# Patient Record
Sex: FEMALE | Race: BLACK OR AFRICAN AMERICAN | NOT HISPANIC OR LATINO | Employment: UNEMPLOYED
[De-identification: names, ages, dates, MRNs, and addresses within clinical notes are randomized per-mention and may not be internally consistent; named-entity substitution may affect disease eponyms.]

---

## 2017-12-17 ENCOUNTER — HEALTH MAINTENANCE LETTER (OUTPATIENT)
Age: 62
End: 2017-12-17

## 2024-07-16 ENCOUNTER — HOSPITAL ENCOUNTER (INPATIENT)
Facility: CLINIC | Age: 69
LOS: 8 days | Discharge: NURSING, REHAB OR LONG-TERM CARE | DRG: 948 | End: 2024-07-24
Attending: EMERGENCY MEDICINE | Admitting: INTERNAL MEDICINE
Payer: COMMERCIAL

## 2024-07-16 ENCOUNTER — APPOINTMENT (OUTPATIENT)
Dept: GENERAL RADIOLOGY | Facility: CLINIC | Age: 69
DRG: 948 | End: 2024-07-16
Attending: INTERNAL MEDICINE
Payer: COMMERCIAL

## 2024-07-16 ENCOUNTER — APPOINTMENT (OUTPATIENT)
Dept: CT IMAGING | Facility: CLINIC | Age: 69
DRG: 948 | End: 2024-07-16
Attending: INTERNAL MEDICINE
Payer: COMMERCIAL

## 2024-07-16 DIAGNOSIS — R13.12 DYSPHAGIA, OROPHARYNGEAL PHASE: ICD-10-CM

## 2024-07-16 DIAGNOSIS — I13.0 HYPERTENSIVE HEART AND RENAL DISEASE WITH CONGESTIVE HEART FAILURE (H): ICD-10-CM

## 2024-07-16 DIAGNOSIS — R35.0 URINARY FREQUENCY: ICD-10-CM

## 2024-07-16 DIAGNOSIS — Z78.9 UNABLE TO CARE FOR SELF: ICD-10-CM

## 2024-07-16 DIAGNOSIS — I69.322 CVA, OLD, DYSARTHRIA: ICD-10-CM

## 2024-07-16 DIAGNOSIS — E11.22 TYPE 2 DIABETES MELLITUS WITH DIABETIC CHRONIC KIDNEY DISEASE, UNSPECIFIED CKD STAGE, UNSPECIFIED WHETHER LONG TERM INSULIN USE (H): Primary | ICD-10-CM

## 2024-07-16 DIAGNOSIS — I69.359 HEMIPLEGIA, DOMINANT SIDE S/P CVA (CEREBROVASCULAR ACCIDENT) (H): ICD-10-CM

## 2024-07-16 DIAGNOSIS — N18.30 STAGE 3 CHRONIC KIDNEY DISEASE, UNSPECIFIED WHETHER STAGE 3A OR 3B CKD (H): ICD-10-CM

## 2024-07-16 DIAGNOSIS — I69.391 DYSPHAGIA STATUS POST CEREBROVASCULAR ACCIDENT: ICD-10-CM

## 2024-07-16 LAB
ALBUMIN SERPL BCG-MCNC: 3.5 G/DL (ref 3.5–5.2)
ALBUMIN UR-MCNC: 100 MG/DL
ALP SERPL-CCNC: 105 U/L (ref 40–150)
ALT SERPL W P-5'-P-CCNC: 14 U/L (ref 0–50)
ANION GAP SERPL CALCULATED.3IONS-SCNC: 10 MMOL/L (ref 7–15)
APPEARANCE UR: CLEAR
AST SERPL W P-5'-P-CCNC: 27 U/L (ref 0–45)
BASOPHILS # BLD AUTO: 0 10E3/UL (ref 0–0.2)
BASOPHILS NFR BLD AUTO: 0 %
BILIRUB SERPL-MCNC: 0.4 MG/DL
BILIRUB UR QL STRIP: NEGATIVE
BUN SERPL-MCNC: 28.9 MG/DL (ref 8–23)
CALCIUM SERPL-MCNC: 9.4 MG/DL (ref 8.8–10.4)
CHLORIDE SERPL-SCNC: 106 MMOL/L (ref 98–107)
COLOR UR AUTO: ABNORMAL
CREAT SERPL-MCNC: 1.5 MG/DL (ref 0.51–0.95)
CRP SERPL-MCNC: 30.5 MG/L
DIGOXIN SERPL-MCNC: 1 NG/ML (ref 0.6–2)
EGFRCR SERPLBLD CKD-EPI 2021: 38 ML/MIN/1.73M2
EOSINOPHIL # BLD AUTO: 0.1 10E3/UL (ref 0–0.7)
EOSINOPHIL NFR BLD AUTO: 1 %
ERYTHROCYTE [DISTWIDTH] IN BLOOD BY AUTOMATED COUNT: 16 % (ref 10–15)
GLUCOSE BLDC GLUCOMTR-MCNC: 94 MG/DL (ref 70–99)
GLUCOSE SERPL-MCNC: 114 MG/DL (ref 70–99)
GLUCOSE UR STRIP-MCNC: >=1000 MG/DL
HCO3 SERPL-SCNC: 25 MMOL/L (ref 22–29)
HCT VFR BLD AUTO: 36.5 % (ref 35–47)
HGB BLD-MCNC: 11 G/DL (ref 11.7–15.7)
HGB UR QL STRIP: NEGATIVE
IMM GRANULOCYTES # BLD: 0.1 10E3/UL
IMM GRANULOCYTES NFR BLD: 1 %
KETONES UR STRIP-MCNC: NEGATIVE MG/DL
LEUKOCYTE ESTERASE UR QL STRIP: ABNORMAL
LYMPHOCYTES # BLD AUTO: 2.1 10E3/UL (ref 0.8–5.3)
LYMPHOCYTES NFR BLD AUTO: 21 %
MAGNESIUM SERPL-MCNC: 2.3 MG/DL (ref 1.7–2.3)
MCH RBC QN AUTO: 26.6 PG (ref 26.5–33)
MCHC RBC AUTO-ENTMCNC: 30.1 G/DL (ref 31.5–36.5)
MCV RBC AUTO: 88 FL (ref 78–100)
MONOCYTES # BLD AUTO: 1 10E3/UL (ref 0–1.3)
MONOCYTES NFR BLD AUTO: 10 %
NEUTROPHILS # BLD AUTO: 6.7 10E3/UL (ref 1.6–8.3)
NEUTROPHILS NFR BLD AUTO: 67 %
NITRATE UR QL: NEGATIVE
NRBC # BLD AUTO: 0 10E3/UL
NRBC BLD AUTO-RTO: 0 /100
NT-PROBNP SERPL-MCNC: 1918 PG/ML (ref 0–900)
PH UR STRIP: 6.5 [PH] (ref 5–7)
PHOSPHATE SERPL-MCNC: 4.2 MG/DL (ref 2.5–4.5)
PLATELET # BLD AUTO: 414 10E3/UL (ref 150–450)
POTASSIUM SERPL-SCNC: 5.1 MMOL/L (ref 3.4–5.3)
PROCALCITONIN SERPL IA-MCNC: 0.18 NG/ML
PROT SERPL-MCNC: 8.3 G/DL (ref 6.4–8.3)
RBC # BLD AUTO: 4.14 10E6/UL (ref 3.8–5.2)
RBC URINE: 0 /HPF
SODIUM SERPL-SCNC: 141 MMOL/L (ref 135–145)
SP GR UR STRIP: 1.02 (ref 1–1.03)
SQUAMOUS EPITHELIAL: 3 /HPF
TRANSITIONAL EPI: <1 /HPF
UROBILINOGEN UR STRIP-MCNC: NORMAL MG/DL
WBC # BLD AUTO: 10 10E3/UL (ref 4–11)
WBC URINE: 4 /HPF

## 2024-07-16 PROCEDURE — 80162 ASSAY OF DIGOXIN TOTAL: CPT | Performed by: INTERNAL MEDICINE

## 2024-07-16 PROCEDURE — 83735 ASSAY OF MAGNESIUM: CPT | Performed by: INTERNAL MEDICINE

## 2024-07-16 PROCEDURE — 86140 C-REACTIVE PROTEIN: CPT | Performed by: INTERNAL MEDICINE

## 2024-07-16 PROCEDURE — 120N000002 HC R&B MED SURG/OB UMMC

## 2024-07-16 PROCEDURE — 84145 PROCALCITONIN (PCT): CPT | Performed by: INTERNAL MEDICINE

## 2024-07-16 PROCEDURE — 84100 ASSAY OF PHOSPHORUS: CPT | Performed by: INTERNAL MEDICINE

## 2024-07-16 PROCEDURE — 82962 GLUCOSE BLOOD TEST: CPT

## 2024-07-16 PROCEDURE — 82040 ASSAY OF SERUM ALBUMIN: CPT | Performed by: EMERGENCY MEDICINE

## 2024-07-16 PROCEDURE — 83036 HEMOGLOBIN GLYCOSYLATED A1C: CPT | Performed by: INTERNAL MEDICINE

## 2024-07-16 PROCEDURE — 71045 X-RAY EXAM CHEST 1 VIEW: CPT

## 2024-07-16 PROCEDURE — 84155 ASSAY OF PROTEIN SERUM: CPT | Performed by: EMERGENCY MEDICINE

## 2024-07-16 PROCEDURE — 87086 URINE CULTURE/COLONY COUNT: CPT | Performed by: INTERNAL MEDICINE

## 2024-07-16 PROCEDURE — 70450 CT HEAD/BRAIN W/O DYE: CPT

## 2024-07-16 PROCEDURE — 99222 1ST HOSP IP/OBS MODERATE 55: CPT | Performed by: INTERNAL MEDICINE

## 2024-07-16 PROCEDURE — 36415 COLL VENOUS BLD VENIPUNCTURE: CPT | Performed by: EMERGENCY MEDICINE

## 2024-07-16 PROCEDURE — 99285 EMERGENCY DEPT VISIT HI MDM: CPT | Mod: 25 | Performed by: EMERGENCY MEDICINE

## 2024-07-16 PROCEDURE — 70450 CT HEAD/BRAIN W/O DYE: CPT | Mod: 26 | Performed by: RADIOLOGY

## 2024-07-16 PROCEDURE — 258N000003 HC RX IP 258 OP 636: Performed by: INTERNAL MEDICINE

## 2024-07-16 PROCEDURE — 71045 X-RAY EXAM CHEST 1 VIEW: CPT | Mod: 26 | Performed by: STUDENT IN AN ORGANIZED HEALTH CARE EDUCATION/TRAINING PROGRAM

## 2024-07-16 PROCEDURE — 81001 URINALYSIS AUTO W/SCOPE: CPT | Performed by: EMERGENCY MEDICINE

## 2024-07-16 PROCEDURE — 99285 EMERGENCY DEPT VISIT HI MDM: CPT | Performed by: EMERGENCY MEDICINE

## 2024-07-16 PROCEDURE — 85025 COMPLETE CBC W/AUTO DIFF WBC: CPT | Performed by: EMERGENCY MEDICINE

## 2024-07-16 PROCEDURE — 83880 ASSAY OF NATRIURETIC PEPTIDE: CPT | Performed by: INTERNAL MEDICINE

## 2024-07-16 RX ORDER — ACETAMINOPHEN 325 MG/1
650 TABLET ORAL EVERY 4 HOURS PRN
Status: DISCONTINUED | OUTPATIENT
Start: 2024-07-16 | End: 2024-07-24 | Stop reason: HOSPADM

## 2024-07-16 RX ORDER — ACETAMINOPHEN 650 MG/1
650 SUPPOSITORY RECTAL EVERY 4 HOURS PRN
Status: DISCONTINUED | OUTPATIENT
Start: 2024-07-16 | End: 2024-07-24 | Stop reason: HOSPADM

## 2024-07-16 RX ORDER — LIDOCAINE 40 MG/G
CREAM TOPICAL
Status: DISCONTINUED | OUTPATIENT
Start: 2024-07-16 | End: 2024-07-24 | Stop reason: HOSPADM

## 2024-07-16 RX ORDER — PROCHLORPERAZINE MALEATE 5 MG
5 TABLET ORAL EVERY 6 HOURS PRN
Status: DISCONTINUED | OUTPATIENT
Start: 2024-07-16 | End: 2024-07-24 | Stop reason: HOSPADM

## 2024-07-16 RX ORDER — AMOXICILLIN 250 MG
2 CAPSULE ORAL 2 TIMES DAILY PRN
Status: DISCONTINUED | OUTPATIENT
Start: 2024-07-16 | End: 2024-07-24 | Stop reason: HOSPADM

## 2024-07-16 RX ORDER — NICOTINE POLACRILEX 4 MG
15-30 LOZENGE BUCCAL
Status: DISCONTINUED | OUTPATIENT
Start: 2024-07-16 | End: 2024-07-24 | Stop reason: HOSPADM

## 2024-07-16 RX ORDER — AMOXICILLIN 250 MG
1 CAPSULE ORAL 2 TIMES DAILY PRN
Status: DISCONTINUED | OUTPATIENT
Start: 2024-07-16 | End: 2024-07-24 | Stop reason: HOSPADM

## 2024-07-16 RX ORDER — IPRATROPIUM BROMIDE AND ALBUTEROL SULFATE 2.5; .5 MG/3ML; MG/3ML
3 SOLUTION RESPIRATORY (INHALATION) EVERY 4 HOURS PRN
Status: DISCONTINUED | OUTPATIENT
Start: 2024-07-16 | End: 2024-07-24 | Stop reason: HOSPADM

## 2024-07-16 RX ORDER — CALCIUM CARBONATE 500 MG/1
1000 TABLET, CHEWABLE ORAL 4 TIMES DAILY PRN
Status: DISCONTINUED | OUTPATIENT
Start: 2024-07-16 | End: 2024-07-24 | Stop reason: HOSPADM

## 2024-07-16 RX ORDER — DEXTROSE MONOHYDRATE 25 G/50ML
25-50 INJECTION, SOLUTION INTRAVENOUS
Status: DISCONTINUED | OUTPATIENT
Start: 2024-07-16 | End: 2024-07-24 | Stop reason: HOSPADM

## 2024-07-16 RX ORDER — PROCHLORPERAZINE 25 MG
12.5 SUPPOSITORY, RECTAL RECTAL EVERY 12 HOURS PRN
Status: DISCONTINUED | OUTPATIENT
Start: 2024-07-16 | End: 2024-07-24 | Stop reason: HOSPADM

## 2024-07-16 RX ADMIN — SODIUM CHLORIDE, POTASSIUM CHLORIDE, SODIUM LACTATE AND CALCIUM CHLORIDE 500 ML: 600; 310; 30; 20 INJECTION, SOLUTION INTRAVENOUS at 23:58

## 2024-07-16 ASSESSMENT — ACTIVITIES OF DAILY LIVING (ADL)
ADLS_ACUITY_SCORE: 43
ADLS_ACUITY_SCORE: 35
ADLS_ACUITY_SCORE: 35
ADLS_ACUITY_SCORE: 43

## 2024-07-16 NOTE — PROGRESS NOTES
Pt has been incontinent x2. Provider aware. Orders to straughgt cath to obtain   UA. Pt in aggreance

## 2024-07-16 NOTE — LETTER
Recipient: [facility]          Sender: David Ng [phone]          Date: July 19, 2024  Patient Name:  Roya Burgos  Patient YOB: 1955  Routing Message:  Patient is needing LTC placement. She is med ready. Please call with any questions and if able to accept the patient. Thank you! David Ng [phone]        The documents accompanying this notice contain confidential information belonging to the sender.  This information is intended only for the use of the individual or entity named above.  The authorized recipient of this information is prohibited from disclosing this information to any other party and is required to destroy the information after its stated need has been fulfilled, unless otherwise required by state law.    If you are not the intended recipient, you are hereby notified that any disclosure, copy, distribution or action taken in reliance on the contents of these documents is strictly prohibited.  If you have received this document in error, please return it by fax to [phone] with a note on the cover sheet explaining why you are returning it (e.g. not your patient, not your provider, etc.).  If you need further assistance, please call .  Documents may also be returned by mail to [facility] [facility], , [address], [place], [place] [address].

## 2024-07-16 NOTE — LETTER
Recipient: [facility]          Sender: David Ng [phone]          Date: July 23, 2024  Patient Name:  Roya Burgos  Patient YOB: 1955  Routing Message:  Patient is needing LTC placement. She is med ready. Please call with any questions and if able to accept the patient. Thank you!!        The documents accompanying this notice contain confidential information belonging to the sender.  This information is intended only for the use of the individual or entity named above.  The authorized recipient of this information is prohibited from disclosing this information to any other party and is required to destroy the information after its stated need has been fulfilled, unless otherwise required by state law.    If you are not the intended recipient, you are hereby notified that any disclosure, copy, distribution or action taken in reliance on the contents of these documents is strictly prohibited.  If you have received this document in error, please return it by fax to [phone] with a note on the cover sheet explaining why you are returning it (e.g. not your patient, not your provider, etc.).  If you need further assistance, please call .  Documents may also be returned by mail to [facility] [employer] [facility], , [address], [place], [place] [address].

## 2024-07-16 NOTE — ED NOTES
Bed: UUEDH-E  Expected date:   Expected time:   Means of arrival:   Comments:  [facility] [phone] [id number] bed bound, ETA 15, needs higher level of care poss UTI

## 2024-07-16 NOTE — ED PROVIDER NOTES
"  History     Chief Complaint   Patient presents with    Urinary Frequency     The history is provided by the patient and medical records.     Roya Burgos is a 69 year old female with a past medical history of type 2 diabetes, hypertension, recent ischemic left MCA CVA complicated by cerebral edema status post decompressive hemicraniectomy complicated by hemorrhagic transformation secondary to therapeutic anticoagulation with residual dysarthria, oropharyngeal dysphagia s/p PEG tube on TF's and hemiparesis (5/2024), CKD stage 3, cardiomyopathy, recently diagnosed heart failure, atrial fibrillation with RVR (on diltiazem, lopressor, digoxin), and COPD who presents to the ED via ambulance from home for evaluation of subjective fevers, urinary frequency and concerns for UTI. She had a recent stroke in March 2024 with right side deficit. Patient have been in and out of the hospital due to stroke and was finally discharged home two days ago. Family noticed that patient has been warm to the touch, urinary frequency which patient and family are suspecting a UTI. Patient is bed bound. Patient, home health staff and family feel that patient needs to be moved to a facility with high level of care.      Epic records reviewed.     Patient was discharged to [facility] 6/24/24 after her hospitalization after initial left MCA CVA.     Hospitalized from  6/30/2024 to 7/1/2024 for evaluation after a headache.   ED note 6/30/2024:  \"I had a long discussion with the patient's daughter, who took the patient home from the hospital 2 days ago and is having a hard time caring for her at home. Staff in the hospital had recommended TCU, but they were unable to find her a bed other than at a place where patient has been previously and daughter was unhappy with the care she received there. Daughter thought she would be able to take care of her at home, so has been caring for her. The cares the patient is requiring are far more than she can " "handle and she is unable to get the patient into the tub to bathe her and does not have the resources needed to care for her 24 hours today. Daughter would like patient to be readmitted for placement to a TCU\"     Discharge summary: \"Daughter Miles asked if pt can go to TCU/ARU as she wanted her to get aggressive therapy I hope pt's right sided deficit would improve. She didn't think aggressive therapy can be done at home... PT/OT worked with patient and they don't think she qualifies for ARU/TCU and is more appropriate for LTC. After this discussion, pt's daughter elected to take her home and try again with home care.\"     Hospitalized from 7/6/2024 to 7/14/2024 with Headache, need for long term care placement     Hospitalist note 7/6/24  \"Disposition Planning: has been living with patients daughter. Daughter reports it has been challenging to care for her. She spoke with a long term care facility on Friday whom reportedly may have a bed available on Monday. Patient's daughter reports it is unsafe for her to discharge to patient's own house with patients grandson/daughters son caring for her until then.\"    Discharge summary 7/14/2024: \"FTT at home  Initial reason for admission. Family not able able to care for patient at home. However, after further consideration, daughter wanted to get patient set up with more resources at home and give it one more try. Able to set up with supplies, hospital bed, and home cares\"     I have reviewed the Medications, Allergies, Past Medical and Surgical History, and Social History in the [employer] system.    Past Medical History:   Diagnosis Date    Diabetes mellitus, type II, insulin dependent (H)     HTN (hypertension)     Major depression      Past Surgical History:   Procedure Laterality Date    ANKLE SURGERY      IR LOWER EXTREMITY ANGIOGRAM LEFT  4/6/2016    IR LOWER EXTREMITY ANGIOGRAM LEFT  2/3/2016     No current facility-administered medications for this encounter. "     Current Outpatient Medications   Medication Sig Dispense Refill    ASPIRIN PO Take 81 mg by mouth daily      Dextromethorphan-guaiFENesin  MG/5ML LIQD Take 15 mLs by mouth 4 times daily as needed 180 mL 1    DM-Phenylephrine-acetaminophen 10-5-325 MG CAPS       insulin aspart (NOVOLOG FLEXPEN) 100 UNIT/ML soln Inject 10 Units Subcutaneous 3 times daily (with meals)      insulin glargine (LANTUS VIAL) 100 UNIT/ML soln Inject 20 Units Subcutaneous At Bedtime      lisinopril-hydrochlorothiazide (PRINZIDE,ZESTORETIC) 20-12.5 MG per tablet Take 1 tablet by mouth every morning      METFORMIN HCL PO Take 1,000 mg by mouth 2 times daily      oxyCODONE-acetaminophen (PERCOCET) 5-325 MG per tablet Take 1 tablet by mouth 2 times daily as needed       No Known Allergies  Past medical history, past surgical history, medications, and allergies were reviewed with the patient. Additional pertinent items: None    Social History     Socioeconomic History    Marital status: Single     Spouse name: Not on file    Number of children: Not on file    Years of education: Not on file    Highest education level: Not on file   Occupational History    Not on file   Tobacco Use    Smoking status: Not on file    Smokeless tobacco: Not on file   Substance and Sexual Activity    Alcohol use: No    Drug use: No    Sexual activity: Not on file   Other Topics Concern    Not on file   Social History Narrative    On disability, due to depression. Former nursing assistant in hospitals and NH. Has two daughters, one lives in [place] and one lives in [place].     Social Determinants of Health     Financial Resource Strain: Low Risk  (1/25/2024)    Received from [facility]    Financial Resource Strain     Difficulty of Paying Living Expenses: 3     Difficulty of Paying Living Expenses: Not on file   Food Insecurity: No Food Insecurity (7/6/2024)    Received from [facility]    Hunger Vital Sign      "Worried About Running Out of Food in the Last Year: Never true     Ran Out of Food in the Last Year: Never true   Transportation Needs: No Transportation Needs (7/6/2024)    Received from [facility]    PRAPARE - Transportation     Lack of Transportation (Medical): No     Lack of Transportation (Non-Medical): No   Physical Activity: Not on File (6/27/2022)    Received from [facility]    Physical Activity     Physical Activity: 0   Stress: Not on File (6/27/2022)    Received from [employer]    Stress     Stress: 0   Social Connections: Socially Integrated (1/25/2024)    Received from [facility]    Social Connections     Frequency of Communication with Friends and Family: 0   Interpersonal Safety: Unknown (7/6/2024)    Received from [facility]    Humiliation, Afraid, Rape, and Kick questionnaire     Fear of Current or Ex-Partner: Not on file     Emotionally Abused: No     Physically Abused: No     Sexually Abused: No   Housing Stability: Low Risk  (7/6/2024)    Received from [facility]    Housing Stability Vital Sign     Unable to Pay for Housing in the Last Year: No     Number of Places Lived in the Last Year: 1     In the last 12 months, was there a time when you did not have a steady place to sleep or slept in a shelter (including now)?: No     Social history was reviewed with the patient. Additional pertinent items: None    Review of Systems  A medically appropriate review of systems was performed with pertinent positives and negatives noted in the HPI, and all other systems negative.    Physical Exam   BP: 137/87  Pulse: 93  Temp: 98.9  F (37.2  C)  Resp: 13  Height: 165.1 cm (5' 5\")  Weight: 107.5 kg (237 lb)  SpO2: 97 %      General: Well nourished, well developed, NAD  HEENT: EOMI, anicteric. NCAT, MMM  Neck: no jugular venous distension, supple, nl ROM  Cardiac: Regular rate and rhythm. No murmurs, rubs, or gallops. Normal S1, S2.  Intact peripheral pulses  Pulm: CTAB, no " stridor, wheezes, rales, rhonchi  Abd: Soft, nontender, nondistended.  No masses palpated.    Skin: Warm and dry to the touch.  No rash  Extremities: No LE edema, no cyanosis, w/w/p  Neuro: Alert, no new gross focal deficits    ED Course        Procedures                        Labs Ordered and Resulted from Time of ED Arrival to Time of ED Departure   COMPREHENSIVE METABOLIC PANEL - Abnormal       Result Value    Sodium 141      Potassium 5.1      Carbon Dioxide (CO2) 25      Anion Gap 10      Urea Nitrogen 28.9 (*)     Creatinine 1.50 (*)     GFR Estimate 38 (*)     Calcium 9.4      Chloride 106      Glucose 114 (*)     Alkaline Phosphatase 105      AST 27      ALT 14      Protein Total 8.3      Albumin 3.5      Bilirubin Total 0.4     ROUTINE UA WITH MICROSCOPIC REFLEX TO CULTURE - Abnormal    Color Urine Light Yellow      Appearance Urine Clear      Glucose Urine >=1000 (*)     Bilirubin Urine Negative      Ketones Urine Negative      Specific Gravity Urine 1.022      Blood Urine Negative      pH Urine 6.5      Protein Albumin Urine 100 (*)     Urobilinogen Urine Normal      Nitrite Urine Negative      Leukocyte Esterase Urine Trace (*)     RBC Urine 0      WBC Urine 4      Squamous Epithelials Urine 3 (*)     Transitional Epithelials Urine <1     CBC WITH PLATELETS AND DIFFERENTIAL - Abnormal    WBC Count 10.0      RBC Count 4.14      Hemoglobin 11.0 (*)     Hematocrit 36.5      MCV 88      MCH 26.6      MCHC 30.1 (*)     RDW 16.0 (*)     Platelet Count 414      % Neutrophils 67      % Lymphocytes 21      % Monocytes 10      % Eosinophils 1      % Basophils 0      % Immature Granulocytes 1      NRBCs per 100 WBC 0      Absolute Neutrophils 6.7      Absolute Lymphocytes 2.1      Absolute Monocytes 1.0      Absolute Eosinophils 0.1      Absolute Basophils 0.0      Absolute Immature Granulocytes 0.1      Absolute NRBCs 0.0     CRP INFLAMMATION - Abnormal    CRP Inflammation 30.50 (*)    NT PROBNP INPATIENT -  Abnormal    N terminal Pro BNP Inpatient 1,918 (*)    HEMOGLOBIN A1C - Abnormal    Hemoglobin A1C 7.4 (*)    GLUCOSE BY METER - Abnormal    GLUCOSE BY METER POCT 131 (*)    GLUCOSE BY METER - Abnormal    GLUCOSE BY METER POCT 122 (*)    PROCALCITONIN - Normal    Procalcitonin 0.18     MAGNESIUM - Normal    Magnesium 2.3     PHOSPHORUS - Normal    Phosphorus 4.2     DIGOXIN LEVEL - Normal    Digoxin 1.0     GLUCOSE BY METER - Normal    GLUCOSE BY METER POCT 94     AMMONIA   LACTIC ACID WHOLE BLOOD   CRP INFLAMMATION   PROCALCITONIN   BLOOD GAS VENOUS   COVID-19 VIRUS (CORONAVIRUS) BY PCR   GLUCOSE MONITOR NURSING POCT   GLUCOSE MONITOR NURSING POCT   GLUCOSE MONITOR NURSING POCT   GLUCOSE MONITOR NURSING POCT   GLUCOSE MONITOR NURSING POCT   COMPREHENSIVE METABOLIC PANEL   GLUCOSE MONITOR NURSING POCT   URINE CULTURE    Culture No growth, less than 1 day     C. DIFFICILE TOXIN B PCR WITH REFLEX TO C. DIFFICILE ANTIGEN AND TOXINS A/B EIA   ENTERIC BACTERIA AND VIRUS PANEL BY PCR   BLOOD CULTURE   BLOOD CULTURE   FRACTIONAL EXCRETION OF SODIUM            Results for orders placed or performed during the hospital encounter of 07/16/24 (from the past 24 hour(s))   CBC with platelets differential    Narrative    The following orders were created for panel order CBC with platelets differential.  Procedure                               Abnormality         Status                     ---------                               -----------         ------                     CBC with platelets and d...[[id number]]  Abnormal            Final result                 Please view results for these tests on the individual orders.   Comprehensive metabolic panel   Result Value Ref Range    Sodium 141 135 - 145 mmol/L    Potassium 5.1 3.4 - 5.3 mmol/L    Carbon Dioxide (CO2) 25 22 - 29 mmol/L    Anion Gap 10 7 - 15 mmol/L    Urea Nitrogen 28.9 (H) 8.0 - 23.0 mg/dL    Creatinine 1.50 (H) 0.51 - 0.95 mg/dL    GFR Estimate 38 (L) >60  mL/min/1.73m2    Calcium 9.4 8.8 - 10.4 mg/dL    Chloride 106 98 - 107 mmol/L    Glucose 114 (H) 70 - 99 mg/dL    Alkaline Phosphatase 105 40 - 150 U/L    AST 27 0 - 45 U/L    ALT 14 0 - 50 U/L    Protein Total 8.3 6.4 - 8.3 g/dL    Albumin 3.5 3.5 - 5.2 g/dL    Bilirubin Total 0.4 <=1.2 mg/dL   CBC with platelets and differential   Result Value Ref Range    WBC Count 10.0 4.0 - 11.0 10e3/uL    RBC Count 4.14 3.80 - 5.20 10e6/uL    Hemoglobin 11.0 (L) 11.7 - 15.7 g/dL    Hematocrit 36.5 35.0 - 47.0 %    MCV 88 78 - 100 fL    MCH 26.6 26.5 - 33.0 pg    MCHC 30.1 (L) 31.5 - 36.5 g/dL    RDW 16.0 (H) 10.0 - 15.0 %    Platelet Count 414 150 - 450 10e3/uL    % Neutrophils 67 %    % Lymphocytes 21 %    % Monocytes 10 %    % Eosinophils 1 %    % Basophils 0 %    % Immature Granulocytes 1 %    NRBCs per 100 WBC 0 <1 /100    Absolute Neutrophils 6.7 1.6 - 8.3 10e3/uL    Absolute Lymphocytes 2.1 0.8 - 5.3 10e3/uL    Absolute Monocytes 1.0 0.0 - 1.3 10e3/uL    Absolute Eosinophils 0.1 0.0 - 0.7 10e3/uL    Absolute Basophils 0.0 0.0 - 0.2 10e3/uL    Absolute Immature Granulocytes 0.1 <=0.4 10e3/uL    Absolute NRBCs 0.0 10e3/uL   CRP inflammation   Result Value Ref Range    CRP Inflammation 30.50 (H) <5.00 mg/L   Procalcitonin   Result Value Ref Range    Procalcitonin 0.18 <0.50 ng/mL   Magnesium   Result Value Ref Range    Magnesium 2.3 1.7 - 2.3 mg/dL   Phosphorus   Result Value Ref Range    Phosphorus 4.2 2.5 - 4.5 mg/dL   Nt probnp inpatient   Result Value Ref Range    N terminal Pro BNP Inpatient 1,918 (H) 0 - 900 pg/mL   Digoxin level   Result Value Ref Range    Digoxin 1.0 0.6 - 2.0 ng/mL   Hemoglobin A1c   Result Value Ref Range    Hemoglobin A1C 7.4 (H) <5.7 %   UA with Microscopic reflex to Culture    Specimen: Urine, Catheter   Result Value Ref Range    Color Urine Light Yellow Colorless, Straw, Light Yellow, Yellow    Appearance Urine Clear Clear    Glucose Urine >=1000 (A) Negative mg/dL    Bilirubin Urine Negative  Negative    Ketones Urine Negative Negative mg/dL    Specific Gravity Urine 1.022 1.003 - 1.035    Blood Urine Negative Negative    pH Urine 6.5 5.0 - 7.0    Protein Albumin Urine 100 (A) Negative mg/dL    Urobilinogen Urine Normal Normal, 2.0 mg/dL    Nitrite Urine Negative Negative    Leukocyte Esterase Urine Trace (A) Negative    RBC Urine 0 <=2 /HPF    WBC Urine 4 <=5 /HPF    Squamous Epithelials Urine 3 (H) <=1 /HPF    Transitional Epithelials Urine <1 <=1 /HPF    Narrative    Urine Culture not indicated   Urine Culture    Specimen: Urine, Catheter   Result Value Ref Range    Culture No growth, less than 1 day    XR Chest Port 1 View    Narrative    EXAM: XR CHEST PORT 1 VIEW  LOCATION: [facility]  DATE: 7/16/2024    INDICATION: subjective fever, cough, post stroke, high aspiration risk  COMPARISON: Chest radiograph 6/8/2024      Impression    IMPRESSION: Low lung volumes with bronchovascular crowding. No focal consolidation, pleural effusion or pneumothorax. Cardiomediastinal silhouette is unremarkable.   CT Head w/o Contrast    Narrative    EXAM: CT HEAD W/O CONTRAST  LOCATION: [facility]  DATE: 7/16/2024    INDICATION: Change of mental status, recent left MCA s/p carniotomy with bleeding.  COMPARISON: 7/13/2024.  TECHNIQUE: Routine CT Head without IV contrast. Multiplanar reformats. Dose reduction techniques were used.    FINDINGS:  INTRACRANIAL CONTENTS: Ischemic changes in the left cerebral hemisphere with overlying craniectomy. Small chronic lacunar infarct in the left cerebellar hemisphere again noted. No CT evidence of acute infarct. Normal parenchymal attenuation. Normal   ventricles and sulci.     VISUALIZED ORBITS/SINUSES/MASTOIDS: No intraorbital abnormality. No paranasal sinus mucosal disease. No middle ear or mastoid effusion.    BONES/SOFT TISSUES: No acute abnormality. Indeterminate left parotid nodule  again noted.      Impression    IMPRESSION:  1.  No acute intracranial abnormality or significant change compared to the prior study.   Glucose by meter   Result Value Ref Range    GLUCOSE BY METER POCT 94 70 - 99 mg/dL   Glucose by meter   Result Value Ref Range    GLUCOSE BY METER POCT 131 (H) 70 - 99 mg/dL   Glucose by meter   Result Value Ref Range    GLUCOSE BY METER POCT 122 (H) 70 - 99 mg/dL       Labs, vital signs, and imaging studies were reviewed by me.    Medications - No data to display    Assessments & Plan (with Medical Decision Making)   Roya Burgos is a 69 year old female who presents with weakness, unable to care for herself at home, and urinary symptoms. Ddx includes UTI, electrolyte or other metabolic abnormality, sequelae from recent known CVA. Labs ordered to further evaluate the pt.     Labs remarkable for normal WBC count. UA not c/w UTI.     Critical care was not performed.     Medical Decision Making  The patient's presentation was of high complexity (a chronic illness severe exacerbation, progression, or side effect of treatment).    The patient's evaluation involved:  ordering and/or review of 3+ test(s) in this encounter (see separate area of note for details)  discussion of management or test interpretation with another health professional (see separate area of note for details)    The patient's management necessitated high risk (a decision regarding hospitalization).    I have reviewed the nursing notes.    I have reviewed the findings, diagnosis, plan and need for follow up with the patient.    Patient and their further management were discussed with IM, to be admitted to their service. Plan was discussed with patient who understands and agrees with plan.       New Prescriptions    No medications on file       Final diagnoses:   Urinary frequency   Unable to care for self       LOPEZ LOMBARDI MD  7/16/2024   [facility] EMERGENCY DEPARTMENT             Lopez Lombardi  MD YUE  07/17/24 [id number]

## 2024-07-16 NOTE — ED TRIAGE NOTES
Pt BIBA from home.Pt had a recent stroke in march 2024 with right side deficit.Pt have been in and out of the hospital due to stroke and was finally discharged home two days ago.Pt, home health staff and family agrees that pt needs to be in  a facility with high level of care.Pt also is  warm to touch, urinary frequency which pt and family are suspecting a UTI.Pt is bed bound.BG-130, Vitals WNL, slightly tachy-110  HX: Stroke, craniotomy

## 2024-07-17 ENCOUNTER — APPOINTMENT (OUTPATIENT)
Dept: SPEECH THERAPY | Facility: CLINIC | Age: 69
DRG: 948 | End: 2024-07-17
Attending: INTERNAL MEDICINE
Payer: COMMERCIAL

## 2024-07-17 LAB
ADV 40+41 DNA STL QL NAA+NON-PROBE: NEGATIVE
ASTRO TYP 1-8 RNA STL QL NAA+NON-PROBE: NEGATIVE
ATRIAL RATE - MUSE: 132 BPM
BASE EXCESS BLDV CALC-SCNC: 1.5 MMOL/L (ref -3–3)
C CAYETANENSIS DNA STL QL NAA+NON-PROBE: NEGATIVE
C DIFF TOX B STL QL: NEGATIVE
CAMPYLOBACTER DNA SPEC NAA+PROBE: NEGATIVE
CRYPTOSP DNA STL QL NAA+NON-PROBE: NEGATIVE
DIASTOLIC BLOOD PRESSURE - MUSE: NORMAL MMHG
E COLI O157 DNA STL QL NAA+NON-PROBE: NORMAL
E HISTOLYT DNA STL QL NAA+NON-PROBE: NEGATIVE
EAEC ASTA GENE ISLT QL NAA+PROBE: NEGATIVE
EC STX1+STX2 GENES STL QL NAA+NON-PROBE: NEGATIVE
EPEC EAE GENE STL QL NAA+NON-PROBE: NEGATIVE
ETEC LTA+ST1A+ST1B TOX ST NAA+NON-PROBE: NEGATIVE
G LAMBLIA DNA STL QL NAA+NON-PROBE: NEGATIVE
GLUCOSE BLDC GLUCOMTR-MCNC: 122 MG/DL (ref 70–99)
GLUCOSE BLDC GLUCOMTR-MCNC: 131 MG/DL (ref 70–99)
GLUCOSE BLDC GLUCOMTR-MCNC: 180 MG/DL (ref 70–99)
GLUCOSE BLDC GLUCOMTR-MCNC: 241 MG/DL (ref 70–99)
HBA1C MFR BLD: 7.4 %
HCO3 BLDV-SCNC: 26 MMOL/L (ref 21–28)
INTERPRETATION ECG - MUSE: NORMAL
LACTATE SERPL-SCNC: 2.1 MMOL/L (ref 0.7–2)
NOROVIRUS GI+II RNA STL QL NAA+NON-PROBE: NEGATIVE
O2/TOTAL GAS SETTING VFR VENT: 92 %
OXYHGB MFR BLDV: 73 % (ref 70–75)
P AXIS - MUSE: NORMAL DEGREES
P SHIGELLOIDES DNA STL QL NAA+NON-PROBE: NEGATIVE
PCO2 BLDV: 40 MM HG (ref 40–50)
PH BLDV: 7.42 [PH] (ref 7.32–7.43)
PO2 BLDV: 40 MM HG (ref 25–47)
PR INTERVAL - MUSE: NORMAL MS
QRS DURATION - MUSE: 120 MS
QT - MUSE: 340 MS
QTC - MUSE: 503 MS
R AXIS - MUSE: -78 DEGREES
RVA RNA STL QL NAA+NON-PROBE: NEGATIVE
SALMONELLA SP RPOD STL QL NAA+PROBE: NEGATIVE
SAO2 % BLDV: 74.1 % (ref 70–75)
SAPO I+II+IV+V RNA STL QL NAA+NON-PROBE: NEGATIVE
SARS-COV-2 RNA RESP QL NAA+PROBE: NEGATIVE
SHIGELLA SP+EIEC IPAH ST NAA+NON-PROBE: NEGATIVE
SYSTOLIC BLOOD PRESSURE - MUSE: NORMAL MMHG
T AXIS - MUSE: 64 DEGREES
V CHOLERAE DNA SPEC QL NAA+PROBE: NEGATIVE
VENTRICULAR RATE- MUSE: 132 BPM
VIBRIO DNA SPEC NAA+PROBE: NEGATIVE
Y ENTEROCOL DNA STL QL NAA+PROBE: NEGATIVE

## 2024-07-17 PROCEDURE — 82962 GLUCOSE BLOOD TEST: CPT

## 2024-07-17 PROCEDURE — 250N000011 HC RX IP 250 OP 636: Performed by: INTERNAL MEDICINE

## 2024-07-17 PROCEDURE — 83605 ASSAY OF LACTIC ACID: CPT | Performed by: INTERNAL MEDICINE

## 2024-07-17 PROCEDURE — 87635 SARS-COV-2 COVID-19 AMP PRB: CPT | Performed by: INTERNAL MEDICINE

## 2024-07-17 PROCEDURE — 87493 C DIFF AMPLIFIED PROBE: CPT | Performed by: INTERNAL MEDICINE

## 2024-07-17 PROCEDURE — 36415 COLL VENOUS BLD VENIPUNCTURE: CPT | Performed by: INTERNAL MEDICINE

## 2024-07-17 PROCEDURE — 82805 BLOOD GASES W/O2 SATURATION: CPT | Performed by: INTERNAL MEDICINE

## 2024-07-17 PROCEDURE — 99232 SBSQ HOSP IP/OBS MODERATE 35: CPT | Performed by: STUDENT IN AN ORGANIZED HEALTH CARE EDUCATION/TRAINING PROGRAM

## 2024-07-17 PROCEDURE — 250N000013 HC RX MED GY IP 250 OP 250 PS 637: Performed by: STUDENT IN AN ORGANIZED HEALTH CARE EDUCATION/TRAINING PROGRAM

## 2024-07-17 PROCEDURE — 999N000157 HC STATISTIC RCP TIME EA 10 MIN

## 2024-07-17 PROCEDURE — 87507 IADNA-DNA/RNA PROBE TQ 12-25: CPT | Performed by: INTERNAL MEDICINE

## 2024-07-17 PROCEDURE — 250N000013 HC RX MED GY IP 250 OP 250 PS 637: Performed by: INTERNAL MEDICINE

## 2024-07-17 PROCEDURE — 250N000012 HC RX MED GY IP 250 OP 636 PS 637: Performed by: STUDENT IN AN ORGANIZED HEALTH CARE EDUCATION/TRAINING PROGRAM

## 2024-07-17 PROCEDURE — 92526 ORAL FUNCTION THERAPY: CPT | Mod: GN

## 2024-07-17 PROCEDURE — 92610 EVALUATE SWALLOWING FUNCTION: CPT | Mod: GN

## 2024-07-17 PROCEDURE — 120N000002 HC R&B MED SURG/OB UMMC

## 2024-07-17 PROCEDURE — 99207 PR APP CREDIT; MD BILLING SHARED VISIT: CPT | Performed by: PHYSICIAN ASSISTANT

## 2024-07-17 RX ORDER — FLUTICASONE FUROATE AND VILANTEROL 100; 25 UG/1; UG/1
1 POWDER RESPIRATORY (INHALATION) DAILY
Status: DISCONTINUED | OUTPATIENT
Start: 2024-07-18 | End: 2024-07-24 | Stop reason: HOSPADM

## 2024-07-17 RX ORDER — ATORVASTATIN CALCIUM 40 MG/1
40 TABLET, FILM COATED ORAL EVERY EVENING
Status: DISCONTINUED | OUTPATIENT
Start: 2024-07-17 | End: 2024-07-24 | Stop reason: HOSPADM

## 2024-07-17 RX ORDER — DIGOXIN 125 MCG
125 TABLET ORAL DAILY
Status: ON HOLD | COMMUNITY
End: 2024-07-24

## 2024-07-17 RX ORDER — GABAPENTIN 250 MG/5ML
6 SOLUTION ORAL EVERY 8 HOURS
Status: ON HOLD | COMMUNITY
End: 2024-07-24

## 2024-07-17 RX ORDER — ALBUTEROL SULFATE 90 UG/1
2 AEROSOL, METERED RESPIRATORY (INHALATION) EVERY 4 HOURS PRN
Status: DISCONTINUED | OUTPATIENT
Start: 2024-07-17 | End: 2024-07-24 | Stop reason: HOSPADM

## 2024-07-17 RX ORDER — LOSARTAN POTASSIUM 50 MG/1
50 TABLET ORAL DAILY
Status: ON HOLD | COMMUNITY
End: 2024-07-24

## 2024-07-17 RX ORDER — DILTIAZEM HCL 60 MG
30 TABLET ORAL 3 TIMES DAILY
Status: ON HOLD | COMMUNITY
End: 2024-07-24

## 2024-07-17 RX ORDER — AMITRIPTYLINE HYDROCHLORIDE 10 MG/1
10 TABLET ORAL AT BEDTIME
Status: ON HOLD | COMMUNITY
End: 2024-07-24

## 2024-07-17 RX ORDER — METOPROLOL TARTRATE 100 MG
100 TABLET ORAL 2 TIMES DAILY
Status: ON HOLD | COMMUNITY
End: 2024-07-24

## 2024-07-17 RX ORDER — GUAIFENESIN 600 MG/1
15 TABLET, EXTENDED RELEASE ORAL DAILY
Status: DISCONTINUED | OUTPATIENT
Start: 2024-07-17 | End: 2024-07-24 | Stop reason: HOSPADM

## 2024-07-17 RX ORDER — AMITRIPTYLINE HYDROCHLORIDE 10 MG/1
10 TABLET ORAL AT BEDTIME
Status: DISCONTINUED | OUTPATIENT
Start: 2024-07-17 | End: 2024-07-19

## 2024-07-17 RX ORDER — DILTIAZEM HCL 60 MG
60 TABLET ORAL EVERY 8 HOURS SCHEDULED
Status: DISCONTINUED | OUTPATIENT
Start: 2024-07-17 | End: 2024-07-24 | Stop reason: HOSPADM

## 2024-07-17 RX ORDER — ALBUTEROL SULFATE 90 UG/1
2 AEROSOL, METERED RESPIRATORY (INHALATION) EVERY 4 HOURS PRN
Status: ON HOLD | COMMUNITY
End: 2024-07-24

## 2024-07-17 RX ORDER — ATORVASTATIN CALCIUM 40 MG/1
40 TABLET, FILM COATED ORAL EVERY EVENING
Status: ON HOLD | COMMUNITY
End: 2024-07-24

## 2024-07-17 RX ORDER — DEXTROSE MONOHYDRATE 100 MG/ML
INJECTION, SOLUTION INTRAVENOUS CONTINUOUS PRN
Status: DISCONTINUED | OUTPATIENT
Start: 2024-07-17 | End: 2024-07-24 | Stop reason: HOSPADM

## 2024-07-17 RX ORDER — GABAPENTIN 250 MG/5ML
300 SOLUTION ORAL EVERY 8 HOURS SCHEDULED
Status: DISCONTINUED | OUTPATIENT
Start: 2024-07-17 | End: 2024-07-24 | Stop reason: HOSPADM

## 2024-07-17 RX ORDER — GABAPENTIN 250 MG/5ML
300 SOLUTION ORAL EVERY 8 HOURS
Status: DISCONTINUED | OUTPATIENT
Start: 2024-07-17 | End: 2024-07-17

## 2024-07-17 RX ORDER — SENNOSIDES 8.6 MG
2 TABLET ORAL DAILY PRN
Status: ON HOLD | COMMUNITY
End: 2024-07-24

## 2024-07-17 RX ORDER — METOPROLOL TARTRATE 50 MG
100 TABLET ORAL 2 TIMES DAILY
Status: DISCONTINUED | OUTPATIENT
Start: 2024-07-17 | End: 2024-07-24 | Stop reason: HOSPADM

## 2024-07-17 RX ORDER — DIGOXIN 125 MCG
125 TABLET ORAL DAILY
Status: DISCONTINUED | OUTPATIENT
Start: 2024-07-17 | End: 2024-07-24 | Stop reason: HOSPADM

## 2024-07-17 RX ORDER — ACETAMINOPHEN 500 MG
500-1000 TABLET ORAL EVERY 6 HOURS PRN
COMMUNITY
End: 2024-07-29

## 2024-07-17 RX ORDER — ACETAMINOPHEN 325 MG/1
650 TABLET ORAL ONCE
Status: COMPLETED | OUTPATIENT
Start: 2024-07-17 | End: 2024-07-17

## 2024-07-17 RX ADMIN — DILTIAZEM HYDROCHLORIDE 60 MG: 60 TABLET, FILM COATED ORAL at 15:46

## 2024-07-17 RX ADMIN — METOPROLOL TARTRATE 100 MG: 50 TABLET, FILM COATED ORAL at 20:18

## 2024-07-17 RX ADMIN — ACETAMINOPHEN 650 MG: 325 TABLET, FILM COATED ORAL at 13:12

## 2024-07-17 RX ADMIN — DILTIAZEM HYDROCHLORIDE 60 MG: 60 TABLET, FILM COATED ORAL at 22:31

## 2024-07-17 RX ADMIN — INSULIN GLARGINE 8 UNITS: 100 INJECTION, SOLUTION SUBCUTANEOUS at 22:33

## 2024-07-17 RX ADMIN — AMITRIPTYLINE HYDROCHLORIDE 10 MG: 10 TABLET, FILM COATED ORAL at 22:40

## 2024-07-17 RX ADMIN — Medication 15 ML: at 16:34

## 2024-07-17 RX ADMIN — GABAPENTIN 300 MG: 250 SUSPENSION ORAL at 22:40

## 2024-07-17 RX ADMIN — PROCHLORPERAZINE EDISYLATE 5 MG: 5 INJECTION INTRAMUSCULAR; INTRAVENOUS at 17:50

## 2024-07-17 RX ADMIN — DIGOXIN 125 MCG: 125 TABLET ORAL at 15:45

## 2024-07-17 RX ADMIN — ATORVASTATIN CALCIUM 40 MG: 40 TABLET, FILM COATED ORAL at 22:31

## 2024-07-17 RX ADMIN — ACETAMINOPHEN 650 MG: 325 TABLET, FILM COATED ORAL at 17:55

## 2024-07-17 RX ADMIN — ACETAMINOPHEN 650 MG: 325 TABLET, FILM COATED ORAL at 04:13

## 2024-07-17 ASSESSMENT — ACTIVITIES OF DAILY LIVING (ADL)
ADLS_ACUITY_SCORE: 59
ADLS_ACUITY_SCORE: 61
ADLS_ACUITY_SCORE: 61
ADLS_ACUITY_SCORE: 59
ADLS_ACUITY_SCORE: 59
ADLS_ACUITY_SCORE: 61
ADLS_ACUITY_SCORE: 51
ADLS_ACUITY_SCORE: 59
ADLS_ACUITY_SCORE: 59
ADLS_ACUITY_SCORE: 51
ADLS_ACUITY_SCORE: 59
ADLS_ACUITY_SCORE: 51
ADLS_ACUITY_SCORE: 51
ADLS_ACUITY_SCORE: 61
ADLS_ACUITY_SCORE: 59
ADLS_ACUITY_SCORE: 61
ADLS_ACUITY_SCORE: 59
ADLS_ACUITY_SCORE: 59
ADLS_ACUITY_SCORE: 61
ADLS_ACUITY_SCORE: 59
ADLS_ACUITY_SCORE: 59
ADLS_ACUITY_SCORE: 61
ADLS_ACUITY_SCORE: 51

## 2024-07-17 NOTE — PHARMACY
Pharmacy Tube Feeding Consult    Medication reviewed for administration by feeding tube and for potential food/drug interactions.    Recommendation: No changes are needed at this time.     Pharmacy will continue to follow as new medications are ordered.    Altagracia Gallego, PharmD, [facility], BCPS

## 2024-07-17 NOTE — PROVIDER NOTIFICATION
07/17/24 1439   Call Information   Date of Call 07/17/24   Time of Call 1439   Name of person requesting the team Leticia VELAZQUEZ   Title of person requesting team RN   RRT Arrival time 1445   Time RRT ended 1455   Reason for call   Type of RRT Adult   Primary reason for call Cardiovascular   Cardiovascular Other (describe)  (A fib HR in 130's)   Was patient transferred from the ED, ICU, or PACU within last 24 hours prior to RRT call? Yes   SBAR   Situation Bedside RN concerned due to HR increase 70's 130's over the course of the shift. History of chronic Afib, home meds held per MD order on admission.   Background Per MD note: history of recent ischemic left middle cerebral artery stroke complicated by cerebral edema s/p decompressive hemicraniectomy complicated by hemorrhagic transformation secondary to therapeutic anticoagulation for atrial fibrillation old complicated by dysarthria and significant right-sided berhane-paralysis, oropharyngeal dysphagia s/p percutaneous gastrostomy tube on tube feeding, chronic migraine headache, paroxysmal atrial fibrillation on aspirin only given brain bleeding, chronic heart failure with mildly reduced ejection fraction, hypertension, type 2 diabetes mellitus, hyperlipidemia, chronic kidney disease stage III A, L parotid gland nodule, Bilateral moderate carotid stenosis, and COPD not on home oxygen.   Notable History/Conditions Cardiac;Neurological;Diabetes;COPD   Assessment Alert, answering questions.  Afib 's. BP stable, 's. Lab currently attempting to draw labs.   Interventions Other (describe)  (Home meds Digoxen and Diltiazem restarted per MD order.)   Patient Outcome   Patient Outcome Stabilized on unit   RRT Team   Attending/Primary/Covering Physician Meena Price MD/ [address]   Date Attending Physician notified 07/17/24   Time Attending Physician notified 1439   Physician(s) Nahomy Garrett PA-C   Lead RN Cassidy VELAZQUEZ   RT NA   Post RRT  Intervention Assessment   Post RRT Assessment Stable/Improved   Date Follow Up Done 07/17/24   Time Follow Up Done 1730   Comments Afib, 's. BP stable.

## 2024-07-17 NOTE — CODE/RAPID RESPONSE
Rapid Response Team Note    Assessment   A rapid response was called on Roya Burgos due to  afib with RVR . This presentation is likely due to patient's home medications being held on admission (rate control and anti-arrhythmic).    Plan   - Primary team resuming home medications  - Patient refused labs  - Will hold on fluids or IV rate control given pressures are stable, patient asymptomatic  - Goal for rates 130s or less  -  The Internal Medicine primary team was able to be reached and they are in agreement with the above plan.  -  Disposition: The patient will remain on the current unit. We will continue to monitor this patient closely.  -  Reassessment and plan follow-up will be performed by the primary team    Nahomy Garrett PA-C  Rapid Response Team [facility]  Securely message with [employer]     Medical Decision Making       15 MINUTES SPENT BY ME on the date of service doing chart review, history, exam, documentation & further activities per the note.      Hospital Course   Brief Summary of events leading to rapid response:   Patient in atrial fibrillation with occasional rates up to 150s.  Has since corrected to 130s.  Blood pressure normotensive to hypertensive.  Patient asymptomatic.  Denies any chest pain or shortness of breath.  EKG noting A-fib with RVR.    Physical Exam   Vital Signs: Temp: 98.1  F (36.7  C) Temp src: Oral BP: (!) 153/75 Pulse: 108   Resp: 20 SpO2: 94 % O2 Device: None (Room air)    Weight: 237 lbs 0 oz    Exam:   General Appearance: Awake.  Alert.  Answering questions appropriately.  Respiratory: Normal work of breathing on room air.  Lungs clear to auscultation bilaterally.  Cardiovascular: Irregularly, irregular.  GI: Soft, nontender.  Skin: Warm, dry.

## 2024-07-17 NOTE — PLAN OF CARE
"Shift: 1900-0730      Neuro: A&Ox4, pt calls appropriately. She lets her needs be known. Pt expressed frustration to this writer, about being here and being admitted. Pt stated \"I just want to go home\". RN notified provider, Pts daughter called and talked to pt and RN reviewed plan with pt. Pt calm for majority of the shift. Pt has significant right sided weakness, with paralysis of right arm, pt has very minimal movement in right leg.   VS: VSS  Respiratory: RA, lung sounds clear, no complaints of SOB.  Pain: Pt had a headache, PRN tylenol given once.  GI/: No BM during the shift, Pt incontinent to urine and stool. No complaints of N/V.  Diet: NPO  BG/Insulin: AC & HS BG checks  IV/Drips: Left PIV, SL  Activity: Bed bound, lift assist. Q2h turns  Skin/Drains: PEG tube  Replacements: Pt refused labs, MD notified. Pt again refused AM labs.    P: Report Changes to treatment team Gold,      "

## 2024-07-17 NOTE — PROGRESS NOTES
Physician Attestation   I, Meena Price MD, was present with the medical/[facility] student who participated in the service and in the documentation of the note.  I have verified the history and personally performed the physical exam and medical decision making.  I agree with the assessment and plan of care as documented in the note.      Key findings: Pt appears to be at baseline mental status, updated daughter who is in agreement w/ dispo plan for long term living facility. Pt intermittently refusing labs.     Please see A&P for additional details of medical decision making.    I have personally reviewed the following data over the past 24 hrs:    TSH: N/A T4: N/A A1C: N/A     Procal: N/A CRP: N/A Lactic Acid: 2.1 (H)           Meena Price MD  Date of Service (when I saw the patient): 07/17/24    [facility]    Progress Note - Hospitalist Service, GOLD TEAM 10       Date of Admission:  7/16/2024    Assessment & Plan   Roya Burgos is a 69 year old female admitted on 7/16/2024 for evaluation of AMS. She has PMHx of recent ischemic left MCA stroke c/b cerebral edema s/p decompressive hemicraniectomy c/b hemorrhagic transformation 2/2 therapeutic anticoagulation for atrial fibrillation old complicated by dysarthria and significant right-sided berhane-paralysis, oropharyngeal dysphagia s/p percutaneous gastrostomy tube on tube feeding, chronic migraine headache, paroxysmal atrial fibrillation on aspirin only given brain bleeding, chronic heart failure with mildly reduced ejection fraction, hypertension, type 2 diabetes mellitus, hyperlipidemia, chronic kidney disease stage III A, L parotid gland nodule, Bilateral moderate carotid stenosis, and COPD not on home oxygen.  The patient had frequent hospitalization in [facility] and was recently discharged on 7/14/2024 for care at home with home visiting nurse service, but returned as daughter was unable to care for her  at home. .       Changes today 7/17:  - Speech-language pathology evaluation. IDDSI 4 and thin liquids.  - Obtain blood work, ordered and pending. Patient previously refused, but accepted after communicating with the primary team and her daughter via phone.  - Tylenol 650mg for headache.  - Follow urine culture.  - Pharmacy evaluation with no recommended changes to meds. No potential food/drug interactions   or causes for encephalopathy.  - patient appears to be back to baseline mental status wise     AMS, resolved  Reported to be agitated and not herself. Labs obtained on admission thus far unrevealing (CXR negative, UA neg, digoxin level wnl, CT head neg). VBG w/ no hypercarbia, LA mildly elevated at 2.1. Cdiff and enteric panel neg. Covid neg. On our interview on 7/17, pt is conversant and does not appear altered. It appears that she has returned back to baseline.   -Ordered inflammatory markers, and blood cultures given fever reported prior to admission; patient on and off refusing labs   -Ordered ammonia level    -Holding off on antibiotics given lack of evidence of sepsis and lack of clear infectious source  -Neurochecks every 4 hours for the first 48 hours of admission  -Requested a pharmacy consult to review etiologies for acute toxic encephalopathy.   - No changes needed after review of meds.   - Pharmacy will continue to follow as new meds are ordered.       Recent ischemic left MCA stroke c/b cerebral edema s/p decompressive hemicraniectomy c/b hemorrhagic transformation secondary to therapeutic anticoagulation  Dysarthria   Significant right-sided berhane-paralysis  oropharyngeal dysphagia s/p percutaneous gastrostomy tube on tube feeding  -Will resume aspirin once we ensure there is no active brain bleeding- pt reports not taking, will verify  -Requested dietitian consult to resume tube feeding.  - SPL consulted.   - Initiate pureed (IDDSI 4) diet and thin liquids.   - SLP will follow for dysphagia  management and trialing advanced solids when pt's dentures are present.  -PTA Atorvastatin.  -The patient will need neurosurgery follow-up in 6 weeks based on documentation from prior hospital     Acute kidney injury, akin stage I on top of CKD stage III A, POA   Cr baseline 1-1.5 (2024) and Cr 1.5 on admission. BUN/Cr ~19, likely prerenal.  -Strict intake and output and daily body weight  -Hold PTA losartan    Afib  Digoxin levels wnl. Pt w/ afib w/ RVR earlier on 7/17 w/out home meds ordered, otherwise HDS. Will resume home meds  - PTA metoprolol tartrate 100 mg BID, diltiazem 60 mgs Q8h, digoxin 125mcg Daily   - telemetry  - pt not on AC 2/2 hemorrhagic stroke hx     Chronic heart failure with mildly reduced ejection fraction LVEF 46%TTE from an outside hospital (6/6/24):  1. Limited echocardiogram.  2. Mild LV enlargement with mildly increased wall thickness. Calculated biplane LVEF 46%. Mild global hypokinesis. No LV thrombus.  3. Mild RV enlargement with mildly decreased systolic function. Estimated PASP 25 mmHg + RA Pressure.   -Consider switching metoprolol tartrate to metoprolol succinate prior to discharge for HF   -Consider switching losartan to valsartan once acute kidney injury resolves then eventually initiation of Entresto  - resume PTA empagliflozin     T2DM, Uncontrolled (7/15/2024 @ 7.4)  Hemoglobin A1c at [facility] was 9.2 on 4/4/2024. Repeat on 7/15 7.4   - resume PTA empagloflozin, lantus 8 units  -Started insulin NovoLog coverage  -Will hold off on metformin in setting of acute kidney injury  -Resume gabapentin 300 mg 3 times daily     Uncontrolled hypertension  - holding losartan currently due to ALDO  - resume PTA metoprolol as above      Migraine headache  - resume PTA amitriptyline  - tylenol PRN    COPD not on home oxygen  Breathing comfortably on RA  - resume PTA inhaler (fluticasone vilanterol)  - Duonebs PRN        Need for placement into a transitional care unit versus long-term  "care  Daughter Miles on phone and discussed that she is agreeable for placement for patient as she was unable to care for her at home due to her medical needs.   -Case management/social work were consulted in the emergency department             Diet: NPO for Medical/Clinical Reasons Except for: Meds  Adult Formula Bolus Feeding: Jevity 1.5; Route: Gastrostomy; 5 times daily; Volume per Bolus: 1; Can(s)/ Carton(s); Rec giving as 1 carton @ [id number] or per pt preference    DVT Prophylaxis: Pneumatic Compression Devices  Rhodes Catheter: Not present  Fluids: None  Lines: None     Cardiac Monitoring: ACTIVE order. Indication: QTc prolonging medication (48 hours)  Code Status: Full Code      Clinically Significant Risk Factors Present on Admission                # Drug Induced Platelet Defect: home medication list includes an antiplatelet medication   # Hypertension: Noted on problem list            # DMII: A1C = 7.4 % (Ref range: <5.7 %) within past 6 months    # Obesity: Estimated body mass index is 39.44 kg/m  as calculated from the following:    Height as of this encounter: 1.651 m (5' 5\").    Weight as of this encounter: 107.5 kg (237 lb).              Disposition Plan      Expected Discharge Date: 07/18/2024                The patient's care was discussed with the Attending Physician, Dr. Meena Price .    Glen Treadwell  Medical Student  Hospitalist Service, GOLD TEAM 10  [facility]  Securely message with [employer] (more info)  Text page via [facility] Paging/Directory   See signed in provider for up to date coverage information  ______________________________________________________________________    Interval History   No acute events overnight. Nursing notes reviewed. Patient seen this morning during rounds in the ED. Patient was previously declining any additional blood work. She states that she already gave blood. Team explained the results of previous " testing and need for additional work to rule out organic causes of AMS. Patient reports a bilateral frontal headache that is throbbing in nature and rated at 10/10. Denies any nausea, vomiting, changes in vision. Reports eating soft pureed food at home in combination with food via PEG. Denies any chest pain, shortness of breath, abdominal pain, abdominal cramping. Has been voiding spontaneously and last BM 7/16. Reports using a walker at home.    Physical Exam   Vital Signs: Temp: 98.8  F (37.1  C) Temp src: Oral BP: (!) 145/97 Pulse: 108   Resp: 18 SpO2: 98 % O2 Device: None (Room air)    Weight: 237 lbs 0 oz    Constitutional: awake, alert, cooperative, no apparent distress, lying down on ED hospital bed in the hallway.  Eyes: Lids and lashes normal, pupils equal, extra ocular muscles intact, sclera clear, conjunctiva normal  Respiratory: No increased work of breathing on room air, good air exchange, clear to auscultation bilaterally, no crackles or wheezing  Cardiovascular: RRR, normal S1 and S2, did not appreciate any murmur, rubs or gallops.  GI: normal bowel sounds, soft, non-distended, non-tender, PEG in place, c/d/I.  Skin: Warm and dry. No lower extremity pitting edema. No suspicious rash on exposed skin.  Musculoskeletal: Right-sided hemiparesis.  Neurologic: Awake, alert, oriented to name, place and time.    Medical Decision Making       Please see A&P for additional details of medical decision making.      Data     I have personally reviewed the following data over the past 24 hrs:    10.0  \   11.0 (L)   / 414     141 106 28.9 (H) /  131 (H)   5.1 25 1.50 (H) \     ALT: 14 AST: 27 AP: 105 TBILI: 0.4   ALB: 3.5 TOT PROTEIN: 8.3 LIPASE: N/A     Trop: N/A BNP: 1,918 (H)     TSH: N/A T4: N/A A1C: 7.4 (H)     Procal: 0.18 CRP: 30.50 (H) Lactic Acid: N/A         Imaging results reviewed over the past 24 hrs:   Recent Results (from the past 24 hour(s))   XR Chest Port 1 View    Narrative    EXAM: XR CHEST PORT  1 VIEW  LOCATION: [facility]  DATE: 7/16/2024    INDICATION: subjective fever, cough, post stroke, high aspiration risk  COMPARISON: Chest radiograph 6/8/2024      Impression    IMPRESSION: Low lung volumes with bronchovascular crowding. No focal consolidation, pleural effusion or pneumothorax. Cardiomediastinal silhouette is unremarkable.   CT Head w/o Contrast    Narrative    EXAM: CT HEAD W/O CONTRAST  LOCATION: [facility]  DATE: 7/16/2024    INDICATION: Change of mental status, recent left MCA s/p carniotomy with bleeding.  COMPARISON: 7/13/2024.  TECHNIQUE: Routine CT Head without IV contrast. Multiplanar reformats. Dose reduction techniques were used.    FINDINGS:  INTRACRANIAL CONTENTS: Ischemic changes in the left cerebral hemisphere with overlying craniectomy. Small chronic lacunar infarct in the left cerebellar hemisphere again noted. No CT evidence of acute infarct. Normal parenchymal attenuation. Normal   ventricles and sulci.     VISUALIZED ORBITS/SINUSES/MASTOIDS: No intraorbital abnormality. No paranasal sinus mucosal disease. No middle ear or mastoid effusion.    BONES/SOFT TISSUES: No acute abnormality. Indeterminate left parotid nodule again noted.      Impression    IMPRESSION:  1.  No acute intracranial abnormality or significant change compared to the prior study.

## 2024-07-17 NOTE — PHARMACY-CONSULT NOTE
Pharmacy consulted to review medications that may impair mental status that started on 7/16.    Home medication list reviewed and verified with patient there are no new medications that patient recently started.    However medications that could potentially cause impaired  mental status include:  Gabapentin, Amitriptyline, Trazodone, and Percocet BUT these are not new medications to the patient.    Lamont Dupont

## 2024-07-17 NOTE — PROGRESS NOTES
RT responded to RRT called on pt. RT not needed at this time. RT dismissed by provider.    Abi Serrano, RT on 7/17/2024 at 4:14 PM

## 2024-07-17 NOTE — H&P
[facility]    History and Physical - Hospitalist Service, GOLD TEAM        Date of Admission:  7/16/2024    Assessment & Plan      69-year-old female who presented for evaluation of change of mental status.  The patient has a history of recent ischemic left middle cerebral artery stroke complicated by cerebral edema s/p decompressive hemicraniectomy complicated by hemorrhagic transformation secondary to therapeutic anticoagulation for atrial fibrillation old complicated by dysarthria and significant right-sided berhane-paralysis, oropharyngeal dysphagia s/p percutaneous gastrostomy tube on tube feeding, chronic migraine headache, paroxysmal atrial fibrillation on aspirin only given brain bleeding, chronic heart failure with mildly reduced ejection fraction, hypertension, type 2 diabetes mellitus, hyperlipidemia, chronic kidney disease stage III A, L parotid gland nodule, Bilateral moderate carotid stenosis, and COPD not on home oxygen.  The patient had frequent hospitalization in [facility] and was recently discharged on 7/14/2024 for care at home with home visiting nurse service.    On 7/16, the patient was reported to be combative to family, aggressive, and disrespectful, which are not consistent with the patient usual behavior, she was also noted to have subjective fever, be hypertensive with systolic blood pressure of 160, and with urinary frequency.  As such, the patient daughter brought the patient to the emergency department for further evaluation.    In the ED, the patient was found to have blood pressure of 173/74, heart rate 93, CRP 30, urinalysis with 4 white blood cells, clear chest x-ray,.  Medicine was called for further management.    Acute metabolic encephalopathy versus acute toxic encephalopathy for further workup, POA  This is the main problem that led to this admission.  Given the change of behavior, I am concerned for an underlying organic  etiology.  Given her hypertension and recent ischemic stroke complicated by cerebral edema s/p craniotomy complicated by intracranial bleeding, will need to rule out a central process.  Her urinalysis without significant pyuria.  No significant history of respiratory illness with clear chest x-ray Effectively ruling out pneumonia or bronchitis.  The patient is on digoxin which may present with altered mental status in case of toxicity.  The patient also had a recent hospitalization for acute metabolic encephalopathy that improved significantly with intravenous fluids.  -Order digoxin level  -Ordered inflammatory markers, lactic acid, and blood cultures given fever reported prior to admission  -Obtain the chest x-ray and added on urine culture  -Ordered CT head  -Ordered ammonia level and venous blood gas  -Holding off on antibiotics given lack of evidence of sepsis and lack of clear infectious source  -Neurochecks every 4 hours for the first 48 hours of admission  -Requested a pharmacy consult to review etiologies for acute toxic encephalopathy  -Ordered half a bolus of LR to see if that would help treat dehydration and maybe hopefully improve mental status    2.  Acute kidney injury, akin stage I on top of CKD stage III A, POA  This is the second problem being addressed this admission.   -Strict intake and output and daily body weight  -Ordered fractional secretion of sodium  -Ordered renal ultrasound to rule out obstructive physiology  -Holding losartan that the patient is on for her hypertension  -Will continue metoprolol once we ensure there is no new stroke, but will switch metoprolol to tartrate 100 mg twice daily to metoprolol succinate 200 mg daily which is approved in heart failure with reduced ejection fraction  -Daily kidney function testing    3.  Chronic heart failure with mildly reduced ejection fraction LVEF 46% and paroxysmal atrial fibrillation not on anticoagulation, POA  TTE from an outside  hospital (6/6/24):  1. Limited echocardiogram.  2. Mild LV enlargement with mildly increased wall thickness. Calculated biplane LVEF 46%. Mild global hypokinesis. No LV thrombus.  3. Mild RV enlargement with mildly decreased systolic function. Estimated PASP 25 mmHg + RA Pressure.   Obtained NT proBNP, cannot be directly compared to BNP that was obtained in the [facility].  The patient does not really ambulate as such cannot really tell me about her functional status based on her dyspnea.  However she is able to lie flat and I was not able to escalate any and inspiratory rales on my evaluation.  -Switch metoprolol tartrate to metoprolol succinate  -Stop diltiazem given its negative inotropic effect  -Will consider switching losartan to valsartan once acute kidney injury resolves then eventually initiation of Entresto  -Will hold off on diuretics in the setting of acute kidney injury without kath evidence of volume overload  -Will resume Jardiance after medication confirmation    4.  Uncontrolled type 2 diabetes mellitus complicated by neuropathy, POA  Hemoglobin A1c at [facility] was 9.2 on 4/4/2024.    -Ordered a follow-up A1c  -Will hold off on resumption of insulin glargine and Jardiance until we verify medications  -Started insulin NovoLog coverage  -Will hold off on metformin in setting of acute kidney injury  -Will resume gabapentin 300 mg 3 times daily once we review medications and also once kidney function improves    5.  Uncontrolled hypertension, POA  -Restart metoprolol succinate at 200 mg daily after we ensure there is no acute stroke as detailed above  -We will switch losartan to valsartan as detailed above    6.  Migraine headache, POA  -Will resume amitriptyline once we review medications    7.  COPD not on home oxygen, POA  No evidence of exacerbation.  We will utilize DuoNeb on as-needed basis.    8. Recent ischemic left middle cerebral artery stroke complicated by cerebral edema s/p decompressive  "hemicraniectomy complicated by hemorrhagic transformation secondary to therapeutic anticoagulation for atrial fibrillation old complicated by dysarthria and significant right-sided berhane-paralysis, oropharyngeal dysphagia s/p percutaneous gastrostomy tube on tube feeding, POA  -Will resume aspirin once we ensure there is no active brain bleeding  -Requested dietitian consult to resume tube feeding  -Requested a formal speech and swallow evaluation and made the patient n.p.o. for now  -Atorvastatin to be resumed once we confirm home medications  -The patient will need neurosurgery follow-up in 6 weeks based on documentation from prior hospital    9.  Need for placement into a transitional care unit versus long-term care, POA  -Case management/social work were consulted in the emergency department          Diet:  N.p.o. for medical reason to ensure lack of aspiration  DVT Prophylaxis: Pneumatic Compression Devices  Rhodes Catheter: Not present  Lines: None     Cardiac Monitoring: None  Code Status:  Full    Clinically Significant Risk Factors Present on Admission                # Drug Induced Platelet Defect: home medication list includes an antiplatelet medication   # Hypertension: Noted on problem list             # Obesity: Estimated body mass index is 39.44 kg/m  as calculated from the following:    Height as of this encounter: 1.651 m (5' 5\").    Weight as of this encounter: 107.5 kg (237 lb).              Disposition Plan     Medically Ready for Discharge: Anticipated in 2-4 Days           Stephenie Baca MD  Hospitalist Service, [facility]  Securely message with [employer] (more info)  Text page via [employer] Paging/Directory   See signed in provider for up to date coverage information    ______________________________________________________________________    Chief Complaint   Change of mental status    History is obtained from the patient and patient's " daughter    History of Present Illness   69-year-old female who presented for evaluation of change of mental status.  The patient has a history of recent ischemic left middle cerebral artery stroke complicated by cerebral edema s/p decompressive hemicraniectomy complicated by hemorrhagic transformation secondary to therapeutic anticoagulation for atrial fibrillation old complicated by dysarthria and significant right-sided berhane-paralysis, oropharyngeal dysphagia s/p percutaneous gastrostomy tube on tube feeding, chronic migraine headache, paroxysmal atrial fibrillation on aspirin only given brain bleeding, chronic heart failure with mildly reduced ejection fraction, hypertension, type 2 diabetes mellitus, hyperlipidemia, chronic kidney disease stage III A, L parotid gland nodule, Bilateral moderate carotid stenosis, and COPD not on home oxygen.  The patient had frequent hospitalization in [facility] and was recently discharged on 7/14/2024 for care at home with home visiting nurse service.    On 7/16, the patient was reported to be combative to family, aggressive, and disrespectful, which are not consistent with the patient usual behavior, she was also noted to have subjective fever, be hypertensive with systolic blood pressure of 160, and with urinary frequency.  As such, the patient daughter brought the patient to the emergency department for further evaluation.    In the ED, the patient was found to have blood pressure of 173/74, heart rate 93, CRP 30, urinalysis with 4 white blood cells, clear chest x-ray,.  Medicine was called for further management.      Past Medical History    Past Medical History:   Diagnosis Date    Diabetes mellitus, type II, insulin dependent (H)     HTN (hypertension)     Major depression        Past Surgical History   Past Surgical History:   Procedure Laterality Date    ANKLE SURGERY      IR LOWER EXTREMITY ANGIOGRAM LEFT  4/6/2016    IR LOWER EXTREMITY ANGIOGRAM LEFT   2/3/2016       Prior to Admission Medications   Prior to Admission Medications   Prescriptions Last Dose Informant Patient Reported? Taking?   ASPIRIN PO   Yes No   Sig: Take 81 mg by mouth daily   DM-Phenylephrine-acetaminophen 10-5-325 MG CAPS   Yes No   Dextromethorphan-guaiFENesin  MG/5ML LIQD   No No   Sig: Take 15 mLs by mouth 4 times daily as needed   METFORMIN HCL PO   Yes No   Sig: Take 1,000 mg by mouth 2 times daily   insulin aspart (NOVOLOG FLEXPEN) 100 UNIT/ML soln   Yes No   Sig: Inject 10 Units Subcutaneous 3 times daily (with meals)   insulin glargine (LANTUS VIAL) 100 UNIT/ML soln   Yes No   Sig: Inject 20 Units Subcutaneous At Bedtime   lisinopril-hydrochlorothiazide (PRINZIDE,ZESTORETIC) 20-12.5 MG per tablet   Yes No   Sig: Take 1 tablet by mouth every morning   oxyCODONE-acetaminophen (PERCOCET) 5-325 MG per tablet   Yes No   Sig: Take 1 tablet by mouth 2 times daily as needed      Facility-Administered Medications: None      2023 UPDATE: these sections are not required for     Physical Exam   Vital Signs: Temp: 97.4  F (36.3  C) Temp src: Oral BP: (!) 173/74 Pulse: 86   Resp: 14 SpO2: 96 % O2 Device: None (Room air)    Weight: 237 lbs 0 oz    Constitutional: Awake, alert, cooperative, no apparent distress  Respiratory: Clear to auscultation bilaterally, no crackles or wheezing  Cardiovascular: Regular rate and rhythm, normal S1 and S2, and no murmur noted  GI: Normal bowel sounds, soft, non-distended, non-tender  Skin/Integumen: No rashes, no cyanosis, no edema  Other: Right-sided hemiparesis is noted    Medical Decision Making       60 MINUTES SPENT BY ME on the date of service doing chart review, history, exam, documentation & further activities per the note.      Data     I have personally reviewed the following data over the past 24 hrs:    10.0  \   11.0 (L)   / 414     141 106 28.9 (H) /  114 (H)   5.1 25 1.50 (H) \     ALT: 14 AST: 27 AP: 105 TBILI: 0.4   ALB: 3.5 TOT PROTEIN: 8.3  LIPASE: N/A     Trop: N/A BNP: 1,918 (H)     Procal: 0.18 CRP: 30.50 (H) Lactic Acid: N/A         Imaging results reviewed over the past 24 hrs:   Recent Results (from the past 24 hour(s))   XR Chest Port 1 View    Narrative    EXAM: XR CHEST PORT 1 VIEW  LOCATION: [facility]  DATE: 7/16/2024    INDICATION: subjective fever, cough, post stroke, high aspiration risk  COMPARISON: Chest radiograph 6/8/2024      Impression    IMPRESSION: Low lung volumes with bronchovascular crowding. No focal consolidation, pleural effusion or pneumothorax. Cardiomediastinal silhouette is unremarkable.

## 2024-07-17 NOTE — CONSULTS
Care Management Follow Up    Length of Stay (days): 1    Expected Discharge Date: 07/18/2024     Concerns to be Addressed:   Discharge planning  Patient plan of care discussed at interdisciplinary rounds: Yes    Anticipated Discharge Disposition:  LTC facility     Anticipated Discharge Services:  TBD  Anticipated Discharge DME:  TBD    Patient/family educated on Medicare website which has current facility and service quality ratings:  yes  Education Provided on the Discharge Plan:  yes  Patient/Family in Agreement with the Plan:  Family (pt's daughter is in agreement).     Referrals Placed by CM/SW:  yes  Private pay costs discussed:  Not at this time.    Additional Information:    , covering for ED/OBS received a consult for discharge planning and help with finding a LTC facility for patient for discharge. Pt was previously living with her daughter, but care is becoming difficult and daughter is no longer able to care for her mom (pt).    JOS called daughter Miles # [phone] to verify where she would prefer referrals to be sent. Pt's daughter explained that she had a chance to look at the list given by medicare.gov from JOS ([employer]). Pt's daughter is wanting a facility 4* or above and would like something close to [facility], but doesn't have to be if a nice facility is found outside of [facility].   TCU referrals were sent to the facilities requested by pt's daughter via [employer].    LTC facility cost will be covered by pt's MA.    Pending LTC referrals placed 7/17:    [place], [place]  # [phone]    [place], [place]  # [phone]    [place], [place]  # [phone]    [facility], [place], [place]  # [phone]    [facility], [place], [place]  # [phone]    [facility], [place], [place]  # [phone]    [place], [place]  # [phone]    Pt's daughter was also interested in  [facility], but their wait list is CLOSED for LTC.     SW/RNCC will continue to follow up on LTC referrals, for discharge, and any other needs that may arise.  Please keep pt's daughter, Miles # [phone] updated on referrals and communicate if more referrals need to be made.    LUBA Rodríguez, LGSW  Coverage   [facility]  [email or web address]

## 2024-07-17 NOTE — MEDICATION SCRIBE - ADMISSION MEDICATION HISTORY
Medication Scribe Admission Medication History    Admission medication history is complete. The information provided in this note is only as accurate as the sources available at the time of the update.    Information Source(s): Family member and Clinic records via in-person    Pertinent Information:   The following information was gathered from Chart Review note date 07/14/2024 Hospital Encounter Summary-[facility], the patient's daughter and Care Everywhere. Patient was unable to given any information regarding her medication regimen.    These medications listed on the PTA list but are not being taken by patient at this time  ASPIRIN PO Take 81 mg by mouth daily   DM-Phenylephrine-acetaminophen 10-5-325 MG CAPS   insulin aspart (NOVOLOG FLEXPEN) 100 UNIT/ML soln     Inject 10 Units Subcutaneous 3 times daily (with meals)   insulin glargine (LANTUS VIAL) 100 UNIT/ML soln     Inject 20 Units Subcutaneous At Bedtime   lisinopril-hydrochlorothiazide (PRINZIDE,ZESTORETIC) 20-12.5 MG per tablet     Take 1 tablet by mouth every morning     According to the medication list date (07/14/2024) from  a [facility] Encounter  the following medications were to be taken by the patient for 30 days  digoxin (LANOXIN) 125 MCG tablet  start 6/28/2024 end date 8/13/2024     Take 125 mcg by mouth daily   diltiazem (CARDIZEM) 60 MG tablet  start 7/14/2024 end date 8/13/2024     Take 30 mg by mouth 3 times daily Administer 1/2 tablet (30 mg) into feeding tube at noon 3 times a day   gabapentin (NEURONTIN) 250 MG/5ML solution   start date 6/28/2024 end date 7/28/2024     Take 6 mLs by mouth every 8 hours   insulin glargine (LANTUS PEN) 100 UNIT/ML pen  start date 7/01/2024 end date 01/04/2025     Inject 8 Units subcutaneously at bedtime     Patient's daughter has been giving her the gabapentin 250 mg  every 8 hours on an as needed basis because her mother complained that the medication kept her from sleeping      Changes made to  PTA medication list:  Added:   acetaminophen (TYLENOL) 500 MG tablet     Take 500-1,000 mg by mouth every 6 hours as needed for mild pain   albuterol (PROAIR HFA/PROVENTIL HFA/VENTOLIN HFA) 108 (90 Base) MCG/ACT inhaler  Inhale 2 puffs into the lungs every 4 hours as needed for shortness of breath, wheezing or cough   amitriptyline (ELAVIL) 10 MG tablet     Take 10 mg by mouth at bedtime   atorvastatin (LIPITOR) 40 MG tablet     Take 40 mg by mouth every evening   digoxin (LANOXIN) 125 MCG tablet     Take 125 mcg by mouth daily   empagliflozin (JARDIANCE) 10 MG TABS tablet     Take 10 mg by mouth or Feeding Tube daily   gabapentin (NEURONTIN) 250 MG/5ML solution     Take 6 mLs by mouth every 8 hours   insulin glargine (LANTUS PEN) 100 UNIT/ML pen     Inject 8 Units subcutaneously at bedtime   losartan (COZAAR) 50 MG tabl   metoprolol tartrate (LOPRESSOR) 100 MG tablet     Take 100 mg by mouth 2 times daily   mometasone-formoterol (DULERA) 100-5 MCG/ACT inhaler     Inhale 2 puffs into the lungs 2 times daily   sennosides (SENOKOT) 8.6 MG tablet     Take 2 tablets by mouth daily as needed for constipation   diltiazem (CARDIZEM) 60 MG tablet     Take 30 mg by mouth 3 times daily Administer 1/2 tablet (30 mg) into feeding tube at noon 3 times a day. When medication scribe interviewed patient's daughter it appeared from what she was telling writer over the phone that the daughter was giving the patient diltiazem  60 mg 3 times a day    Deleted:   ASPIRIN PO Take 81 mg by mouth daily   DM-Phenylephrine-acetaminophen 10-5-325 MG CAPS   insulin aspart (NOVOLOG FLEXPEN) 100 UNIT/ML soln     Inject 10 Units Subcutaneous 3 times daily (with meals)   insulin glargine (LANTUS VIAL) 100 UNIT/ML soln     Inject 20 Units Subcutaneous At Bedtime   lisinopril-hydrochlorothiazide (PRINZIDE,ZESTORETIC) 20-12.5 MG per tablet     Take 1 tablet by mouth every morning     Changed: None    Allergies reviewed with patient and updates made in  EHR: yes    Medication History Completed By: Shari Valdez 7/17/2024 4:30 PM    No outpatient medications have been marked as taking for the 7/16/24 encounter (Hospital Encounter).

## 2024-07-17 NOTE — PROGRESS NOTES
"Speech-Language Pathology: Clinical Swallow Evaluation     07/17/24 1000   Appointment Info   Signing Clinician's Name / Credentials (SLP) Ana Matias MS, CCC-SLP   General Information   Onset of Illness/Injury or Date of Surgery 07/16/24   Referring Physician Stephenie Baca MD   Pertinent History of Current Problem Per EMR review, \"69-year-old female who presented for evaluation of change of mental status.  The patient has a history of recent ischemic left middle cerebral artery stroke complicated by cerebral edema s/p decompressive hemicraniectomy complicated by hemorrhagic transformation secondary to therapeutic anticoagulation for atrial fibrillation old complicated by dysarthria and significant right-sided berhane-paralysis, oropharyngeal dysphagia s/p percutaneous gastrostomy tube on tube feeding, chronic migraine headache, paroxysmal atrial fibrillation on aspirin only given brain bleeding, chronic heart failure with mildly reduced ejection fraction, hypertension, type 2 diabetes mellitus, hyperlipidemia, chronic kidney disease stage III A, L parotid gland nodule, Bilateral moderate carotid stenosis, and COPD not on home oxygen.  The patient had frequent hospitalization in [facility] and was recently discharged on 7/14/2024 for care at home with home visiting nurse service.\" Clinical swallow evaluation completed per MD orders.   General Observations Alert & cooperative. Pt with minimally dysarthric speech, but strongly suspect d/t pt edentulous and with no dentures in place is contributing   Type of Evaluation   Type of Evaluation Swallow Evaluation   Oral Motor   Oral Musculature anomalies present   Structural Abnormalities none present   Mucosal Quality good   Dentition (Oral Motor)   Comment, Dentition (Oral Motor) Pt reports she owned upper and lower dentures. Pt's daughter reported that her lower dentures were lost during previous hospitalization.   Dentition (Oral Motor) " edentulous;dental appliance/dentures   Dental Appliance/Denture (Oral Motor) upper and lower;not present during evaluation   Facial Symmetry (Oral Motor)   Facial Symmetry (Oral Motor) right side impairment   Right Side Facial Asymmetry moderate impairment   Lip Function (Oral Motor)   Lip Range of Motion (Oral Motor) WNL   Lip Strength (Oral Motor) WNL   Tongue Function (Oral Motor)   Tongue Strength (Oral Motor) WFL   Tongue Coordination/Speed (Oral Motor) reduced rate   Tongue ROM (Oral Motor) WNL   Jaw Function (Oral Motor)   Jaw Function (Oral Motor) range of motion impairment   Jaw Range of Motion Impairment right side;minimal impairment   Vocal Quality/Secretion Management (Oral Motor)   Vocal Quality (Oral Motor) WNL   Secretion Management (Oral Motor) WNL   General Swallowing Observations   Past History of Dysphagia None via EMR review. Per pt & pt's daughter (via phone w/ pt's verbal permission, pt initially NPO during previous hospitalization until SLP evaluation. SLP recommended softened solids and thin liquids. Pt d/c to home on [facility] soft/ETC type diet and thin liquids. Pt & pt's daughter denies any difficulty chewing softened foods or tender meats. Pt's daughter reports supplementing d/t poor nutrition intermittently with G-tube, but uncommon now at home.   Respiratory Support room air   Current Diet/Method of Nutritional Intake (General Swallowing Observations, NIS) NPO;gastrostomy tube (PEG)   Swallowing Evaluation Clinical swallow evaluation   Clinical Swallow Evaluation   Feeding Assistance minimal assistance required   Clinical Swallow Evaluation Textures Trialed thin liquids;pureed;soft & bite-sized   Clinical Swallow Eval: Thin Liquid Texture Trial   Mode of Presentation, Thin Liquids spoon;cup;straw;self-fed;fed by clinician   Volume of Liquid or Food Presented ~3 oz   Oral Phase of Swallow WFL   Pharyngeal Phase of Swallow intact   Diagnostic Statement No overt s/s of aspiration.   Clinical  Swallow Evaluation: Puree Solid Texture Trial   Mode of Presentation, Puree spoon;self-fed   Volume of Puree Presented x5 bites   Oral Phase, Puree WFL   Pharyngeal Phase, Puree intact   Diagnostic Statement No overt s/s of aspiration or dysphagia. Pt denied globus sensation.   Clinical Swallow Eval: Soft & Bite Sized   Mode of Presentation self-fed   Volume Presented x3 bites   Oral Phase effortful AP movement;residue in oral cavity   Oral Residue mid posterior tongue;hard palate;other (see comments)  (R buccal pocket)   Pharyngeal Phase intact   Successful Strategies Trialed During Procedure Other (see comments)  (liquid wash & lingual sweep)   Diagnostic Statement No overt s/s of aspiration. Pt with oral residue needing cues for lingual sweep and liquid wash to clear residue. Mild R pocketing. Prolonged mastication.   Clinical Swallow Evaluation: Solid Food Texture Trial   Mode of Presentation self-fed   Volume Presented x3 small pieces   Oral Phase impaired mastication;effortful AP movement;residue in oral cavity   Oral Residue hard palate;mid posterior tongue;other (see comments)  (R buccal pocket)   Pharyngeal Phase intact   Successful Strategies Trialed During Procedure other (see comments)  (liquid wash & lingual sweep)   Diagnostic Statement No overt s/s of aspiration. Pasted piece of bolus on roof of mouth and unchewed piece of cracker in R buccal cavity. Pt unaware needing SLP cues for lingual sweep and liquid wash. Needing multiple cues to clear.   Swallowing Recommendations   Diet Consistency Recommendations thin liquids (level 0);pureed (level 4)   Supervision Level for Intake close supervision needed   Mode of Delivery Recommendations bolus size, small;food moistened;slow rate of intake   Postural Recommendations none   Swallowing Maneuver Recommendations alternate food and liquid intake   Monitoring/Assistance Required (Eating/Swallowing) check mouth frequently for oral residue/pocketing;cue for  finger/lingual sweep if oral pocketing present;stop eating activities when fatigue is present;monitor for cough or change in vocal quality with intake   Recommended Feeding/Eating Techniques (Swallow Eval) maintain upright sitting position for eating;maintain upright posture during/after eating for 30 minutes;set-up and prepare tray;other (see comments)  (provide oral hygiene after meals)   Medication Administration Recommendations, Swallowing (SLP) as tolerated orally or via G-tube, per pt preference   Instrumental Assessment Recommendations instrumental evaluation not recommended at this time   General Therapy Interventions   Planned Therapy Interventions Dysphagia Treatment   Dysphagia treatment Modified diet education;Instruction of safe swallow strategies;Compensatory strategies for swallowing   Clinical Impression   Criteria for Skilled Therapeutic Interventions Met (SLP Eval) Yes, treatment indicated   SLP Diagnosis Acute on chronic oropharyngeal dysphagia   Risks & Benefits of therapy have been explained evaluation/treatment results reviewed;care plan/treatment goals reviewed;risks/benefits reviewed;current/potential barriers reviewed;participants included;patient;daughter;participants voiced agreement with care plan  (daughter via phone, after pt's verbal request & permission)   Clinical Impression Comments Clinical Swallow Evaluation completed per MD orders. Oral motor function was  notable for R facial weakness, which is chronic from pt's previous CVA (March) and pt edentulous with no dentures present. Via pt's daughter report via phone after session, which pt requested SLP to call and update, pt's lower dentures were lost during previous hospitalization. Pt manages well with upper dentures only. Patient's voice was WFL notable for minimally dysarthric, suspect d/t pt's dentition. Patient had no overt s/s aspiration with any texture trialed. Mastication was  prolonged and incomplete for chewable solids  with oral residue and small piece noted in pt's R buccal cavity . Pt's daughter reported that pt has chronic R buccal pocketing from CVA in March. Moderate  oral residue noted with chewable solids, which  mostly cleared with multiple cues for lingual sweep and liquid wash. Recommend pt initiates puree (IDDSI 4) diet and thin liquids with close supervision. Patient should be sitting fully upright and alert, take small bites/sips, slow rate, and alternate solids/liquids. Please check for oral pocketing and complete oral cares after meals. SLP will follow for dysphagia management and trialing advanced solids when pt's dentures are present.   SLP Total Evaluation Time   Eval: oral/pharyngeal swallow function, clinical swallow Minutes (10880) 15   Interventions   Interventions Quick Adds Swallowing Dysfunction   Swallowing Dysfunction &/or Oral Function for Feeding   Treatment of Swallowing Dysfunction &/or Oral Function for Feeding Minutes (35402) 10   Symptoms Noted During/After Treatment None   Treatment Detail/Skilled Intervention Clinical swallow evaluation completed. Trained pt re: rationale for evaluation, anatomy & physiology of swallowing, overt s/s of aspiration, possible aspiration-related complications, diet recommendations & rationale, and safe swallowing strategies. Pt voiced understanding and agreement. Trained pt on alternating solids/liquids, utilizing lingual sweep to check for R pocketing, and small bites/sips. Pt requested and provided verbal permission to have SLP call pt's daughter to provide update on swallowing fx plan and have her answer some additional questions. SLP called pt's daughter and educated on diet recommendations & rationale and safe swallowing strategies and POC. Pt's daughter voiced understanding.   SLP Discharge Planning   SLP Plan diet f/u, trial advanced solids, pt's daughter reported plan to bring pt's upper dentures on 7/17 in PM, abel swain   SLP Discharge Recommendation  home with assist   SLP Rationale for DC Rec Acute on chronic oropharyngeal dysphagia. Short course of dysphagia therapy. Suspect pt will meet dysphagia goals prior to d/c.   SLP Brief overview of current status  Recommend pt initiates puree (IDDSI 4) diet and thin liquids with close supervision. Patient should be sitting fully upright and alert, take small bites/sips, slow rate, and alternate solids/liquids. Please check for oral pocketing and complete oral cares after meals. SLP will follow for dysphagia management and trialing advanced solids when pt's dentures are present.

## 2024-07-17 NOTE — PROGRESS NOTES
CLINICAL NUTRITION SERVICES - ASSESSMENT NOTE     Nutrition Prescription    RECOMMENDATIONS FOR MDs/PROVIDERS TO ORDER:  Monitor/adjust insulin regimen PRN with initiation of TF's    Malnutrition Status:    Patient does not meet two of the established criteria necessary for diagnosing malnutrition    Recommendations already ordered by Registered Dietitian (RD):  Dosing wt: 69 kg   Formula: Jevity 1.5(or equivalent) with goal for 5 cartons daily  EN access: G-tube  Goal TF regimen: 5 cartons of Jevity 1.5 keeley (or equivalent) will provide: 1185 mL formula, 1778 Kcals (26 Kcal/kg), 76 gm protein (1.1 gm/kg), 256 gm CHO, 25 gm fiber, and 901 mL free water from TFs. Rec giving as 1 carton @ [id number] or per pt preference  Free water flushes:  60 mL before and after each bolus feed for FT patency     Initiation/advancement instructions:  - Begin 1st feeding with 237 mL (1 carton), infused via syringe  - If tolerated without GI complaint, continue   - Do not give feedings at night while pt asleep (during the day only).  - Maintain HOB 30-45 degrees with gastric EN access to reduce risk of aspiration     Each carton of Jevity 1.5 provides ~51 gm CHO.       Future/Additional Recommendations:  Monitor diet progression/adjust TF accordingly  Monitor TF tolerance/adequacy  Monitor nutrition related findings and follow up per protocol     REASON FOR ASSESSMENT  Roya Burgos is a/an 69 year old female assessed by the dietitian for Provider Order - concerns for malnutrition and Provider Order - Registered Dietitian to Assess and Order TF per Medical Nutrition Therapy Protocol    Patient admitted for change of mental status.     MEDICAL HISTORY   recent ischemic left middle cerebral artery stroke complicated by cerebral edema s/p decompressive hemicraniectomy complicated by hemorrhagic transformation secondary to therapeutic anticoagulation for atrial fibrillation old complicated by dysarthria and significant  "right-sided berhane-paralysis, oropharyngeal dysphagia s/p percutaneous gastrostomy tube on tube feeding, chronic migraine headache, paroxysmal atrial fibrillation on aspirin only given brain bleeding, chronic heart failure with mildly reduced ejection fraction, hypertension, type 2 diabetes mellitus, hyperlipidemia, chronic kidney disease stage III A, L parotid gland nodule, Bilateral moderate carotid stenosis, and COPD not on home oxygen.  The patient had frequent hospitalization in [facility] and was recently discharged on 7/14/2024 for care at home with home visiting nurse service.     NUTRITION HISTORY  Met with pt at bedside. Pt unable to provide significant hx. No family available at bedside. Pt denies N/V/D/C currently. Unable to recall details of TF but states she has not had any known issues with them. States she had tube feeding earlier today but also that she hasn't received any since early yesterday. Pt reports wt loss but is unsure of amount. Pt does not believe she has lost any muscle. Denies food allergies/intolerances. Discussed restarting TF. Pt states she has been getting boluses rather than continuous feeds. Pt denied further nutrition related questions/concerns at this time.     Per OSH note on 7/14,  Isosource 1.5 Lorenzo  Administer 5 Cans into feeding tube daily for 30 days. OK to Substitute Equivalent Formula  If eats 25% or less give 1.5 can after meal.  If eats 25-50% of meal give 1 can. If consumes >75% of meal no tube feeding.  Give 1/2 can daily in evening.  CURRENT NUTRITION ORDERS  Diet: NPO    Intake/Tolerance: No documented intakes to assess.    LABS  BUN 28.9  Cre 1.50  GFR 38  CRP 30.50  Glu 114  A1C 7.4  Hgb 11      MEDICATIONS  Insulin      ANTHROPOMETRICS  Height: 165.1 cm (5' 5\")  Most Recent Weight: 107.5 kg (237 lb)    IBW: 56.8 kg  Body mass index is 39.44 kg/m . BMI Category: Obesity Grade II BMI 35-39.9  Weight History: Wt fluctuates but overall stable   Wt " Readings from Last 20 Encounters:   07/16/24 107.5 kg (237 lb)   09/08/14 101.5 kg (223 lb 12.8 oz)     Per care everywhere,  96.5 kg on 6/2624  109.4 kg on 5/24/24  106.1 kg on 4/17/24  108 kg on 8/22/23  ASSESSED NUTRITION NEEDS  Dosing Weight: 69 kg (Adjusted BW)   Estimated Energy Needs: 4679-8807 kcals/day (25 - 30 kcals/kg)  Justification: Maintenance  Estimated Protein Needs: 69-83 grams protein/day (1 - 1.2 grams of pro/kg)  Justification: Maintenance and CKD  Estimated Fluid Needs: (1 mL/kcal)   Justification: Maintenance    PHYSICAL FINDINGS  See malnutrition section below.    Hong Score: 13  Per EMR: Skin  Skin WDL: .WDL except, integrity  Skin Integrity: other (see comments)  Other (see comments): PEG tube site  Device Skin Interventions Taken: No adjustments needed    MALNUTRITION  % Intake:  TF dependent  % Weight Loss: Weight loss does not meet criteria for malnutrition   Subcutaneous Fat Loss: None observed   Muscle Loss: Temporal:  mild and Posterior calf:  mild  Fluid Accumulation/Edema: None noted  Malnutrition Diagnosis: Patient does not meet two of the established criteria necessary for diagnosing malnutrition    NUTRITION DIAGNOSIS  Inadequate oral intake related to dysphagia as evidenced by TF to help meet nutritional needs.      INTERVENTIONS  Implementation  Nutrition Education: will be provided if nutrition education needs arise.  and Nutrition Education: Introduced role of RD   Enteral Nutrition - Initiate  Feeding tube flush     Goals  Total avg nutritional intake to meet a minimum of 25 kcal/kg and 1 g PRO/kg daily (per dosing wt 69 kg).        Monitoring/Evaluation  Progress toward goals will be monitored and evaluated per protocol.  Lucy Henriquez MS, RD, LD, CNSC    6C (beds 9244-1368) + 7C (beds 5803-4323) + ED   Available in [facility] by name or unit dietitian

## 2024-07-17 NOTE — PLAN OF CARE
Goal Outcome Evaluation:  Neuro: A&Ox4. Garbled speech. Has right sided weakness from previous stroke  Cardiac: -140's. Called rapid response d/t due to Afib with RVR . EKG done. Started on her home oral diltiazem and digoxin    Respiratory: RA.  GI/: Incontinent of urine and stool  Diet/appetite: Pureed diet   Activity: Turns and repositions with 2 assist   Pain: Gave tylenol for headache pain   Skin: No new deficits noted.  LDA's: L PIV SL    Gave 3 cans of TF bolus with 60ml of water flushes before and after    Plan: Continue with POC. Notify primary team with changes.

## 2024-07-17 NOTE — PROGRESS NOTES
"Care Management Follow Up    Length of Stay (days): 0    Expected Discharge Date: 07/17/2024     Concerns to be Addressed:     Placement - long-term vs Obs Status  Patient plan of care discussed at interdisciplinary rounds: No    Anticipated Discharge Disposition:  SNF     Anticipated Discharge Services:  SNF  Anticipated Discharge DME: Not assessed    Patient/family educated on Medicare website which has current facility and service quality ratings:  Yes  Education Provided on the Discharge Plan:  Yes  Patient/Family in Agreement with the Plan:  N/A    Referrals Placed by CM/SW:  None at this time  Private pay costs discussed: Not applicable    Additional Information:  long-term care consult acknowledged, however, per discussion with ED MD and admitting provider, there may be some further discussion or rule out on whether or not pt is a long-term care or obs pt. Due to the hour, writer will need to defer long-term care admission discussion to daytime team.    Writer met with pt in the ED hallway until she was taken to x-ray. Pt reports that she has not been doing well at home the last two days with her daughter and grandson taking care of her. Pt reports that she want to go \"Just anywhere\" and just does not want to go back to [facility] due to a bad experience there.    Writer spoke with pt's daughter, Miles (P: [phone]) over the phone. Dtr reports that she is struggling and wants pt to go to a LTC facility, so long as it is not [facility], citing poor care. Dtr is aware of challenges regarding pt being placed and she reported she would look at the medicare.gov list of facilities and have 15-20 referrals by the time SW reaches out to her tomorrow. Dtr reports that she wanted referrals sent to the facilities listed below.     Pt is on [place] and will have coverage for LTC.    [facility] [address]  [address] [place]  [address]  P: [phone]  F: [phone]    [facility]  [address] [place] [address]  P: [phone]  P: [phone] - Admissions   F: [phone]    [facility]  [address] [place] [address]  [place]  [address]  P: [phone]  P: [phone]  F: [phone]    [facility]  [address]  P: [phone]  F: [phone]    [facility]  [address] [place]  [address]  P: [phone]  P: [phone] - Admissions  F: [phone]    ________________    LUBA Collins, [facility]  ED/Observation   [facility]  Phone: [phone]  Fax: [phone]    After hours Vocera [place] and After Hours Vocera [employer]  Available from 4:00pm - Midnight

## 2024-07-18 ENCOUNTER — APPOINTMENT (OUTPATIENT)
Dept: SPEECH THERAPY | Facility: CLINIC | Age: 69
DRG: 948 | End: 2024-07-18
Payer: COMMERCIAL

## 2024-07-18 LAB
ALBUMIN SERPL BCG-MCNC: 3.3 G/DL (ref 3.5–5.2)
ALP SERPL-CCNC: 92 U/L (ref 40–150)
ALT SERPL W P-5'-P-CCNC: 10 U/L (ref 0–50)
AMMONIA PLAS-SCNC: 20 UMOL/L (ref 11–51)
ANION GAP SERPL CALCULATED.3IONS-SCNC: 10 MMOL/L (ref 7–15)
AST SERPL W P-5'-P-CCNC: 26 U/L (ref 0–45)
BACTERIA UR CULT: NORMAL
BASE EXCESS BLDV CALC-SCNC: 3.8 MMOL/L (ref -3–3)
BASOPHILS # BLD AUTO: 0 10E3/UL (ref 0–0.2)
BASOPHILS NFR BLD AUTO: 0 %
BILIRUB SERPL-MCNC: 0.4 MG/DL
BUN SERPL-MCNC: 17.5 MG/DL (ref 8–23)
CALCIUM SERPL-MCNC: 9.6 MG/DL (ref 8.8–10.4)
CHLORIDE SERPL-SCNC: 104 MMOL/L (ref 98–107)
CREAT SERPL-MCNC: 1.08 MG/DL (ref 0.51–0.95)
CRP SERPL-MCNC: 28.1 MG/L
EGFRCR SERPLBLD CKD-EPI 2021: 56 ML/MIN/1.73M2
EOSINOPHIL # BLD AUTO: 0.1 10E3/UL (ref 0–0.7)
EOSINOPHIL NFR BLD AUTO: 1 %
ERYTHROCYTE [DISTWIDTH] IN BLOOD BY AUTOMATED COUNT: 16 % (ref 10–15)
GLUCOSE BLDC GLUCOMTR-MCNC: 130 MG/DL (ref 70–99)
GLUCOSE BLDC GLUCOMTR-MCNC: 155 MG/DL (ref 70–99)
GLUCOSE BLDC GLUCOMTR-MCNC: 173 MG/DL (ref 70–99)
GLUCOSE BLDC GLUCOMTR-MCNC: 224 MG/DL (ref 70–99)
GLUCOSE SERPL-MCNC: 135 MG/DL (ref 70–99)
HCO3 BLDV-SCNC: 30 MMOL/L (ref 21–28)
HCO3 SERPL-SCNC: 25 MMOL/L (ref 22–29)
HCT VFR BLD AUTO: 36.8 % (ref 35–47)
HGB BLD-MCNC: 11.1 G/DL (ref 11.7–15.7)
IMM GRANULOCYTES # BLD: 0 10E3/UL
IMM GRANULOCYTES NFR BLD: 1 %
LACTATE SERPL-SCNC: 1.9 MMOL/L (ref 0.7–2)
LYMPHOCYTES # BLD AUTO: 1.9 10E3/UL (ref 0.8–5.3)
LYMPHOCYTES NFR BLD AUTO: 22 %
MCH RBC QN AUTO: 26.4 PG (ref 26.5–33)
MCHC RBC AUTO-ENTMCNC: 30.2 G/DL (ref 31.5–36.5)
MCV RBC AUTO: 87 FL (ref 78–100)
MONOCYTES # BLD AUTO: 1.2 10E3/UL (ref 0–1.3)
MONOCYTES NFR BLD AUTO: 13 %
NEUTROPHILS # BLD AUTO: 5.5 10E3/UL (ref 1.6–8.3)
NEUTROPHILS NFR BLD AUTO: 63 %
NRBC # BLD AUTO: 0 10E3/UL
NRBC BLD AUTO-RTO: 0 /100
O2/TOTAL GAS SETTING VFR VENT: 20 %
OXYHGB MFR BLDV: 72 % (ref 70–75)
PCO2 BLDV: 49 MM HG (ref 40–50)
PH BLDV: 7.39 [PH] (ref 7.32–7.43)
PLATELET # BLD AUTO: 356 10E3/UL (ref 150–450)
PO2 BLDV: 42 MM HG (ref 25–47)
POTASSIUM SERPL-SCNC: 4.3 MMOL/L (ref 3.4–5.3)
PROT SERPL-MCNC: 7.7 G/DL (ref 6.4–8.3)
RBC # BLD AUTO: 4.21 10E6/UL (ref 3.8–5.2)
SAO2 % BLDV: 73.5 % (ref 70–75)
SODIUM SERPL-SCNC: 139 MMOL/L (ref 135–145)
WBC # BLD AUTO: 8.7 10E3/UL (ref 4–11)

## 2024-07-18 PROCEDURE — 82805 BLOOD GASES W/O2 SATURATION: CPT | Performed by: STUDENT IN AN ORGANIZED HEALTH CARE EDUCATION/TRAINING PROGRAM

## 2024-07-18 PROCEDURE — 86140 C-REACTIVE PROTEIN: CPT | Performed by: STUDENT IN AN ORGANIZED HEALTH CARE EDUCATION/TRAINING PROGRAM

## 2024-07-18 PROCEDURE — 250N000013 HC RX MED GY IP 250 OP 250 PS 637: Performed by: STUDENT IN AN ORGANIZED HEALTH CARE EDUCATION/TRAINING PROGRAM

## 2024-07-18 PROCEDURE — 82140 ASSAY OF AMMONIA: CPT | Performed by: STUDENT IN AN ORGANIZED HEALTH CARE EDUCATION/TRAINING PROGRAM

## 2024-07-18 PROCEDURE — 83605 ASSAY OF LACTIC ACID: CPT | Performed by: STUDENT IN AN ORGANIZED HEALTH CARE EDUCATION/TRAINING PROGRAM

## 2024-07-18 PROCEDURE — 85041 AUTOMATED RBC COUNT: CPT | Performed by: INTERNAL MEDICINE

## 2024-07-18 PROCEDURE — 87040 BLOOD CULTURE FOR BACTERIA: CPT | Performed by: STUDENT IN AN ORGANIZED HEALTH CARE EDUCATION/TRAINING PROGRAM

## 2024-07-18 PROCEDURE — 92526 ORAL FUNCTION THERAPY: CPT | Mod: GN

## 2024-07-18 PROCEDURE — 99232 SBSQ HOSP IP/OBS MODERATE 35: CPT | Performed by: STUDENT IN AN ORGANIZED HEALTH CARE EDUCATION/TRAINING PROGRAM

## 2024-07-18 PROCEDURE — 80053 COMPREHEN METABOLIC PANEL: CPT | Performed by: STUDENT IN AN ORGANIZED HEALTH CARE EDUCATION/TRAINING PROGRAM

## 2024-07-18 PROCEDURE — 36415 COLL VENOUS BLD VENIPUNCTURE: CPT | Performed by: STUDENT IN AN ORGANIZED HEALTH CARE EDUCATION/TRAINING PROGRAM

## 2024-07-18 PROCEDURE — 120N000002 HC R&B MED SURG/OB UMMC

## 2024-07-18 RX ADMIN — METOPROLOL TARTRATE 100 MG: 50 TABLET, FILM COATED ORAL at 20:24

## 2024-07-18 RX ADMIN — METOPROLOL TARTRATE 100 MG: 50 TABLET, FILM COATED ORAL at 08:12

## 2024-07-18 RX ADMIN — Medication 15 ML: at 08:12

## 2024-07-18 RX ADMIN — INSULIN GLARGINE 8 UNITS: 100 INJECTION, SOLUTION SUBCUTANEOUS at 22:19

## 2024-07-18 RX ADMIN — DILTIAZEM HYDROCHLORIDE 60 MG: 60 TABLET, FILM COATED ORAL at 14:29

## 2024-07-18 RX ADMIN — ATORVASTATIN CALCIUM 40 MG: 40 TABLET, FILM COATED ORAL at 20:24

## 2024-07-18 RX ADMIN — ACETAMINOPHEN 650 MG: 325 TABLET, FILM COATED ORAL at 15:50

## 2024-07-18 RX ADMIN — DIGOXIN 125 MCG: 125 TABLET ORAL at 08:12

## 2024-07-18 RX ADMIN — DILTIAZEM HYDROCHLORIDE 60 MG: 60 TABLET, FILM COATED ORAL at 22:09

## 2024-07-18 RX ADMIN — EMPAGLIFLOZIN 10 MG: 10 TABLET, FILM COATED ORAL at 08:12

## 2024-07-18 RX ADMIN — ACETAMINOPHEN 650 MG: 325 TABLET, FILM COATED ORAL at 20:25

## 2024-07-18 ASSESSMENT — ACTIVITIES OF DAILY LIVING (ADL)
ADLS_ACUITY_SCORE: 60
ADLS_ACUITY_SCORE: 61
ADLS_ACUITY_SCORE: 61
ADLS_ACUITY_SCORE: 59
ADLS_ACUITY_SCORE: 61
ADLS_ACUITY_SCORE: 62
ADLS_ACUITY_SCORE: 61
ADLS_ACUITY_SCORE: 59
ADLS_ACUITY_SCORE: 61
ADLS_ACUITY_SCORE: 59
ADLS_ACUITY_SCORE: 60
ADLS_ACUITY_SCORE: 61
ADLS_ACUITY_SCORE: 59
ADLS_ACUITY_SCORE: 59
ADLS_ACUITY_SCORE: 61

## 2024-07-18 NOTE — PROGRESS NOTES
Care Management Follow Up    Length of Stay (days): 2    Expected Discharge Date: 07/18/2024     Concerns to be Addressed:       Patient plan of care discussed at interdisciplinary rounds: Yes    Anticipated Discharge Disposition:       Anticipated Discharge Services:    Anticipated Discharge DME:      Patient/family educated on Medicare website which has current facility and service quality ratings:    Education Provided on the Discharge Plan:    Patient/Family in Agreement with the Plan:      Referrals Placed by CM/SW:    Private pay costs discussed: Not applicable    Additional Information:    Pending LTC Referrals:  [place], [place]  # [phone]  7/18: facility will review     [facility], [place], [place]  # [phone]   7/18: facility will review    Declined LTC Referrals:   [place]  # [phone]  Declined: No LTC beds available     [place], [place]  # [phone]  Declined: Bed not available     [facility], [place], [place]  # [phone]  Declined: Bed not available    [place], [place]  # [phone]  Declined: No LTC beds available      [facility], [place], [place]  # [phone]  Declined: Acuity too high    Maryjustin Abbasia  Inpatient W  [facility] [address]  [phone]

## 2024-07-18 NOTE — PLAN OF CARE
Goal Outcome Evaluation:      Plan of Care Reviewed With: patient    Overall Patient Progress: no change Overall Patient Progress: no change     Pt appears to be more solemn than compared to what was given during report. Pt is able to follow one simple command: squeezing fingers. Pt is responsive to painful stimulis. Team Gold paged, and provider, Chey Rodriguez, came to see pt. Per provider's recommendation to hold AM med. Provider also wanted all labs to be drawn. He is aware of pt's refusal status before.     Saw lab after the initial draw, and  reported that pt is more alert and awake now. Pt allowed  to draw blood for lab.

## 2024-07-18 NOTE — PROGRESS NOTES
Physician Attestation   I, Meena Price MD, was present with the medical/ENMANUEL student who participated in the service and in the documentation of the note.  I have verified the history and personally performed the physical exam and medical decision making.  I agree with the assessment and plan of care as documented in the note.      Key findings: Pt work up thus far unrevealing, she has returned to her baseline mental status which daughter also concurs with. Working on LTC placement.     Please see A&P for additional details of medical decision making.    I have personally reviewed the following data over the past 24 hrs:    8.7  \   11.1 (L)   / 356     139 104 17.5 /  224 (H)   4.3 25 1.08 (H) \     ALT: 10 AST: 26 AP: 92 TBILI: 0.4   ALB: 3.3 (L) TOT PROTEIN: 7.7 LIPASE: N/A     Procal: N/A CRP: 28.10 (H) Lactic Acid: 1.9           Meena Price MD  Date of Service (when I saw the patient): 07/18/24    [facility]    Progress Note - Hospitalist Service, GOLD TEAM 10       Date of Admission:  7/16/2024    Assessment & Plan   Roya Burgos is a 69 year old female admitted on 7/16/2024 for evaluation of AMS. She has PMHx of recent ischemic left MCA stroke c/b cerebral edema s/p decompressive hemicraniectomy c/b hemorrhagic transformation 2/2 therapeutic anticoagulation for atrial fibrillation old complicated by dysarthria and significant right-sided berhane-paralysis, oropharyngeal dysphagia s/p percutaneous gastrostomy tube on tube feeding, chronic migraine headache, paroxysmal atrial fibrillation on aspirin only given brain bleeding, chronic heart failure with mildly reduced ejection fraction, hypertension, type 2 diabetes mellitus, hyperlipidemia, chronic kidney disease stage III A, L parotid gland nodule, Bilateral moderate carotid stenosis, and COPD not on home oxygen.  The patient had frequent hospitalization in [facility] and was recently discharged on  7/14/2024 for care at home with home visiting nurse service, but returned as daughter was unable to care for her at home. Hemodynamically stable and clinically improving.    Changes today 7/18:  - PT/OT consulted.  - Changed diet to minced and moist diet IDDSI 5 and thin liquids with continued supervision, per SPL.  - Updated family on patient's status, will be medically stable likely on 7/19  - Likely discharge to long-term care facility, appreciate SW involvement/coordinators    AMS, resolved  Reported to be agitated and not herself. Labs obtained on admission thus far unrevealing (CXR negative, UA neg, digoxin level wnl, CT head neg). VBG w/ no hypercarbia, LA mildly elevated at 2.1, now 1.9 (7/18). Cdiff and enteric panel neg. Covid neg. CRP of 31 on admission and downtrending to 28 on 7/18. Ammonia wnl. Blood cultures pending. On our interview on 7/17, pt is conversant and does not appear altered. It appears that she has returned back to baseline.  -Holding off on antibiotics given lack of evidence of sepsis and lack of clear infectious source  -Neurochecks every 4 hours for the first 48 hours of admission  -Requested a pharmacy consult to review etiologies for acute toxic encephalopathy.              - No changes needed after review of meds.             Recent ischemic left MCA stroke c/b cerebral edema s/p decompressive hemicraniectomy c/b hemorrhagic transformation secondary to therapeutic anticoagulation  Dysarthria   Significant right-sided berhane-paralysis  oropharyngeal dysphagia s/p percutaneous gastrostomy tube on tube feeding  -Will resume aspirin once we ensure there is no active brain bleeding- pt reports not taking, will verify  -Requested dietitian consult to resume tube feeding.  - SPL consulted.              - Initiate pureed (IDDSI 4) diet and thin liquids (7/17) and advanced to IDDSI 5 (7/18) with thin liquids.              - SLP will follow for dysphagia management and trialing advanced solids  when pt's dentures are present.  -PTA Atorvastatin.  -The patient will need neurosurgery follow-up in 6 weeks based on documentation from prior hospital     Acute kidney injury, akin stage I on top of CKD stage III, resolved   Cr baseline 1-1.5 (2024) and Cr 1.5 on admission. BUN/Cr ~19, likely prerenal. Now improved on 7/18 with Cr 1.08 and BUN 17.5.  -Strict intake and output and daily body weight  -Hold PTA losartan > plan to continue on 7/19 if Cr remains stable      Afib  Digoxin levels wnl. Pt w/ afib w/ RVR earlier on 7/17 w/out home meds ordered, otherwise HDS. Will resume home meds  - PTA metoprolol tartrate 100 mg BID, diltiazem 60 mgs Q8h, digoxin 125mcg Daily   - telemetry  - pt not on AC 2/2 hemorrhagic stroke hx     Chronic heart failure with mildly reduced ejection fraction LVEF 46%TTE from an outside hospital (6/6/24):  1. Limited echo.  2. Mild LV enlargement with mildly increased wall thickness. Calculated biplane LVEF 46%. Mild global hypokinesis. No LV thrombus.  3. Mild RV enlargement with mildly decreased systolic function. Estimated PASP 25 mmHg + RA Pressure.   -Consider switching metoprolol tartrate to metoprolol succinate prior to discharge for HF   -Consider switching losartan to valsartan once acute kidney injury resolves then eventually initiation of Entresto  - resume PTA empagliflozin     T2DM, Uncontrolled (7/15/2024 @ 7.4)  Hemoglobin A1c at [facility] was 9.2 on 4/4/2024. Repeat on 7/15 7.4   - resume PTA empagloflozin, lantus 8 units  -Started insulin NovoLog coverage  -Will hold off on metformin in setting of acute kidney injury  -Holding PTA gabapentin 300 mg 3 times daily due to ?sedation on 7/18 AM      Uncontrolled hypertension  - holding losartan currently due to ALDO  - resume PTA metoprolol as above      Migraine headache  - hold PTA amitriptyline, noted to be sedated 7/18 AM   - tylenol PRN     COPD not on home oxygen  Breathing comfortably on RA  - resume PTA inhaler  "(fluticasone vilanterol)  - Bill FARRELL        Need for placement into a transitional care unit versus long-term care  Daughter Miles on phone and discussed that she is agreeable for placement for patient as she was unable to care for her at home due to her medical needs.   -Case management/social work were consulted in the emergency department               Diet: Adult Formula Bolus Feeding: Jevity 1.5; Route: Gastrostomy; 5 times daily; Volume per Bolus: 1; Can(s)/ Carton(s); Rec giving as 1 carton @ [id number] or per pt preference  Minced & Moist Diet (level 5) Thin Liquids (level 0) (1:1 supervision while on thin liquids)    DVT Prophylaxis: Pneumatic Compression Devices  Rhodes Catheter: Not present  Fluids: None  Lines: None     Cardiac Monitoring: ACTIVE order. Indication: QTc prolonging medication (48 hours)  Code Status: Full Code      Clinically Significant Risk Factors           # Hypercalcemia: corrected calcium is >10.1, will monitor as appropriate    # Hypoalbuminemia: Lowest albumin = 3.3 g/dL at 7/18/2024  6:46 AM, will monitor as appropriate     # Hypertension: Noted on problem list             # DMII: A1C = 7.4 % (Ref range: <5.7 %) within past 6 months, PRESENT ON ADMISSION  # Obesity: Estimated body mass index is 39.44 kg/m  as calculated from the following:    Height as of this encounter: 1.651 m (5' 5\").    Weight as of this encounter: 107.5 kg (237 lb)., PRESENT ON ADMISSION            Disposition Plan     Expected Discharge Date: 07/18/2024                The patient's care was discussed with the Attending Physician, Dr. Meena Price .    Glen Treadwell  Medical Student  Hospitalist Service, [address]  Securely message with [employer] (more info)  Text page via [employer] Paging/Directory   See signed in provider for up to date coverage " information  ______________________________________________________________________    Interval History   No acute events overnight. RRT called on 7/17 PM due to Afib with RVR, which resolved after starting PTA diltiazem and digoxin. Patient reports feeling good. Wondering when she can go home. Daughter was present along with friend and received updates on patient's status. Patient denies any chest pain, shortness of breath, palpitations. Feels that migraine headache is much improved compared to admission. Has been voiding and stooling appropriately without any complaints.    Physical Exam   Vital Signs: Temp: 97.8  F (36.6  C) Temp src: Axillary BP: (!) 147/97 Pulse: 96   Resp: 16 SpO2: 96 % O2 Device: None (Room air)    Weight: 237 lbs 0 oz    Constitutional: awake, alert, cooperative, no apparent distress, lying down on hospital bed in the hallway.  Eyes: extra ocular muscles intact, sclera clear.  Respiratory: No increased work of breathing on room air, good air exchange, clear to auscultation bilaterally, no crackles or wheezing  Cardiovascular: RRR, normal S1 and S2, did not appreciate any murmur, rubs or gallops.  GI: normal bowel sounds, soft, non-distended, non-tender, PEG in place, c/d/I.  Skin: Warm and dry. No lower extremity pitting edema. No suspicious rash on exposed skin.  Musculoskeletal: Right-sided hemiparesis.  Neurologic: Awake, alert, oriented to name, place and time.    Medical Decision Making       Please see A&P for additional details of medical decision making.      Data     I have personally reviewed the following data over the past 24 hrs:    8.7  \   11.1 (L)   / 356     139 104 17.5 /  224 (H)   4.3 25 1.08 (H) \     ALT: 10 AST: 26 AP: 92 TBILI: 0.4   ALB: 3.3 (L) TOT PROTEIN: 7.7 LIPASE: N/A     Procal: N/A CRP: 28.10 (H) Lactic Acid: 1.9         Imaging results reviewed over the past 24 hrs:   No results found for this or any previous visit (from the past 24 hour(s)).

## 2024-07-18 NOTE — PLAN OF CARE
"Assumed cares [id number]     BP (!) 147/97 (BP Location: Right arm)   Pulse 96   Temp 97.4  F (36.3  C) (Oral)   Resp 16   Ht 1.651 m (5' 5\")   Wt 107.5 kg (237 lb)   LMP  (LMP Unknown)   SpO2 96%   BMI 39.44 kg/m       Pain: Patient had a headache. Given PRN tylenol.   Neuro: A&Ox4. Garbled speech at times.    Respiratory: Lungs clear and equal, on RA.   Cardiac/Neurovascular: HR and pulse irregular. Tele afib (baseline). No numbness/tingling reported.   GI/: Incontinent of urine/stool. Pure wick in place.   Nutrition: Bolus feeds throughout day. Level 5 pureed diet with supervision.   Activity: Bed bound. Complete right sided weakness.   Skin: No concerns.   Lines: PIV L arm (SL). PEG tube (clamped).  Events this shift: PT/OT consulted today. Will be med ready tomorrow. Pending LTC placement.      Plan: Continue with plan of care.     " 24-Oct-2022 16:28

## 2024-07-18 NOTE — PLAN OF CARE
[id number]  Goal Outcome Evaluation:      Plan of Care Reviewed With: patient    Overall Patient Progress: no change    Outcome Evaluation: Patient is not fully compliant with cares. Patient is refusing lab draws, and not receptive to teachings. Provider updated.   Patient remians A&Ox4, garbled speech. Baseline right sided weakness from previous stroke. Chronic afib - rate controlled thus far tonight. on RA and maintains SPO2  >96%. Patient transferred to , handoff given to Willa BAILON RN.

## 2024-07-18 NOTE — SUMMARY OF CARE
(Change note type to summary of care)  Transferred from:  Obs  Report received from:     Pepe RN      2 RN skin assessment completed by: Willa RN and Zuly RN      - Findings (add LDA if needed): PEG tube  Care plan (primary problem) and education initiated if not already done: Yes to care plan; NO to education  MDRO education done if applicable: N/A  Pt informed about policy regarding no IV pumps off unit: No  Suction set up in room? Yes  Flu shot ordered? (October-April only): N/A  Detailed Belongings: Pt cannot stay awake to answer admission questions. Looked through her belongings and found cellphone, , helmet, and bag of clothing.

## 2024-07-18 NOTE — PROGRESS NOTES
Care Management Follow Up    Length of Stay (days): 2    Expected Discharge Date: 07/18/2024     Concerns to be Addressed:    discharge planning   Patient plan of care discussed at interdisciplinary rounds: Yes    Anticipated Discharge Disposition:  LTC     Anticipated Discharge Services:  I/ADL assistance at LTC   Anticipated Discharge DME:  None    Patient/family educated on Medicare website which has current facility and service quality ratings:  Yes  Education Provided on the Discharge Plan:  Yes  Patient/Family in Agreement with the Plan:  Yes    Referrals Placed by CM/SW:      Pending:   [place]  # [phone]     [place], [place]  # [phone]     [place], [place]  # [phone]     [facility], [place], [place]  # [phone]     [facility], [place], [place]  # [phone]     [facility], [place], [place]  # [phone]     [place], [place]  # [phone]    Private pay costs discussed: Not applicable    Additional Information:  SW following for discharge planning. Per chart review, pt needing LTC as pt's daughter and grandson are no longer able to accommodate pt's care needs at home. Per [employer], anticipating pt will be med ready tomorrow. JOS requested CHW (Mary) to follow up on LTC referrals. Please see Mary's note for more details. SW to continue to follow for discharge planning.    ADDENDUM 1603: JOS spoke with pt's [facility] Care Coordinator (Tanya) requesting update on discharge plan. JOS shared that SW team looking for LTC placement. Tanya reported that she was working with pt and family to open pt to Elderly Waiver and notes that eventually pt will be eligible for senior living with EW coverage. Tanya reported that pt was only receiving PCA services at this time while she was working to open EW. Tanya requests that SW keep her updated on discharge plan.     [facility]  Coordinator  Tanya Taylor    Ph: [phone]    Litzy Zaman [facility]  Teri   formerly [facility]   Available via Vocera  Covering 7C SW, ph: [phone]

## 2024-07-19 ENCOUNTER — APPOINTMENT (OUTPATIENT)
Dept: SPEECH THERAPY | Facility: CLINIC | Age: 69
DRG: 948 | End: 2024-07-19
Payer: COMMERCIAL

## 2024-07-19 LAB
ANION GAP SERPL CALCULATED.3IONS-SCNC: 12 MMOL/L (ref 7–15)
BUN SERPL-MCNC: 15.6 MG/DL (ref 8–23)
CALCIUM SERPL-MCNC: 9.4 MG/DL (ref 8.8–10.4)
CHLORIDE SERPL-SCNC: 102 MMOL/L (ref 98–107)
CREAT SERPL-MCNC: 1.04 MG/DL (ref 0.51–0.95)
EGFRCR SERPLBLD CKD-EPI 2021: 59 ML/MIN/1.73M2
ERYTHROCYTE [DISTWIDTH] IN BLOOD BY AUTOMATED COUNT: 16 % (ref 10–15)
GLUCOSE BLDC GLUCOMTR-MCNC: 125 MG/DL (ref 70–99)
GLUCOSE BLDC GLUCOMTR-MCNC: 189 MG/DL (ref 70–99)
GLUCOSE BLDC GLUCOMTR-MCNC: 190 MG/DL (ref 70–99)
GLUCOSE BLDC GLUCOMTR-MCNC: 218 MG/DL (ref 70–99)
GLUCOSE BLDC GLUCOMTR-MCNC: 218 MG/DL (ref 70–99)
GLUCOSE BLDC GLUCOMTR-MCNC: 269 MG/DL (ref 70–99)
GLUCOSE SERPL-MCNC: 268 MG/DL (ref 70–99)
HCO3 SERPL-SCNC: 23 MMOL/L (ref 22–29)
HCT VFR BLD AUTO: 36.7 % (ref 35–47)
HGB BLD-MCNC: 10.9 G/DL (ref 11.7–15.7)
MCH RBC QN AUTO: 26.4 PG (ref 26.5–33)
MCHC RBC AUTO-ENTMCNC: 29.7 G/DL (ref 31.5–36.5)
MCV RBC AUTO: 89 FL (ref 78–100)
PLATELET # BLD AUTO: 340 10E3/UL (ref 150–450)
POTASSIUM SERPL-SCNC: 4.3 MMOL/L (ref 3.4–5.3)
RBC # BLD AUTO: 4.13 10E6/UL (ref 3.8–5.2)
SODIUM SERPL-SCNC: 137 MMOL/L (ref 135–145)
WBC # BLD AUTO: 10.4 10E3/UL (ref 4–11)

## 2024-07-19 PROCEDURE — 250N000013 HC RX MED GY IP 250 OP 250 PS 637: Performed by: STUDENT IN AN ORGANIZED HEALTH CARE EDUCATION/TRAINING PROGRAM

## 2024-07-19 PROCEDURE — 84295 ASSAY OF SERUM SODIUM: CPT | Performed by: STUDENT IN AN ORGANIZED HEALTH CARE EDUCATION/TRAINING PROGRAM

## 2024-07-19 PROCEDURE — 92526 ORAL FUNCTION THERAPY: CPT | Mod: GN

## 2024-07-19 PROCEDURE — 36415 COLL VENOUS BLD VENIPUNCTURE: CPT | Performed by: STUDENT IN AN ORGANIZED HEALTH CARE EDUCATION/TRAINING PROGRAM

## 2024-07-19 PROCEDURE — 99232 SBSQ HOSP IP/OBS MODERATE 35: CPT | Performed by: STUDENT IN AN ORGANIZED HEALTH CARE EDUCATION/TRAINING PROGRAM

## 2024-07-19 PROCEDURE — 93005 ELECTROCARDIOGRAM TRACING: CPT

## 2024-07-19 PROCEDURE — 85027 COMPLETE CBC AUTOMATED: CPT | Performed by: STUDENT IN AN ORGANIZED HEALTH CARE EDUCATION/TRAINING PROGRAM

## 2024-07-19 PROCEDURE — 93010 ELECTROCARDIOGRAM REPORT: CPT | Performed by: INTERNAL MEDICINE

## 2024-07-19 PROCEDURE — 120N000002 HC R&B MED SURG/OB UMMC

## 2024-07-19 PROCEDURE — 82374 ASSAY BLOOD CARBON DIOXIDE: CPT | Performed by: STUDENT IN AN ORGANIZED HEALTH CARE EDUCATION/TRAINING PROGRAM

## 2024-07-19 RX ORDER — AMITRIPTYLINE HYDROCHLORIDE 10 MG/1
10 TABLET ORAL ONCE
Status: COMPLETED | OUTPATIENT
Start: 2024-07-19 | End: 2024-07-19

## 2024-07-19 RX ORDER — LOSARTAN POTASSIUM 50 MG/1
50 TABLET ORAL DAILY
Status: DISCONTINUED | OUTPATIENT
Start: 2024-07-19 | End: 2024-07-24 | Stop reason: HOSPADM

## 2024-07-19 RX ORDER — AMITRIPTYLINE HYDROCHLORIDE 10 MG/1
10 TABLET ORAL AT BEDTIME
Status: DISCONTINUED | OUTPATIENT
Start: 2024-07-20 | End: 2024-07-24 | Stop reason: HOSPADM

## 2024-07-19 RX ADMIN — Medication 15 ML: at 08:20

## 2024-07-19 RX ADMIN — DILTIAZEM HYDROCHLORIDE 60 MG: 60 TABLET, FILM COATED ORAL at 22:04

## 2024-07-19 RX ADMIN — EMPAGLIFLOZIN 10 MG: 10 TABLET, FILM COATED ORAL at 08:20

## 2024-07-19 RX ADMIN — ACETAMINOPHEN 650 MG: 325 TABLET, FILM COATED ORAL at 04:59

## 2024-07-19 RX ADMIN — AMITRIPTYLINE HYDROCHLORIDE 10 MG: 10 TABLET, FILM COATED ORAL at 16:16

## 2024-07-19 RX ADMIN — LOSARTAN POTASSIUM 50 MG: 50 TABLET, FILM COATED ORAL at 14:14

## 2024-07-19 RX ADMIN — METOPROLOL TARTRATE 100 MG: 50 TABLET, FILM COATED ORAL at 08:20

## 2024-07-19 RX ADMIN — INSULIN GLARGINE 8 UNITS: 100 INJECTION, SOLUTION SUBCUTANEOUS at 22:07

## 2024-07-19 RX ADMIN — ACETAMINOPHEN 650 MG: 325 TABLET, FILM COATED ORAL at 00:26

## 2024-07-19 RX ADMIN — DILTIAZEM HYDROCHLORIDE 60 MG: 60 TABLET, FILM COATED ORAL at 14:14

## 2024-07-19 RX ADMIN — ACETAMINOPHEN 650 MG: 325 TABLET, FILM COATED ORAL at 15:02

## 2024-07-19 RX ADMIN — ACETAMINOPHEN 650 MG: 325 TABLET, FILM COATED ORAL at 11:07

## 2024-07-19 RX ADMIN — DILTIAZEM HYDROCHLORIDE 60 MG: 60 TABLET, FILM COATED ORAL at 05:01

## 2024-07-19 RX ADMIN — ATORVASTATIN CALCIUM 40 MG: 40 TABLET, FILM COATED ORAL at 20:03

## 2024-07-19 RX ADMIN — ACETAMINOPHEN 650 MG: 325 TABLET, FILM COATED ORAL at 20:19

## 2024-07-19 RX ADMIN — METOPROLOL TARTRATE 100 MG: 50 TABLET, FILM COATED ORAL at 20:03

## 2024-07-19 RX ADMIN — Medication 1 MG: at 23:19

## 2024-07-19 RX ADMIN — DIGOXIN 125 MCG: 125 TABLET ORAL at 08:20

## 2024-07-19 ASSESSMENT — ACTIVITIES OF DAILY LIVING (ADL)
ADLS_ACUITY_SCORE: 58
ADLS_ACUITY_SCORE: 64
ADLS_ACUITY_SCORE: 62
ADLS_ACUITY_SCORE: 58
ADLS_ACUITY_SCORE: 62
ADLS_ACUITY_SCORE: 62
ADLS_ACUITY_SCORE: 58
ADLS_ACUITY_SCORE: 62
ADLS_ACUITY_SCORE: 58
ADLS_ACUITY_SCORE: 62
ADLS_ACUITY_SCORE: 58
ADLS_ACUITY_SCORE: 62
ADLS_ACUITY_SCORE: 62
ADLS_ACUITY_SCORE: 58
ADLS_ACUITY_SCORE: 58

## 2024-07-19 NOTE — PLAN OF CARE
Goal Outcome Evaluation:      Plan of Care Reviewed With: patient    Overall Patient Progress: improving Overall Patient Progress: improving     A&O x4, VSS on RA. Pt on tele, reading a.fib. Pt is compliant with all cares but refused positioning during the early night hours due to staying up. She stated that she is able to repo by herself despite R side deficits. Pt asked appropriate questions regarding her health condition and seem interested in learning more. She is engaged in her conversations. She demonstrates in having hope to gain strength back and is eager to participate in PT and OT in her new placement. She voices that she wants to recover from her stroke as much as possible.     Chronic migraines reported by pt, managed with Tylenol q4h, crushed and admin via [employer].    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 Pt is pleasant and patient in receiving her cares.    She refused 6am labs but was willing to try labs at a later time. Educated pt on why labs are pertinent to her health.

## 2024-07-19 NOTE — PROGRESS NOTES
Care Management Follow Up    Length of Stay (days): 3    Expected Discharge Date: 07/22/2024     Concerns to be Addressed:       Patient plan of care discussed at interdisciplinary rounds: Yes    Anticipated Discharge Disposition:  Long term care     Anticipated Discharge Services:  None  Anticipated Discharge DME:  None    Patient/family educated on Medicare website which has current facility and service quality ratings:  yes  Education Provided on the Discharge Plan:  yes  Patient/Family in Agreement with the Plan:  yes    Referrals Placed by CM/SW:      Pending  [place] [address]   [facility] [address]  P: [phone]  F: [phone]  7/17: Initial referral sent via [employer]    [address] [address]  P: [phone]  F: [phone]  7/19: Referral resent via [address] [address]  P: [phone]  F: [phone]  7/19: Initial referral sent via [facility]  [address] [facility] [address] [facility] [place] [address]  P: [phone]  F: [phone]  7/19: Initial referral sent via [facility]  [address] [place] [address]  P: [phone]  F: [phone]  7/19: Initial referral sent via Epic    Declined  [facility]: Bed not available   [facility]: Bed not available   [facility]: Acuity too high  [facility]: Bed not available   [facility]: Bed not available   [facility]: Bed not available   [facility]: Bed not available, can't meet patient's needs.     Private pay costs discussed: Not applicable    Additional Information:  Patient is med ready and is needing LTC placement.  Additional referrals sent.    SW will continue to follow for discharge needs and placement.     CHHAYA Lopez  [address]   Office: [phone]  [email or web address]  Securely  message with Vocera (Search Name or 7C Med Surg [phone] SW)  Text page via [facility] Paging/Directory   See signed in provider for up to date coverage information  Social Work & Care Management Department

## 2024-07-19 NOTE — PLAN OF CARE
Assumed care [id number]     Events this shift: A&Ox4, intermittent confusion, forgetful. C/o 9-10/10 migraine since 11:00; PRN tylenol given x2, ineffective per pt, MD notified. AV block noted this PM, MD notified & EKG done. Amitriptyline given for migraine, minimally effective per pt. 1:1 supervision with meals. Purewick in place.    Plan: Awaiting LTC placement. Continue POC & notify providers with any acute changes.

## 2024-07-19 NOTE — PROGRESS NOTES
Physician Attestation   I, Meena Price MD, was present with the medical/ENMANUEL student who participated in the service and in the documentation of the note.  I have verified the history and personally performed the physical exam and medical decision making.  I agree with the assessment and plan of care as documented in the note.      Key findings: Daughter updated. Medically stable, awaiting LTC placement.     Please see A&P for additional details of medical decision making.    I have personally reviewed the following data over the past 24 hrs:    10.4  \   10.9 (L)   / 340     137 102 15.6 /  218 (H)   4.3 23 1.04 (H) \         Meena Price MD  Date of Service (when I saw the patient): 07/19/24    [facility]    Progress Note - Hospitalist Service, GOLD TEAM 10       Date of Admission:  7/16/2024    Assessment & Plan   Roya Burgos is a 69 year old female admitted on 7/16/2024 for evaluation of AMS. She has PMHx of recent ischemic left MCA stroke c/b cerebral edema s/p decompressive hemicraniectomy c/b hemorrhagic transformation 2/2 therapeutic anticoagulation for atrial fibrillation old complicated by dysarthria and significant right-sided berhane-paralysis, oropharyngeal dysphagia s/p percutaneous gastrostomy tube on tube feeding, chronic migraine headache, paroxysmal atrial fibrillation on aspirin only given brain bleeding, chronic heart failure with mildly reduced ejection fraction, hypertension, type 2 diabetes mellitus, hyperlipidemia, chronic kidney disease stage III A, L parotid gland nodule, Bilateral moderate carotid stenosis, and COPD not on home oxygen.  The patient had frequent hospitalization in [facility] and was recently discharged on 7/14/2024 for care at home with home visiting nurse service, but returned as daughter was unable to care for her at home. Hemodynamically and clinically stable.     Changes today 7/19:  - Medically stable and  ready for discharge, awaiting long-term care facility placement.  - Continue PT/OT.  - Resume PTA losartan 50mg PO daily, Cr stable.  - Updated family via phone on changes today.    AMS, resolved  Reported to be agitated and not herself. Labs obtained on admission thus far unrevealing (CXR negative, UA neg, digoxin level wnl, CT head neg). VBG w/ no hypercarbia, LA mildly elevated at 2.1, now 1.9 (7/18). Cdiff and enteric panel neg. Covid neg. CRP of 31 on admission and downtrending to 28 on 7/18. Ammonia wnl. Blood cultures pending. On our interview on 7/17, pt is conversant and does not appear altered. It appears that she has returned back to baseline.  -Holding off on antibiotics given lack of evidence of sepsis and lack of clear infectious source  -Neurochecks every 4 hours for the first 48 hours of admission  -Requested a pharmacy consult to review etiologies for acute toxic encephalopathy.              - No changes needed after review of meds.             Recent ischemic left MCA stroke c/b cerebral edema s/p decompressive hemicraniectomy c/b hemorrhagic transformation secondary to therapeutic anticoagulation  Dysarthria   Significant right-sided berhane-paralysis  oropharyngeal dysphagia s/p percutaneous gastrostomy tube on tube feeding  -Will resume aspirin once we ensure there is no active brain bleeding- pt reports not taking, and discharge summaries indicate discontinuation, will attempt verification w/ pharmacy   -Requested dietitian consult to resume tube feeding.  - SPL consulted.              - Initiate pureed (IDDSI 4) diet and thin liquids (7/17) and advanced to IDDSI 5 (7/18) with thin liquids.              - SLP will follow for dysphagia management and trialing advanced solids when pt's dentures are present.  -PTA Atorvastatin.  -The patient will need neurosurgery follow-up in 6 weeks based on documentation from prior hospital (appears to be scheduled August 2024)     Acute kidney injury, akin stage  I on top of CKD stage III, resolved   Cr baseline 1-1.5 (2024) and Cr 1.5 on admission. BUN/Cr ~19, likely prerenal. Now improved on 7/18 with Cr 1.08 and BUN 17.5.  -Strict intake and output and daily body weight  - Resume PTA losartan given stable Cr on 7/19.     Afib  Digoxin levels wnl. Pt w/ afib w/ RVR earlier on 7/17 w/out home meds ordered, otherwise HDS. Will resume home meds  - PTA metoprolol tartrate 100 mg BID, diltiazem 60 mgs Q8h, digoxin 125mcg Daily   - telemetry  - pt not on AC 2/2 hemorrhagic stroke hx     Chronic heart failure with mildly reduced ejection fraction LVEF 46%TTE from an outside hospital (6/6/24):  1. Limited echo.  2. Mild LV enlargement with mildly increased wall thickness. Calculated biplane LVEF 46%. Mild global hypokinesis. No LV thrombus.  3. Mild RV enlargement with mildly decreased systolic function. Estimated PASP 25 mmHg + RA Pressure.   -Consider switching metoprolol tartrate to metoprolol succinate prior to discharge for HF   -Consider switching losartan to valsartan once acute kidney injury resolves then eventually initiation of Entresto  - resume PTA empagliflozin     T2DM, Uncontrolled (7/15/2024 @ 7.4)  Hemoglobin A1c at [facility] was 9.2 on 4/4/2024. Repeat on 7/15 7.4   - resume PTA empagloflozin, lantus 8 units  -Started insulin NovoLog coverage  -Will hold off on metformin in setting of acute kidney injury  -Holding PTA gabapentin 300 mg 3 times daily due to ?sedation on 7/18 AM      Uncontrolled hypertension  - Resume PTA losartan (7/19-xx).  - resume PTA metoprolol as above      Migraine headache  - hold PTA amitriptyline, noted to be sedated 7/18 AM > resume on 7/19  - tylenol PRN     COPD not on home oxygen  Breathing comfortably on RA  - resume PTA inhaler (fluticasone vilanterol)  - Duonebs PRN         Need for placement into a transitional care unit versus long-term care  Daughter Miles on phone and discussed that she is agreeable for placement for patient as  "she was unable to care for her at home due to her medical needs.   -Case management/social work consulted      Diet: Adult Formula Bolus Feeding: Jevity 1.5; Route: Gastrostomy; 5 times daily; Volume per Bolus: 1; Can(s)/ Carton(s); Rec giving as 1 carton @ [id number] or per pt preference  Minced & Moist Diet (level 5) Thin Liquids (level 0) (1:1 supervision while on thin liquids)    DVT Prophylaxis: Pneumatic Compression Devices  Rhodes Catheter: Not present  Fluids: None  Lines: None     Cardiac Monitoring: ACTIVE order. Indication: QTc prolonging medication (48 hours)  Code Status: Full Code      Clinically Significant Risk Factors           # Hypercalcemia: corrected calcium is >10.1, will monitor as appropriate    # Hypoalbuminemia: Lowest albumin = 3.3 g/dL at 7/18/2024  6:46 AM, will monitor as appropriate     # Hypertension: Noted on problem list             # DMII: A1C = 7.4 % (Ref range: <5.7 %) within past 6 months, PRESENT ON ADMISSION  # Obesity: Estimated body mass index is 39.44 kg/m  as calculated from the following:    Height as of this encounter: 1.651 m (5' 5\").    Weight as of this encounter: 107.5 kg (237 lb)., PRESENT ON ADMISSION            Disposition Plan      Expected Discharge Date: 07/22/2024    Discharge Delays: *Medically Ready for Discharge  Placement - LTC    Discharge Comments: Dispo: LTC facility        The patient's care was discussed with the Attending Physician, Dr. Meena Price .    Glen Treadwell  Medical Student  Hospitalist Service, GOLD TEAM 10  [facility]  Securely message with [employer] (more info)  Text page via [facility] Paging/Directory   See signed in provider for up to date coverage information  ______________________________________________________________________    Interval History   No acute events overnight. Nursing notes reviewed. Patient seen this AM during rounds. Reports sleeping well. States that her " chronic migraine is under control. Denies any changes in vision, chest pain, palpitations, difficulty breathing, constipation, diarrhea. Has been stooling and voiding appropriately.    Physical Exam   Vital Signs: Temp: 98.4  F (36.9  C) Temp src: Oral BP: 137/77 Pulse: 79   Resp: 16 SpO2: 97 % O2 Device: None (Room air)    Weight: 237 lbs 0 oz    Constitutional: awake, alert, cooperative, no apparent distress, lying down on hospital bed.  Eyes: extra ocular muscles intact, sclera anicteric.  Respiratory: No increased work of breathing on room air, good air exchange, clear to auscultation bilaterally, no crackles or wheezing  Cardiovascular: RRR, normal S1 and S2, did not appreciate any murmur, rubs or gallops.  GI: normal bowel sounds, soft, non-distended, non-tender, PEG in place, c/d/I.  Skin: Warm and dry. No lower extremity pitting edema. No suspicious rash on exposed skin.  Musculoskeletal: Right-sided hemiparesis.  Neurologic: Awake, alert, oriented to name, place and time.    Medical Decision Making       Please see A&P for additional details of medical decision making.      Data     I have personally reviewed the following data over the past 24 hrs:    10.4  \   10.9 (L)   / 340     137 102 15.6 /  218 (H)   4.3 23 1.04 (H) \       Imaging results reviewed over the past 24 hrs:   No results found for this or any previous visit (from the past 24 hour(s)).

## 2024-07-20 ENCOUNTER — APPOINTMENT (OUTPATIENT)
Dept: SPEECH THERAPY | Facility: CLINIC | Age: 69
DRG: 948 | End: 2024-07-20
Payer: COMMERCIAL

## 2024-07-20 LAB
GLUCOSE BLDC GLUCOMTR-MCNC: 160 MG/DL (ref 70–99)
GLUCOSE BLDC GLUCOMTR-MCNC: 182 MG/DL (ref 70–99)
GLUCOSE BLDC GLUCOMTR-MCNC: 204 MG/DL (ref 70–99)
GLUCOSE BLDC GLUCOMTR-MCNC: 205 MG/DL (ref 70–99)
GLUCOSE BLDC GLUCOMTR-MCNC: 260 MG/DL (ref 70–99)
GLUCOSE BLDC GLUCOMTR-MCNC: 266 MG/DL (ref 70–99)

## 2024-07-20 PROCEDURE — 120N000002 HC R&B MED SURG/OB UMMC

## 2024-07-20 PROCEDURE — 250N000013 HC RX MED GY IP 250 OP 250 PS 637: Performed by: STUDENT IN AN ORGANIZED HEALTH CARE EDUCATION/TRAINING PROGRAM

## 2024-07-20 PROCEDURE — 92526 ORAL FUNCTION THERAPY: CPT | Mod: GN

## 2024-07-20 PROCEDURE — 99232 SBSQ HOSP IP/OBS MODERATE 35: CPT | Performed by: INTERNAL MEDICINE

## 2024-07-20 PROCEDURE — 999N000111 HC STATISTIC OT IP EVAL DEFER

## 2024-07-20 RX ADMIN — ACETAMINOPHEN 650 MG: 325 TABLET, FILM COATED ORAL at 14:28

## 2024-07-20 RX ADMIN — INSULIN GLARGINE 8 UNITS: 100 INJECTION, SOLUTION SUBCUTANEOUS at 22:12

## 2024-07-20 RX ADMIN — AMITRIPTYLINE HYDROCHLORIDE 10 MG: 10 TABLET, FILM COATED ORAL at 22:11

## 2024-07-20 RX ADMIN — ACETAMINOPHEN 650 MG: 325 TABLET, FILM COATED ORAL at 09:58

## 2024-07-20 RX ADMIN — DIGOXIN 125 MCG: 125 TABLET ORAL at 09:59

## 2024-07-20 RX ADMIN — ACETAMINOPHEN 650 MG: 325 TABLET, FILM COATED ORAL at 22:11

## 2024-07-20 RX ADMIN — Medication 1 MG: at 19:48

## 2024-07-20 RX ADMIN — ACETAMINOPHEN 650 MG: 325 TABLET, FILM COATED ORAL at 06:04

## 2024-07-20 RX ADMIN — SENNOSIDES AND DOCUSATE SODIUM 2 TABLET: 50; 8.6 TABLET ORAL at 22:11

## 2024-07-20 RX ADMIN — DILTIAZEM HYDROCHLORIDE 60 MG: 60 TABLET, FILM COATED ORAL at 06:04

## 2024-07-20 RX ADMIN — DOCUSATE SODIUM 50 MG AND SENNOSIDES 8.6 MG 1 TABLET: 8.6; 5 TABLET, FILM COATED ORAL at 14:28

## 2024-07-20 RX ADMIN — LOSARTAN POTASSIUM 50 MG: 50 TABLET, FILM COATED ORAL at 09:58

## 2024-07-20 RX ADMIN — DILTIAZEM HYDROCHLORIDE 60 MG: 60 TABLET, FILM COATED ORAL at 22:11

## 2024-07-20 RX ADMIN — METOPROLOL TARTRATE 100 MG: 50 TABLET, FILM COATED ORAL at 09:59

## 2024-07-20 RX ADMIN — METOPROLOL TARTRATE 100 MG: 50 TABLET, FILM COATED ORAL at 19:48

## 2024-07-20 RX ADMIN — Medication 15 ML: at 09:58

## 2024-07-20 RX ADMIN — ATORVASTATIN CALCIUM 40 MG: 40 TABLET, FILM COATED ORAL at 19:48

## 2024-07-20 RX ADMIN — DILTIAZEM HYDROCHLORIDE 60 MG: 60 TABLET, FILM COATED ORAL at 14:28

## 2024-07-20 RX ADMIN — ACETAMINOPHEN 650 MG: 325 TABLET, FILM COATED ORAL at 18:25

## 2024-07-20 RX ADMIN — EMPAGLIFLOZIN 10 MG: 10 TABLET, FILM COATED ORAL at 10:08

## 2024-07-20 ASSESSMENT — ACTIVITIES OF DAILY LIVING (ADL)
ADLS_ACUITY_SCORE: 64
DEPENDENT_IADLS:: CLEANING;COOKING;LAUNDRY;SHOPPING;MEAL PREPARATION;MEDICATION MANAGEMENT;MONEY MANAGEMENT;TRANSPORTATION;INCONTINENCE
ADLS_ACUITY_SCORE: 64
ADLS_ACUITY_SCORE: 64

## 2024-07-20 NOTE — PROGRESS NOTES
Physician Attestation   I, Renaldo Poole MD, was present with the medical/ENMANUEL student who participated in the service and in the documentation of the note.  I have verified the history and personally performed the physical exam and medical decision making.  I agree with the assessment and plan of care as documented in the note.      Key findings: No new symptoms. Await LTC placement  Avoiding percocet if can control symptoms in other way to promote daytime alertness. Prefer other agents as anti-migraine meds--such as restarting home gabapentin (currently on hold given possible sedation earlier in hospitalization)          Renaldo Poole MD  Date of Service (when I saw the patient): 07/20/24    [facility]    Progress Note - Hospitalist Service, GOLD TEAM 10       Date of Admission:  7/16/2024    Assessment & Plan   Roya Burgos is a 69 year old female admitted on 7/16/2024 for evaluation of AMS. She has PMHx of recent ischemic left MCA stroke c/b cerebral edema s/p decompressive hemicraniectomy c/b hemorrhagic transformation 2/2 therapeutic anticoagulation for atrial fibrillation old complicated by dysarthria and significant right-sided berhane-paralysis, oropharyngeal dysphagia s/p percutaneous gastrostomy tube on tube feeding, chronic migraine headache, paroxysmal atrial fibrillation on aspirin only given brain bleeding, chronic heart failure with mildly reduced ejection fraction, hypertension, type 2 diabetes mellitus, hyperlipidemia, chronic kidney disease stage III A, L parotid gland nodule, Bilateral moderate carotid stenosis, and COPD not on home oxygen.  The patient had frequent hospitalization in [facility] and was recently discharged on 7/14/2024 for care at home with home visiting nurse service, but returned as daughter was unable to care for her at home. Hemodynamically and clinically stable.    Changes today 7/20:  - Medically stable and ready  for discharge, awaiting long-term care facility placement.  - Continue PT/OT.  - Updated family via phone on patient's plan and condition. Family indicated understanding on holding percocet as a last resort for migraine headache.    Migraine headache  Per chart review, patient has been on percocet (10-325mg TID) since 09/2014, but indications are unclear.  - hold PTA amitriptyline, noted to be sedated 7/18 AM > resume on 7/19  - tylenol PRN    Afib  Digoxin levels wnl. Pt w/ afib w/ RVR earlier on 7/17 w/out home meds ordered, otherwise HDS. Will resume home meds  - PTA metoprolol tartrate 100 mg BID, diltiazem 60 mgs Q8h, digoxin 125mcg Daily   - telemetry  - pt not on AC 2/2 hemorrhagic stroke hx    AMS, resolved  Reported to be agitated and not herself. Labs obtained on admission thus far unrevealing (CXR negative, UA neg, digoxin level wnl, CT head neg). VBG w/ no hypercarbia, LA mildly elevated at 2.1, now 1.9 (7/18). Cdiff and enteric panel neg. Covid neg. CRP of 31 on admission and downtrending to 28 on 7/18. Ammonia wnl. Blood cultures pending. On our interview on 7/17, pt is conversant and does not appear altered. It appears that she has returned back to baseline.  -Holding off on antibiotics given lack of evidence of sepsis and lack of clear infectious source  -Neurochecks every 4 hours for the first 48 hours of admission  -Requested a pharmacy consult to review etiologies for acute toxic encephalopathy.              - No changes needed after review of meds.             Recent ischemic left MCA stroke c/b cerebral edema s/p decompressive hemicraniectomy c/b hemorrhagic transformation secondary to therapeutic anticoagulation  Dysarthria   Significant right-sided berhane-paralysis  oropharyngeal dysphagia s/p percutaneous gastrostomy tube on tube feeding  -Will resume aspirin once we ensure there is no active brain bleeding- pt reports not taking, and discharge summaries indicate discontinuation, will attempt  verification w/ pharmacy   -Requested dietitian consult to resume tube feeding.  - SPL consulted.              - Initiate pureed (IDDSI 4) diet and thin liquids (7/17) and advanced to IDDSI 5 (7/18) with thin liquids.              - SLP will follow for dysphagia management and trialing advanced solids when pt's dentures are present.  -PTA Atorvastatin.  -The patient will need neurosurgery follow-up in 6 weeks based on documentation from prior hospital (appears to be scheduled August 2024)     Acute kidney injury, akin stage I on top of CKD stage III, resolved   Cr baseline 1-1.5 (2024) and Cr 1.5 on admission. BUN/Cr ~19, likely prerenal. Now improved on 7/18 with Cr 1.08 and BUN 17.5.  -Strict intake and output and daily body weight  - Resume PTA losartan given stable Cr on 7/19.     Chronic heart failure with mildly reduced ejection fraction LVEF 46%TTE from an outside hospital (6/6/24):  1. Limited echo.  2. Mild LV enlargement with mildly increased wall thickness. Calculated biplane LVEF 46%. Mild global hypokinesis. No LV thrombus.  3. Mild RV enlargement with mildly decreased systolic function. Estimated PASP 25 mmHg + RA Pressure.   -Consider switching metoprolol tartrate to metoprolol succinate prior to discharge for HF   -Consider switching losartan to valsartan once acute kidney injury resolves then eventually initiation of Entresto  - resumed PTA empagliflozin     T2DM, Uncontrolled (7/15/2024 @ 7.4)  Hemoglobin A1c at [facility] was 9.2 on 4/4/2024. Repeat on 7/15 7.4   - resume PTA empagloflozin, lantus 8 units  -Started insulin NovoLog coverage  -Will hold off on metformin in setting of acute kidney injury  -Holding PTA gabapentin 300 mg 3 times daily due to ?sedation on 7/18 AM      Uncontrolled hypertension  - Resume PTA losartan (7/19-xx).  - resume PTA metoprolol as above      COPD not on home oxygen  Breathing comfortably on RA  - resume PTA inhaler (fluticasone vilanterol)  - Duonebs PRN     Need for  "placement into a transitional care unit versus long-term care  Daughter Miles on phone and discussed that she is agreeable for placement for patient as she was unable to care for her at home due to her medical needs.   -Case management/social work consulted           Diet: Adult Formula Bolus Feeding: Jevity 1.5; Route: Gastrostomy; 5 times daily; Volume per Bolus: 1; Can(s)/ Carton(s); Rec giving as 1 carton @ [phone] or per pt preference  Minced & Moist Diet (level 5) Thin Liquids (level 0) (1:1 supervision while on thin liquids)  Room Service    DVT Prophylaxis: Pneumatic Compression Devices  Rhodes Catheter: Not present  Fluids: None  Lines: None     Cardiac Monitoring: ACTIVE order. Indication: Tachyarrhythmias, acute (48 hours)  Code Status: Full Code      Clinically Significant Risk Factors              # Hypoalbuminemia: Lowest albumin = 3.3 g/dL at 7/18/2024  6:46 AM, will monitor as appropriate     # Hypertension: Noted on problem list             # DMII: A1C = 7.4 % (Ref range: <5.7 %) within past 6 months, PRESENT ON ADMISSION  # Obesity: Estimated body mass index is 39.44 kg/m  as calculated from the following:    Height as of this encounter: 1.651 m (5' 5\").    Weight as of this encounter: 107.5 kg (237 lb)., PRESENT ON ADMISSION            Disposition Plan     Expected Discharge Date: 07/22/2024    Discharge Delays: *Medically Ready for Discharge  Placement - LTC    Discharge Comments: Dispo: LTC facility        The patient's care was discussed with the Attending Physician, Dr. Renaldo Poole .    Glen Treadwell  Medical Student  Hospitalist Service, GOLD TEAM 10  [facility]  Securely message with [employer] (more info)  Text page via [employer] Paging/Directory   See signed in provider for up to date coverage information  ______________________________________________________________________    Interval History   No acute events overnight. Nursing " "notes reviewed. Patient seen this AM during rounds. Per nursing, noting some episodes of disorientation 7/19 PM and increased migraine pain rated at 8-10/10 without any nausea or vomiting. Restarted amitriptyline on 7/20. Patient reports having bowel movements and voiding well. Denies any chest pain, heart palpitations, dyspnea. Reports eating and drinking well on current nutrition plan. Reports that her migraine is \"ok\".    Physical Exam   Vital Signs: Temp: 98.3  F (36.8  C) Temp src: Oral BP: 124/69 Pulse: 74   Resp: 19 SpO2: 94 % O2 Device: None (Room air)    Weight: 237 lbs 0 oz    Constitutional: awake, alert, cooperative, no apparent distress, lying down on hospital bed.  HEENT: extra ocular muscles intact, sclera anicteric. Post-surgical changes of left-sided hemicraniectomy noted.  Respiratory: No increased work of breathing on room air, good air exchange, clear to auscultation bilaterally, no crackles or wheezing  Cardiovascular: RRR, normal S1 and S2, did not appreciate any murmur, rubs or gallops.  GI: normal bowel sounds, soft, non-distended, non-tender, PEG in place, c/d/I.  Skin: Warm and dry. No lower extremity pitting edema. No suspicious rash on exposed skin.  Musculoskeletal: Right-sided hemiparesis.      Medical Decision Making       Please see A&P for additional details of medical decision making.      Data         Imaging results reviewed over the past 24 hrs:   No results found for this or any previous visit (from the past 24 hour(s)).  "

## 2024-07-20 NOTE — PLAN OF CARE
Goal Outcome Evaluation:       Met with patient at bedside. Plan for patient to discharge to LTC once placement is found. Patient requesting follow up with Health Care Directive, would like to name daughter, Miles, as Health Care Agent.    __________________________     LUBA Echols, JOYA  , [facility]  [address] Medical Surgical [address]eds [id number]   Desk Phone: [phone]   Securely message with [employer] (Search Name or  Med Surg [phone] SW)  Text page via [employer] Paging/Directory   See signed in provider for up to date coverage information  Social Work & Care Management Department  ___________________________________    Unit 7C Care Management Weekend Contacts:    Searchable in [employer] - search SOCIAL WORK or CARE COORDINATOR  Searchable in [employer] - search 7C Med Surg SW, 7C Med Surg RNCC     7C Weekend SW Vocera; ([phone])  7C Weekend RNCC Vocera; ([phone])  After hours  Vocera;  (Weekends (1630 - 0000); Mon-Fri ([phone]); [facility] Recognized Holidays  ([phone]))

## 2024-07-20 NOTE — PLAN OF CARE
Occupational Therapy: Orders received. Chart reviewed and discussed with SW.? Occupational Therapy not indicated due to pt is dep w/ I/ADLs and has been using a brennen lift for transfers. There is a safe discharge plan in place to LTC.? Defer discharge recommendations to medical team and .? Will complete orders.

## 2024-07-20 NOTE — PLAN OF CARE
Assumed care [id number]     Events this shift: Lethargic this AM but alert this evening. Intermittent confusion, forgetful. C/o 9-10/10 migraine; PRN tylenol given x3, ineffective per pt, MD aware. C/o constipation, PRN senna given x1. Family at bedside this afternoon. 1:1 supervision with meals. Purewick in place.    Plan: Awaiting LTC placement. Continue POC & notify providers with any acute changes.

## 2024-07-20 NOTE — CONSULTS
Care Management Initial Consult    General Information  Assessment completed with: Patient,    Type of CM/SW Visit: Initial Assessment  Primary Care Provider verified and updated as needed: Yes   Readmission within the last 30 days: previous discharge plan unsuccessful   Return Category: Progression of disease    Reason for Consult: discharge planning, utilization management concerns  Advance Care Planning: Advance Care Planning Reviewed: concerns discussed (patient would like blank HCD to complete, would like to name daughterMiles, her HCA)        Communication Assessment  Patient's communication style: spoken language (English or Bilingual)    Hearing Difficulty or Deaf: no   Wear Glasses or Blind: no    Cognitive  Cognitive/Neuro/Behavioral: .WDL except  Level of Consciousness: lethargic  Arousal Level: arouses to voice, arouses to repeated stimulation  Orientation: disoriented to, situation  Mood/Behavior: hypoactive (quiet, withdrawn)  Best Language: 0 - No aphasia  Speech: other (see comments) (slow to respond)    Living Environment:   People in home: alone, child(mabel), adult, grandchild(mabel), other (see comments) (patient was previously living alone, but most recently was living with daughter and grandson who were providing care for her.)     Current living Arrangements: apartment      Able to return to prior arrangements: no, family feels they can no longer safely care for patient and patient is unable to live alone safely    Family/Social Support:  Care provided by: child(mabel), other (see comments) (daughter, grandson)  Provides care for: no one, unable/limited ability to care for self  Marital Status: Single  Support System: Children, Sibling(s), Other (specify) (daughter, sister, grandson)          Description of Support System: Supportive, Involved    Support Assessment: Caregiver difficulty providing physical care/safety, Caregiver experiencing high stress    Current Resources:   Patient receiving  home care services: No  Community Resources: [facility]  Equipment currently used at home: walker, standard, wheelchair, manual  Supplies currently used at home: Enteral Nutrition & Supplies, Diabetic Supplies    Employment/Financial:  Employment Status:  patient not working, unable to determine source of income.  Financial Concerns:  none- patient on MA- will cover LTC  Referral to Financial Worker: No  Does the patient's insurance plan have a 3 day qualifying hospital stay waiver?  No    Lifestyle & Psychosocial Needs:  Social Determinants of Health     Food Insecurity: No Food Insecurity (7/6/2024)    Received from [facility]    Hunger Vital Sign     Worried About Running Out of Food in the Last Year: Never true     Ran Out of Food in the Last Year: Never true   Depression: At risk (9/22/2023)    Received from [facility]    PHQ-2     PHQ-2 TOTAL SCORE: 3   Housing Stability: Low Risk  (7/6/2024)    Received from [facility]    Housing Stability Vital Sign     Unable to Pay for Housing in the Last Year: No     Number of Places Lived in the Last Year: 1     In the last 12 months, was there a time when you did not have a steady place to sleep or slept in a shelter (including now)?: No   Tobacco Use: Medium Risk (7/6/2024)    Received from [facility]    Patient History     Smoking Tobacco Use: Former     Smokeless Tobacco Use: Unknown     Passive Exposure: Not on file   Financial Resource Strain: Low Risk  (1/25/2024)    Received from [facility]    Financial Resource Strain     Difficulty of Paying Living Expenses: 3     Difficulty of Paying Living Expenses: Not on file   Alcohol Use: Not on file   Transportation Needs: No Transportation Needs (7/6/2024)    Received from [facility]    PRAPARE - Transportation     Lack of Transportation (Medical): No     Lack of Transportation (Non-Medical): No   Physical Activity: Not on File  "(6/27/2022)    Received from [facility]    Physical Activity     Physical Activity: 0   Interpersonal Safety: Unknown (7/6/2024)    Received from [facility]    Humiliation, Afraid, Rape, and Kick questionnaire     Fear of Current or Ex-Partner: Not on file     Emotionally Abused: No     Physically Abused: No     Sexually Abused: No   Stress: Not on File (6/27/2022)    Received from [facility]    Stress     Stress: 0   Social Connections: Socially Integrated (1/25/2024)    [facility]    Social Connections     Frequency of Communication with Friends and Family: 0   Health Literacy: Not on file       Functional Status:  Prior to admission patient needed assistance:   Dependent ADLs:: Bathing, Dressing, Eating, Grooming, Incontinence, Positioning, Transfers, Wheelchair-with assist  Dependent IADLs:: Cleaning, Cooking, Laundry, Shopping, Meal Preparation, Medication Management, Money Management, Transportation, Incontinence  Assesssment of Functional Status: Has complex medical needs, requires placement in a facility, At functional baseline    Mental Health Status:  Mental Health Status: No Current Concerns       Chemical Dependency Status:  Chemical Dependency Status: No Current Concerns           Values/Beliefs:  Spiritual, Cultural Beliefs, Cheondoism Practices, Values that affect care: no             Additional Information:  Background: Per H&P, \"Roya Burgos is a 69 year old female with a past medical history of type 2 diabetes, hypertension, recent ischemic left MCA CVA complicated by cerebral edema status post decompressive hemicraniectomy complicated by hemorrhagic transformation secondary to therapeutic anticoagulation with residual dysarthria, oropharyngeal dysphagia s/p PEG tube on TF's and hemiparesis (5/2024), CKD stage 3, cardiomyopathy, recently diagnosed heart failure, atrial fibrillation with RVR (on diltiazem, lopressor, digoxin), and COPD who presents to the ED " "via ambulance from home for evaluation of subjective fevers, urinary frequency and concerns for UTI. She had a recent stroke in March 2024 with right side deficit. Patient have been in and out of the hospital due to stroke and was finally discharged home two days ago. Family noticed that patient has been warm to the touch, urinary frequency which patient and family are suspecting a UTI. Patient is bed bound. Patient, home health staff and family feel that patient needs to be moved to a facility with high level of care.       Epic records reviewed.      [facility] after her hospitalization after initial left MCA CVA.      Hospitalized from  6/30/2024 to 7/1/2024 for evaluation after a headache.   ED note 6/30/2024:  \"I had a long discussion with the patient's daughter, who took the patient home from the hospital 2 days ago and is having a hard time caring for her at home. Staff in the hospital had recommended TCU, but they were unable to find her a bed other than at a place where patient has been previously and daughter was unhappy with the care she received there. Daughter thought she would be able to take care of her at home, so has been caring for her. The cares the patient is requiring are far more than she can handle and she is unable to get the patient into the tub to bathe her and does not have the resources needed to care for her 24 hours today. Daughter would like patient to be readmitted for placement to a TCU\"      Discharge summary: \"Daughter Miles asked if pt can go to TCU/ARU as she wanted her to get aggressive therapy I hope pt's right sided deficit would improve. She didn't think aggressive therapy can be done at home... PT/OT worked with patient and they don't think she qualifies for ARU/TCU and is more appropriate for LTC. After this discussion, pt's daughter elected to take her home and try again with home care.\"      Hospitalized from 7/6/2024 to 7/14/2024 with " "Headache, need for long term care placement      Hospitalist note 7/6/24  \"Disposition Planning: has been living with patients daughter. Daughter reports it has been challenging to care for her. She spoke with a long term care facility on Friday whom reportedly may have a bed available on Monday. Patient's daughter reports it is unsafe for her to discharge to patient's own house with patients grandson/daughters son caring for her until then.\"     Discharge summary 7/14/2024: \"FTT at home  Initial reason for admission. Family not able able to care for patient at home. However, after further consideration, daughter wanted to get patient set up with more resources at home and give it one more try. Able to set up with supplies, hospital bed, and home cares\"     Progress:   met with patient at bedside and introduced self and role of hospital  and nature of Care Management Assessment.  Patient was agreeable to meeting with SW.  Patient address, PCP, contacts and insurance were verified. SW discussed HCD, and patient indicated she does not have one on file but would like to complete a HCD while in the hospital.  SW discussed recommendations for LTC with patient, which she stated she was in agreement with receiving help to find LTC as things were not going well with her daughter caring for her at home.  Patient denied receiving any home care and was not sure which agency she received Tube Feeding supplies through.  Patient gave consent for SW to contact her daughter about care coordination. Towards the end of the conversation, patient stated that she was \"tired of talking to doctors\" and wanted to \"go home.\"  SW reiterated the need to establish a safe discharge plan and patient will be informed of any updates on placement.       Next Steps:    -Social work will continue to work with family to ensure safe discharge placement.  Social work will continue to follow up on LTC referrals and place " additional referrals as necessary, see below.        Pending  [facility]  [address]   [facility] [address]  P: [phone]  F: [phone]  7/17: Initial referral sent via [employer]     [facility] [address] [place] [address]  P: [phone]  F: [phone]  7/19: [facility]  [address] [facility] [place] [address]  P: [phone]  F: [phone]  7/19: Initial referral sent via [employer]     [facility]  [address] [place] [address]  P: [phone]  F: [phone]  7/19: Initial referral sent via Epic     [facility]: Bed not available   [facility]: Bed not available   [facility]: Acuity too high  [facility]: Bed not available   [facility]: Bed not available   [facility]: Bed not available   [facility]: Bed not available, can't meet patient's needs.   [facility]; Bed not available, cannot meet patient's needs, payer/insurance denied or not accepted    -Social work will follow up with [facility]. Patient states she would like daughter, Miles, listed as her HC Agent.     __________________________     LUBA Echols, JOS  , [facility]  [address] Medical Surgical Beds 3855-8939   Desk Phone: [phone]   Securely message with [employer] (Search Name or 7C Med Surg 3835-7935 )  Text page via [employer] Paging/Directory   See signed in provider for up to date coverage information  Social Work & Care Management Department  ___________________________________    Unit 7C Care Management Weekend Contacts:    Searchable in [employer] - search SOCIAL WORK or CARE COORDINATOR  Searchable in [employer] - search 7C Med Surg SW, 7C Med Surg RNCC     7C Weekend SW Vocera; ([phone])  7C Weekend RNCC Vocera; ([phone])  After hours  Vocera;  (Weekends (1630 -  0000); Mon-Fri (4993-9940); [facility] Recognized Holidays  (4115-2461))            LUBA CARRIZALES

## 2024-07-20 NOTE — PLAN OF CARE
"/79 (BP Location: Left arm, Patient Position: Semi-Tong's, Cuff Size: Adult Regular)   Pulse 78   Temp 97.9  F (36.6  C)   Resp 16   Ht 1.651 m (5' 5\")   Wt 107.5 kg (237 lb)   LMP  (LMP Unknown)   SpO2 97%   BMI 39.44 kg/m      Care from: 8785-9632    VS & Pain: VSS, pain from chronic migraine, PRN tylenol given  Neuro: A+Ox4  Respiratory: WDL, RA  Cardiac: on tele, a fib  Peripheral neurovascular: generalized weakness; R side paresis  GI/: purewick in place  Nutrition: mince and moist/ thin liquid; 1:1 supervision for meals  Skin: no new concerns  Lines: PEG tube with 5 bolus per day  Activity: Ax2 w/ lift  Events this shift: bolus given at 0600; pt was trying to crawl out of bed at beginning of shift and asking for head to be at the foot of the bed- bed linens changed and melatonin given and pt slept almost all night    Plan:  discharge to TCU or LTCF  "

## 2024-07-21 LAB
GLUCOSE BLDC GLUCOMTR-MCNC: 201 MG/DL (ref 70–99)
GLUCOSE BLDC GLUCOMTR-MCNC: 215 MG/DL (ref 70–99)
GLUCOSE BLDC GLUCOMTR-MCNC: 228 MG/DL (ref 70–99)
GLUCOSE BLDC GLUCOMTR-MCNC: 238 MG/DL (ref 70–99)
GLUCOSE BLDC GLUCOMTR-MCNC: 264 MG/DL (ref 70–99)

## 2024-07-21 PROCEDURE — 250N000013 HC RX MED GY IP 250 OP 250 PS 637: Performed by: STUDENT IN AN ORGANIZED HEALTH CARE EDUCATION/TRAINING PROGRAM

## 2024-07-21 PROCEDURE — 250N000012 HC RX MED GY IP 250 OP 636 PS 637: Performed by: STUDENT IN AN ORGANIZED HEALTH CARE EDUCATION/TRAINING PROGRAM

## 2024-07-21 PROCEDURE — 99232 SBSQ HOSP IP/OBS MODERATE 35: CPT | Performed by: HOSPITALIST

## 2024-07-21 PROCEDURE — 120N000002 HC R&B MED SURG/OB UMMC

## 2024-07-21 RX ADMIN — METOPROLOL TARTRATE 100 MG: 50 TABLET, FILM COATED ORAL at 09:36

## 2024-07-21 RX ADMIN — DIGOXIN 125 MCG: 125 TABLET ORAL at 09:36

## 2024-07-21 RX ADMIN — METOPROLOL TARTRATE 100 MG: 50 TABLET, FILM COATED ORAL at 20:53

## 2024-07-21 RX ADMIN — AMITRIPTYLINE HYDROCHLORIDE 10 MG: 10 TABLET, FILM COATED ORAL at 21:57

## 2024-07-21 RX ADMIN — SENNOSIDES AND DOCUSATE SODIUM 2 TABLET: 50; 8.6 TABLET ORAL at 09:46

## 2024-07-21 RX ADMIN — DILTIAZEM HYDROCHLORIDE 60 MG: 60 TABLET, FILM COATED ORAL at 14:43

## 2024-07-21 RX ADMIN — Medication 15 ML: at 09:29

## 2024-07-21 RX ADMIN — EMPAGLIFLOZIN 10 MG: 10 TABLET, FILM COATED ORAL at 09:29

## 2024-07-21 RX ADMIN — ACETAMINOPHEN 650 MG: 325 TABLET, FILM COATED ORAL at 06:22

## 2024-07-21 RX ADMIN — INSULIN ASPART 1 UNITS: 100 INJECTION, SOLUTION INTRAVENOUS; SUBCUTANEOUS at 21:57

## 2024-07-21 RX ADMIN — DILTIAZEM HYDROCHLORIDE 60 MG: 60 TABLET, FILM COATED ORAL at 21:57

## 2024-07-21 RX ADMIN — DILTIAZEM HYDROCHLORIDE 60 MG: 60 TABLET, FILM COATED ORAL at 06:22

## 2024-07-21 RX ADMIN — LOSARTAN POTASSIUM 50 MG: 50 TABLET, FILM COATED ORAL at 09:36

## 2024-07-21 RX ADMIN — ACETAMINOPHEN 650 MG: 325 TABLET, FILM COATED ORAL at 20:53

## 2024-07-21 RX ADMIN — INSULIN GLARGINE 8 UNITS: 100 INJECTION, SOLUTION SUBCUTANEOUS at 21:57

## 2024-07-21 RX ADMIN — ATORVASTATIN CALCIUM 40 MG: 40 TABLET, FILM COATED ORAL at 20:53

## 2024-07-21 ASSESSMENT — ACTIVITIES OF DAILY LIVING (ADL)
ADLS_ACUITY_SCORE: 64
ADLS_ACUITY_SCORE: 66
ADLS_ACUITY_SCORE: 64
ADLS_ACUITY_SCORE: 60
ADLS_ACUITY_SCORE: 64
ADLS_ACUITY_SCORE: 64
ADLS_ACUITY_SCORE: 60
ADLS_ACUITY_SCORE: 64
ADLS_ACUITY_SCORE: 60
ADLS_ACUITY_SCORE: 64
ADLS_ACUITY_SCORE: 60

## 2024-07-21 NOTE — PROGRESS NOTES
[facility]    Progress Note - Hospitalist Service, GOLD TEAM 10       Date of Admission:  7/16/2024    Assessment & Plan   Roya Burgos is a 69 year old female admitted on 7/16/2024 for evaluation of AMS. She has PMHx of recent ischemic left MCA stroke c/b cerebral edema s/p decompressive hemicraniectomy c/b hemorrhagic transformation 2/2 therapeutic anticoagulation for atrial fibrillation old complicated by dysarthria and significant right-sided berhane-paralysis, oropharyngeal dysphagia s/p percutaneous gastrostomy tube on tube feeding, chronic migraine headache, paroxysmal atrial fibrillation on aspirin only given brain bleeding, chronic heart failure with mildly reduced ejection fraction, hypertension, type 2 diabetes mellitus, hyperlipidemia, chronic kidney disease stage III A, L parotid gland nodule, Bilateral moderate carotid stenosis, and COPD not on home oxygen.  The patient had frequent hospitalization in [facility] and was recently discharged on 7/14/2024 for care at home with home visiting nurse service, but returned as daughter was unable to care for her at home. Hemodynamically and clinically stable.    Changes today 7/21:  - Appears patient remains medically stable and ready for discharge, awaiting long-term care facility placement.  - Continue PT/OT.  - Previously: Family indicated understanding on holding percocet as a last resort for migraine headache.    Migraine headache  Per chart review, patient has been on percocet (10-325mg TID) since 09/2014, but indications are unclear.  - hold PTA amitriptyline, noted to be sedated 7/18 AM > resume on 7/19  - tylenol PRN    Afib  Digoxin levels wnl. Pt w/ afib w/ RVR earlier on 7/17 w/out home meds ordered, otherwise HDS. Will resume home meds  - PTA metoprolol tartrate 100 mg BID, diltiazem 60 mgs Q8h, digoxin 125mcg Daily   - telemetry  - pt not on AC 2/2 hemorrhagic stroke hx    AMS,  resolved  Reported to be agitated and not herself. Labs obtained on admission thus far unrevealing (CXR negative, UA neg, digoxin level wnl, CT head neg). VBG w/ no hypercarbia, LA mildly elevated at 2.1, now 1.9 (7/18). Cdiff and enteric panel neg. Covid neg. CRP of 31 on admission and downtrending to 28 on 7/18. Ammonia wnl. Blood cultures pending. On our interview on 7/17, pt is conversant and does not appear altered. It appears that she has returned back to baseline.  -Holding off on antibiotics given lack of evidence of sepsis and lack of clear infectious source  -Neurochecks every 4 hours for the first 48 hours of admission  -Requested a pharmacy consult to review etiologies for acute toxic encephalopathy.              - No changes needed after review of meds.             Recent ischemic left MCA stroke c/b cerebral edema s/p decompressive hemicraniectomy c/b hemorrhagic transformation secondary to therapeutic anticoagulation  Dysarthria   Significant right-sided berhane-paralysis  oropharyngeal dysphagia s/p percutaneous gastrostomy tube on tube feeding  -Will resume aspirin once we ensure there is no active brain bleeding- pt reports not taking, and discharge summaries indicate discontinuation, will attempt verification w/ pharmacy   -Requested dietitian consult to resume tube feeding.  - SPL consulted.              - Initiate pureed (IDDSI 4) diet and thin liquids (7/17) and advanced to IDDSI 5 (7/18) with thin liquids.              - SLP will follow for dysphagia management and trialing advanced solids when pt's dentures are present.  -PTA Atorvastatin.  -The patient will need neurosurgery follow-up in 6 weeks based on documentation from prior hospital (appears to be scheduled August 2024)     Acute kidney injury, akin stage I on top of CKD stage III, resolved   Cr baseline 1-1.5 (2024) and Cr 1.5 on admission. BUN/Cr ~19, likely prerenal. Now improved on 7/18 with Cr 1.08 and BUN 17.5.  - Strict intake and  output and daily body weight  - Resume PTA losartan given stable Cr on 7/19.     Chronic heart failure with mildly reduced ejection fraction LVEF 46%TTE from an outside hospital (6/6/24):  1. Limited echo.  2. Mild LV enlargement with mildly increased wall thickness. Calculated biplane LVEF 46%. Mild global hypokinesis. No LV thrombus.  3. Mild RV enlargement with mildly decreased systolic function. Estimated PASP 25 mmHg + RA Pressure.   -Consider switching metoprolol tartrate to metoprolol succinate prior to discharge for HF   -Consider switching losartan to valsartan once acute kidney injury resolves then eventually initiation of Entresto  - resumed PTA empagliflozin     T2DM, Uncontrolled (7/15/2024 @ 7.4)  Hemoglobin A1c at [facility] was 9.2 on 4/4/2024. Repeat on 7/15 7.4   - resume PTA empagloflozin, lantus 8 units  - Started insulin NovoLog coverage  - Will hold off on metformin in setting of acute kidney injury  - Holding PTA gabapentin 300 mg 3 times daily due to ?sedation on 7/18 AM      Uncontrolled hypertension  - Resume PTA losartan (7/19-xx).  - resume PTA metoprolol as above      COPD not on home oxygen  Breathing comfortably on RA  - resume PTA inhaler (fluticasone vilanterol)  - Bill PRN     Need for placement into a transitional care unit versus long-term care  Daughter Miles on phone and discussed that she is agreeable for placement for patient as she was unable to care for her at home due to her medical needs.   - Case management/social work consulted           Diet: Adult Formula Bolus Feeding: Jevity 1.5; Route: Gastrostomy; 5 times daily; Volume per Bolus: 1; Can(s)/ Carton(s); Rec giving as 1 carton @ [id number] or per pt preference  Minced & Moist Diet (level 5) Thin Liquids (level 0) (1:1 supervision while on thin liquids)  Room Service    DVT Prophylaxis: Pneumatic Compression Devices  Rhodes Catheter: Not present  Fluids: None  Lines: None     Cardiac Monitoring: ACTIVE  "order. Indication: Tachyarrhythmias, acute (48 hours)  Code Status: Full Code      Clinically Significant Risk Factors              # Hypoalbuminemia: Lowest albumin = 3.3 g/dL at 7/18/2024  6:46 AM, will monitor as appropriate     # Hypertension: Noted on problem list             # DMII: A1C = 7.4 % (Ref range: <5.7 %) within past 6 months   # Obesity: Estimated body mass index is 39.44 kg/m  as calculated from the following:    Height as of this encounter: 1.651 m (5' 5\").    Weight as of this encounter: 107.5 kg (237 lb).             Disposition Plan     Expected Discharge Date: 07/22/2024    Discharge Delays: *Medically Ready for Discharge  Placement - LTC  Destination: nursing home  Discharge Comments: Dispo: LTC facility          Joshua Mcgrath MD  Text Page      Hospitalist Service, GOLD TEAM 10  [facility]  Securely message with [employer] (more info)  Text page via [employer] Paging/Directory   See signed in provider for up to date coverage information  ______________________________________________________________________    Interval History   No acute events overnight. Nursing notes reviewed. Patient seen this AM during rounds.     Pt denies any new present complaints this AM.    Physical Exam   Vital Signs: Temp: 97.5  F (36.4  C) Temp src: Oral BP: 129/66 Pulse: 72   Resp: 19 SpO2: 91 % O2 Device: None (Room air)    Weight: 237 lbs 0 oz    Constitutional: awake, alert, cooperative, no apparent distress, lying down on hospital bed.  HEENT: extra ocular muscles intact, sclera anicteric. Post-surgical changes of left-sided hemicraniectomy noted.  Respiratory: No increased work of breathing on room air, good air exchange, clear to auscultation bilaterally, no crackles or wheezing  Cardiovascular: RRR, normal S1 and S2, did not appreciate any murmur, rubs or gallops.  GI: normal bowel sounds, soft, non-distended, non-tender, PEG in place, c/d/I.  Skin: Warm and dry. No " lower extremity pitting edema. No suspicious rash on exposed skin.  Musculoskeletal: Right-sided hemiparesis.      Medical Decision Making       Please see A&P for additional details of medical decision making.      Data         Imaging results reviewed over the past 24 hrs:   No results found for this or any previous visit (from the past 24 hour(s)).

## 2024-07-21 NOTE — PLAN OF CARE
"/73 (BP Location: Left arm, Cuff Size: Adult Regular)   Pulse 90   Temp 98  F (36.7  C) (Oral)   Resp 18   Ht 1.651 m (5' 5\")   Wt 107.5 kg (237 lb)   LMP  (LMP Unknown)   SpO2 98%   BMI 39.44 kg/m      Care from: 2092-7638    VS & Pain: VSS, pain from chronic migraine, PRN tylenol given  Neuro: A+Ox4  Respiratory: WDL, RA  Cardiac: on tele, a fib  Peripheral neurovascular: generalized weakness; R side paresis  GI/: purewick in place, inc  Nutrition: mince and moist/ thin liquid; 1:1 supervision for meals  Skin: no new concerns  Lines: PEG tube with 5 bolus per day  Activity: Ax2 w/ lift  Events this shift: bolus given at 0600; melatonin given and pt slept almost all night    Plan:  discharge to LTCF  "

## 2024-07-22 ENCOUNTER — APPOINTMENT (OUTPATIENT)
Dept: SPEECH THERAPY | Facility: CLINIC | Age: 69
DRG: 948 | End: 2024-07-22
Payer: COMMERCIAL

## 2024-07-22 LAB
ATRIAL RATE - MUSE: 96 BPM
DIASTOLIC BLOOD PRESSURE - MUSE: NORMAL MMHG
GLUCOSE BLDC GLUCOMTR-MCNC: 178 MG/DL (ref 70–99)
GLUCOSE BLDC GLUCOMTR-MCNC: 215 MG/DL (ref 70–99)
GLUCOSE BLDC GLUCOMTR-MCNC: 216 MG/DL (ref 70–99)
GLUCOSE BLDC GLUCOMTR-MCNC: 268 MG/DL (ref 70–99)
GLUCOSE BLDC GLUCOMTR-MCNC: 273 MG/DL (ref 70–99)
GLUCOSE BLDC GLUCOMTR-MCNC: 275 MG/DL (ref 70–99)
INTERPRETATION ECG - MUSE: NORMAL
P AXIS - MUSE: NORMAL DEGREES
PR INTERVAL - MUSE: NORMAL MS
QRS DURATION - MUSE: 142 MS
QT - MUSE: 392 MS
QTC - MUSE: 435 MS
R AXIS - MUSE: -61 DEGREES
SYSTOLIC BLOOD PRESSURE - MUSE: NORMAL MMHG
T AXIS - MUSE: 67 DEGREES
VENTRICULAR RATE- MUSE: 74 BPM

## 2024-07-22 PROCEDURE — 250N000013 HC RX MED GY IP 250 OP 250 PS 637: Performed by: STUDENT IN AN ORGANIZED HEALTH CARE EDUCATION/TRAINING PROGRAM

## 2024-07-22 PROCEDURE — 92526 ORAL FUNCTION THERAPY: CPT | Mod: GN

## 2024-07-22 PROCEDURE — 120N000002 HC R&B MED SURG/OB UMMC

## 2024-07-22 PROCEDURE — 99232 SBSQ HOSP IP/OBS MODERATE 35: CPT | Performed by: HOSPITALIST

## 2024-07-22 PROCEDURE — 999N000147 HC STATISTIC PT IP EVAL DEFER

## 2024-07-22 RX ADMIN — DILTIAZEM HYDROCHLORIDE 60 MG: 60 TABLET, FILM COATED ORAL at 06:34

## 2024-07-22 RX ADMIN — LOSARTAN POTASSIUM 50 MG: 50 TABLET, FILM COATED ORAL at 10:06

## 2024-07-22 RX ADMIN — METOPROLOL TARTRATE 100 MG: 50 TABLET, FILM COATED ORAL at 10:06

## 2024-07-22 RX ADMIN — DILTIAZEM HYDROCHLORIDE 60 MG: 60 TABLET, FILM COATED ORAL at 22:45

## 2024-07-22 RX ADMIN — Medication 15 ML: at 10:05

## 2024-07-22 RX ADMIN — ACETAMINOPHEN 650 MG: 325 TABLET, FILM COATED ORAL at 18:57

## 2024-07-22 RX ADMIN — INSULIN GLARGINE 8 UNITS: 100 INJECTION, SOLUTION SUBCUTANEOUS at 22:45

## 2024-07-22 RX ADMIN — INSULIN ASPART 1 UNITS: 100 INJECTION, SOLUTION INTRAVENOUS; SUBCUTANEOUS at 22:45

## 2024-07-22 RX ADMIN — DILTIAZEM HYDROCHLORIDE 60 MG: 60 TABLET, FILM COATED ORAL at 15:45

## 2024-07-22 RX ADMIN — AMITRIPTYLINE HYDROCHLORIDE 10 MG: 10 TABLET, FILM COATED ORAL at 22:45

## 2024-07-22 RX ADMIN — EMPAGLIFLOZIN 10 MG: 10 TABLET, FILM COATED ORAL at 10:07

## 2024-07-22 RX ADMIN — METOPROLOL TARTRATE 100 MG: 50 TABLET, FILM COATED ORAL at 19:45

## 2024-07-22 RX ADMIN — DIGOXIN 125 MCG: 125 TABLET ORAL at 10:09

## 2024-07-22 RX ADMIN — ATORVASTATIN CALCIUM 40 MG: 40 TABLET, FILM COATED ORAL at 19:45

## 2024-07-22 RX ADMIN — ACETAMINOPHEN 650 MG: 325 TABLET, FILM COATED ORAL at 06:34

## 2024-07-22 ASSESSMENT — ACTIVITIES OF DAILY LIVING (ADL)
ADLS_ACUITY_SCORE: 60
ADLS_ACUITY_SCORE: 66
ADLS_ACUITY_SCORE: 60
ADLS_ACUITY_SCORE: 66
ADLS_ACUITY_SCORE: 60
ADLS_ACUITY_SCORE: 60
ADLS_ACUITY_SCORE: 64
ADLS_ACUITY_SCORE: 66
ADLS_ACUITY_SCORE: 60
ADLS_ACUITY_SCORE: 66
ADLS_ACUITY_SCORE: 60
ADLS_ACUITY_SCORE: 66

## 2024-07-22 NOTE — PLAN OF CARE
Physical Therapy: Orders received. Chart reviewed and discussed with care team.? Physical Therapy not indicated due to patient currently at functional baseline, uses lift for all transfers at home and dependent for majority of ADLs. Safe discharge plan in place with LTC placement.? Defer discharge recommendations to medical team/care coordination.? Will complete orders. Please reconsult if unable to locate safe discharge location or additional needs arise.

## 2024-07-22 NOTE — PROGRESS NOTES
[facility]    Progress Note - Hospitalist Service, GOLD TEAM 10       Date of Admission:  7/16/2024    Assessment & Plan   Roya Burgos is a 69 year old female admitted on 7/16/2024 for evaluation of AMS. She has PMHx of recent ischemic left MCA stroke c/b cerebral edema s/p decompressive hemicraniectomy c/b hemorrhagic transformation 2/2 therapeutic anticoagulation for atrial fibrillation old complicated by dysarthria and significant right-sided berhane-paralysis, oropharyngeal dysphagia s/p percutaneous gastrostomy tube on tube feeding, chronic migraine headache, paroxysmal atrial fibrillation on aspirin only given brain bleeding, chronic heart failure with mildly reduced ejection fraction, hypertension, type 2 diabetes mellitus, hyperlipidemia, chronic kidney disease stage III A, L parotid gland nodule, Bilateral moderate carotid stenosis, and COPD not on home oxygen.  The patient had frequent hospitalization in [facility] and was recently discharged on 7/14/2024 for care at home with home visiting nurse service, but returned as daughter was unable to care for her at home. Hemodynamically and clinically stable.    Changes today 7/22:  -Today the patient appears and remains medically stable and ready for discharge, awaiting long-term care facility placement.  - Continue PT/OT.  - Previously: Family indicated understanding on holding percocet as a last resort for migraine headache.    Migraine headache  Per chart review, patient has been on percocet (10-325mg TID) since 09/2014, but indications are unclear.  - hold PTA amitriptyline, noted to be sedated 7/18 AM > resume on 7/19  - tylenol PRN    Afib  Digoxin levels wnl. Pt w/ afib w/ RVR earlier on 7/17 w/out home meds ordered, otherwise HDS. Will resume home meds  - PTA metoprolol tartrate 100 mg BID, diltiazem 60 mgs Q8h, digoxin 125mcg Daily   - telemetry  - pt not on AC 2/2 hemorrhagic stroke hx    AMS,  resolved  Reported to be agitated and not herself. Labs obtained on admission thus far unrevealing (CXR negative, UA neg, digoxin level wnl, CT head neg). VBG w/ no hypercarbia, LA mildly elevated at 2.1, now 1.9 (7/18). Cdiff and enteric panel neg. Covid neg. CRP of 31 on admission and downtrending to 28 on 7/18. Ammonia wnl. Blood cultures pending. On our interview on 7/17, pt is conversant and does not appear altered. It appears that she has returned back to baseline.  -Holding off on antibiotics given lack of evidence of sepsis and lack of clear infectious source  -Neurochecks every 4 hours for the first 48 hours of admission  -Requested a pharmacy consult to review etiologies for acute toxic encephalopathy.              - No changes needed after review of meds.             Recent ischemic left MCA stroke c/b cerebral edema s/p decompressive hemicraniectomy c/b hemorrhagic transformation secondary to therapeutic anticoagulation  Dysarthria   Significant right-sided berhane-paralysis  oropharyngeal dysphagia s/p percutaneous gastrostomy tube on tube feeding  -Will resume aspirin once we ensure there is no active brain bleeding- pt reports not taking, and discharge summaries indicate discontinuation, will attempt verification w/ pharmacy   -Requested dietitian consult to resume tube feeding.  - SPL consulted.              - Initiate pureed (IDDSI 4) diet and thin liquids (7/17) and advanced to IDDSI 5 (7/18) with thin liquids.              - SLP will follow for dysphagia management and trialing advanced solids when pt's dentures are present.  -PTA Atorvastatin.  -The patient will need neurosurgery follow-up in 6 weeks based on documentation from prior hospital (appears to be scheduled August 2024)     Acute kidney injury, akin stage I on top of CKD stage III, resolved   Cr baseline 1-1.5 (2024) and Cr 1.5 on admission. BUN/Cr ~19, likely prerenal. Now improved on 7/18 with Cr 1.08 and BUN 17.5.  - Strict intake and  output and daily body weight  - Resume PTA losartan given stable Cr on 7/19.     Chronic heart failure with mildly reduced ejection fraction LVEF 46%TTE from an outside hospital (6/6/24):  1. Limited echo.  2. Mild LV enlargement with mildly increased wall thickness. Calculated biplane LVEF 46%. Mild global hypokinesis. No LV thrombus.  3. Mild RV enlargement with mildly decreased systolic function. Estimated PASP 25 mmHg + RA Pressure.   -Consider switching metoprolol tartrate to metoprolol succinate prior to discharge for HF   -Consider switching losartan to valsartan once acute kidney injury resolves then eventually initiation of Entresto  - resumed PTA empagliflozin     T2DM, Uncontrolled (7/15/2024 @ 7.4)  [facility]. Repeat on 7/15 7.4   - resume PTA empagloflozin, lantus 8 units  - Started insulin NovoLog coverage  - Will hold off on metformin in setting of acute kidney injury  - Holding PTA gabapentin 300 mg 3 times daily due to ?sedation on 7/18 AM      Uncontrolled hypertension  - Resume PTA losartan (7/19-xx).  - resume PTA metoprolol as above      COPD not on home oxygen  Breathing comfortably on RA  - resume PTA inhaler (fluticasone vilanterol)  - Bill PRN     Need for placement into a transitional care unit versus long-term care  Daughter Miles on phone and discussed that she is agreeable for placement for patient as she was unable to care for her at home due to her medical needs.   - Case management/social work consulted           Diet: Adult Formula Bolus Feeding: Jevity 1.5; Route: Gastrostomy; 5 times daily; Volume per Bolus: 1; Can(s)/ Carton(s); Rec giving as 1 carton @ [id number] or per pt preference  Room Service  Pureed Diet (level 4) Thin Liquids (level 0)    DVT Prophylaxis: Pneumatic Compression Devices  Rhodes Catheter: Not present  Fluids: None  Lines: None     Cardiac Monitoring: None  Code Status: Full Code      Clinically Significant  "Risk Factors              # Hypoalbuminemia: Lowest albumin = 3.3 g/dL at 7/18/2024  6:46 AM, will monitor as appropriate     # Hypertension: Noted on problem list             # DMII: A1C = 7.4 % (Ref range: <5.7 %) within past 6 months   # Obesity: Estimated body mass index is 35.22 kg/m  as calculated from the following:    Height as of this encounter: 1.651 m (5' 5\").    Weight as of this encounter: 96 kg (211 lb 10.3 oz).             Disposition Plan      Expected Discharge Date: 07/23/2024    Discharge Delays: *Medically Ready for Discharge  Placement - LTC  Destination: nursing home  Discharge Comments: Dispo: LTC facility          Joshua Mcgrath MD  Text Page      Hospitalist Service, GOLD TEAM 10  [facility]  Securely message with [employer] (more info)  Text page via [facility] Paging/Directory   See signed in provider for up to date coverage information  ______________________________________________________________________    Interval History   No acute events overnight. Nursing notes reviewed. Patient seen this AM during rounds.     Noted that Tylenol was given for headache and the patient slept most of the night.    Pt denies any new present complaints this AM during my visit.    Physical Exam   Vital Signs: Temp: 97.8  F (36.6  C) Temp src: Oral BP: (!) 141/80 Pulse: 56   Resp: 19 SpO2: 96 % O2 Device: None (Room air)    Weight: 211 lbs 10.27 oz    Constitutional: awake, alert, cooperative, no apparent distress, lying down on hospital bed.  HEENT: extra ocular muscles intact, sclera anicteric. Post-surgical changes of left-sided hemicraniectomy noted.  Respiratory: No increased work of breathing on room air, good air exchange, clear to auscultation bilaterally, no crackles or wheezing  Cardiovascular: RRR, normal S1 and S2, did not appreciate any murmur, rubs or gallops.  GI: normal bowel sounds, soft, non-distended, non-tender, PEG in place, c/d/I.  Skin: Warm and " dry. No lower extremity pitting edema. No suspicious rash on exposed skin.  Musculoskeletal: Right-sided hemiparesis.      Medical Decision Making       Please see A&P for additional details of medical decision making.      Data         Imaging results reviewed over the past 24 hrs:   No results found for this or any previous visit (from the past 24 hour(s)).

## 2024-07-22 NOTE — PLAN OF CARE
Assumed care [id number]     Events this shift: A&Ox3 (ex situation), forgetful. Generally more alert today. Improved appetite. AC BGs >200. Migraine continues. Constipation resolved with PRN senna. 1:1 supervision with meals. Purewick in place.     Plan: Awaiting LTC placement. Continue POC & notify providers with any acute changes.

## 2024-07-22 NOTE — PLAN OF CARE
Goal Outcome Evaluation:    Status: Admit for AMS  Vitals: afib, intermittently tachy on Tele, RA  Neuros: A+Ox3, disoriented to situation, slow to respond  IV: L PIV SL  Diet: Minced and moist, 1:1 supervision  GI/: No BM this shift, Purewick in place  Skin: No new skin concerns  Pain: c/o HA  Activity: Ax2/lift, R sided berhane-paralysis  Updates this shift: Tylenol given x1 for HA. Meds given through g-tube. On bolus feedings 5x per day. ! Bolus feeding given this morning at 0600. Pt slept most of the night. Pt refused repositioning intermittently throughout the night. Continue with current plan of care.

## 2024-07-23 LAB
BACTERIA BLD CULT: NO GROWTH
BACTERIA BLD CULT: NO GROWTH
GLUCOSE BLDC GLUCOMTR-MCNC: 149 MG/DL (ref 70–99)
GLUCOSE BLDC GLUCOMTR-MCNC: 235 MG/DL (ref 70–99)
GLUCOSE BLDC GLUCOMTR-MCNC: 283 MG/DL (ref 70–99)
GLUCOSE BLDC GLUCOMTR-MCNC: 306 MG/DL (ref 70–99)
GLUCOSE BLDC GLUCOMTR-MCNC: 317 MG/DL (ref 70–99)

## 2024-07-23 PROCEDURE — 120N000002 HC R&B MED SURG/OB UMMC

## 2024-07-23 PROCEDURE — 99232 SBSQ HOSP IP/OBS MODERATE 35: CPT | Performed by: HOSPITALIST

## 2024-07-23 PROCEDURE — 250N000013 HC RX MED GY IP 250 OP 250 PS 637: Performed by: STUDENT IN AN ORGANIZED HEALTH CARE EDUCATION/TRAINING PROGRAM

## 2024-07-23 PROCEDURE — 93010 ELECTROCARDIOGRAM REPORT: CPT | Performed by: INTERNAL MEDICINE

## 2024-07-23 PROCEDURE — 93005 ELECTROCARDIOGRAM TRACING: CPT

## 2024-07-23 RX ORDER — METOPROLOL TARTRATE 1 MG/ML
5 INJECTION, SOLUTION INTRAVENOUS EVERY 5 MIN PRN
Status: DISCONTINUED | OUTPATIENT
Start: 2024-07-23 | End: 2024-07-24 | Stop reason: HOSPADM

## 2024-07-23 RX ADMIN — INSULIN ASPART 2 UNITS: 100 INJECTION, SOLUTION INTRAVENOUS; SUBCUTANEOUS at 22:27

## 2024-07-23 RX ADMIN — METOPROLOL TARTRATE 100 MG: 50 TABLET, FILM COATED ORAL at 20:40

## 2024-07-23 RX ADMIN — LOSARTAN POTASSIUM 50 MG: 50 TABLET, FILM COATED ORAL at 09:23

## 2024-07-23 RX ADMIN — EMPAGLIFLOZIN 10 MG: 10 TABLET, FILM COATED ORAL at 09:22

## 2024-07-23 RX ADMIN — ATORVASTATIN CALCIUM 40 MG: 40 TABLET, FILM COATED ORAL at 20:40

## 2024-07-23 RX ADMIN — INSULIN GLARGINE 8 UNITS: 100 INJECTION, SOLUTION SUBCUTANEOUS at 22:27

## 2024-07-23 RX ADMIN — DILTIAZEM HYDROCHLORIDE 60 MG: 60 TABLET, FILM COATED ORAL at 06:21

## 2024-07-23 RX ADMIN — DILTIAZEM HYDROCHLORIDE 60 MG: 60 TABLET, FILM COATED ORAL at 22:27

## 2024-07-23 RX ADMIN — METOPROLOL TARTRATE 100 MG: 50 TABLET, FILM COATED ORAL at 09:23

## 2024-07-23 RX ADMIN — FLUTICASONE FUROATE AND VILANTEROL TRIFENATATE 1 PUFF: 100; 25 POWDER RESPIRATORY (INHALATION) at 09:24

## 2024-07-23 RX ADMIN — DIGOXIN 125 MCG: 125 TABLET ORAL at 09:23

## 2024-07-23 RX ADMIN — ACETAMINOPHEN 650 MG: 325 TABLET, FILM COATED ORAL at 22:48

## 2024-07-23 RX ADMIN — Medication 15 ML: at 09:23

## 2024-07-23 RX ADMIN — DILTIAZEM HYDROCHLORIDE 60 MG: 60 TABLET, FILM COATED ORAL at 14:46

## 2024-07-23 RX ADMIN — AMITRIPTYLINE HYDROCHLORIDE 10 MG: 10 TABLET, FILM COATED ORAL at 22:27

## 2024-07-23 ASSESSMENT — ACTIVITIES OF DAILY LIVING (ADL)
ADLS_ACUITY_SCORE: 70
ADLS_ACUITY_SCORE: 66
ADLS_ACUITY_SCORE: 70
ADLS_ACUITY_SCORE: 68
ADLS_ACUITY_SCORE: 70
ADLS_ACUITY_SCORE: 70
ADLS_ACUITY_SCORE: 66
ADLS_ACUITY_SCORE: 68
ADLS_ACUITY_SCORE: 68
ADLS_ACUITY_SCORE: 66
ADLS_ACUITY_SCORE: 70
ADLS_ACUITY_SCORE: 68
ADLS_ACUITY_SCORE: 66

## 2024-07-23 NOTE — PLAN OF CARE
"Blood pressure 126/75, pulse 82, temperature 98.2  F (36.8  C), temperature source Oral, resp. rate 18, height 1.651 m (5' 5\"), weight 96 kg (211 lb 10.3 oz), SpO2 97%. Via RA     Assumed care at 07-19:00    Patient alert and intermittent forgetful. Received scheduled medications via PEG tube , C/o headache and received Tylenol 650 mg  X 1 PRN.    received Bolus tube feeding per order and flushed with water before and after. Inct bladder and bowel movement . Pure wick in place, adequate urine output .  Left PIV Sl. Speech changed her diet to Pureed level 4 with supervision 1:1 . Call light within reach, bed alarm on . Continue with POC and update MD with change.                 "

## 2024-07-23 NOTE — PROGRESS NOTES
[facility]    Progress Note - Hospitalist Service, GOLD TEAM 10       Date of Admission:  7/16/2024    Assessment & Plan   Roya Burgos is a 69 year old female admitted on 7/16/2024 for evaluation of AMS. She has PMHx of recent ischemic left MCA stroke c/b cerebral edema s/p decompressive hemicraniectomy c/b hemorrhagic transformation 2/2 therapeutic anticoagulation for atrial fibrillation old complicated by dysarthria and significant right-sided berhane-paralysis, oropharyngeal dysphagia s/p percutaneous gastrostomy tube on tube feeding, chronic migraine headache, paroxysmal atrial fibrillation on aspirin only given brain bleeding, chronic heart failure with mildly reduced ejection fraction, hypertension, type 2 diabetes mellitus, hyperlipidemia, chronic kidney disease stage III A, L parotid gland nodule, Bilateral moderate carotid stenosis, and COPD not on home oxygen.  The patient had frequent hospitalization in [facility] and was recently discharged on 7/14/2024 for care at home with home visiting nurse service, but returned as daughter was unable to care for her at home. Hemodynamically and clinically stable.    Changes today 7/23:  -Today the patient appears and remains medically stable and ready for discharge, awaiting long-term care facility placement.  - Continue PT/OT.  - Previously: Family indicated understanding on holding percocet as a last resort for migraine headache.  - Confirmed placement for tomorrow, family aware per RNCC/SW team.    Migraine headache  Per chart review, patient has been on percocet (10-325mg TID) since 09/2014, but indications are unclear.  - hold PTA amitriptyline, noted to be sedated 7/18 AM > resume on 7/19  - tylenol PRN    Afib  Digoxin levels wnl. Pt w/ afib w/ RVR earlier on 7/17 w/out home meds ordered, otherwise HDS. Will resume home meds  - PTA metoprolol tartrate 100 mg BID, diltiazem 60 mgs Q8h, digoxin 125mcg  Daily   - telemetry  - pt not on AC 2/2 hemorrhagic stroke hx    AMS, resolved  Reported to be agitated and not herself. Labs obtained on admission thus far unrevealing (CXR negative, UA neg, digoxin level wnl, CT head neg). VBG w/ no hypercarbia, LA mildly elevated at 2.1, now 1.9 (7/18). Cdiff and enteric panel neg. Covid neg. CRP of 31 on admission and downtrending to 28 on 7/18. Ammonia wnl. Blood cultures pending. On our interview on 7/17, pt is conversant and does not appear altered. It appears that she has returned back to baseline.  -Holding off on antibiotics given lack of evidence of sepsis and lack of clear infectious source  -Neurochecks every 4 hours for the first 48 hours of admission  -Requested a pharmacy consult to review etiologies for acute toxic encephalopathy.              - No changes needed after review of meds.             Recent ischemic left MCA stroke c/b cerebral edema s/p decompressive hemicraniectomy c/b hemorrhagic transformation secondary to therapeutic anticoagulation  Dysarthria   Significant right-sided berhane-paralysis  oropharyngeal dysphagia s/p percutaneous gastrostomy tube on tube feeding  -Will resume aspirin once we ensure there is no active brain bleeding- pt reports not taking, and discharge summaries indicate discontinuation, will attempt verification w/ pharmacy   -Requested dietitian consult to resume tube feeding.  - SPL consulted.              - Initiate pureed (IDDSI 4) diet and thin liquids (7/17) and advanced to IDDSI 5 (7/18) with thin liquids.              - SLP will follow for dysphagia management and trialing advanced solids when pt's dentures are present.  -PTA Atorvastatin.  -The patient will need neurosurgery follow-up in 6 weeks based on documentation from prior hospital (appears to be scheduled August 2024)     Acute kidney injury, akin stage I on top of CKD stage III, resolved   Cr baseline 1-1.5 (2024) and Cr 1.5 on admission. BUN/Cr ~19, likely prerenal.  Now improved on 7/18 with Cr 1.08 and BUN 17.5.  - Strict intake and output and daily body weight  - Resume PTA losartan given stable Cr on 7/19.     Chronic heart failure with mildly reduced ejection fraction LVEF 46%TTE from an outside hospital (6/6/24):  1. Limited echo.  2. Mild LV enlargement with mildly increased wall thickness. Calculated biplane LVEF 46%. Mild global hypokinesis. No LV thrombus.  3. Mild RV enlargement with mildly decreased systolic function. Estimated PASP 25 mmHg + RA Pressure.   -Consider switching metoprolol tartrate to metoprolol succinate prior to discharge for HF   -Consider switching losartan to valsartan once acute kidney injury resolves then eventually initiation of Entresto  - resumed PTA empagliflozin     T2DM, Uncontrolled (7/15/2024 @ 7.4)  Hemoglobin A1c at [facility] was 9.2 on 4/4/2024. Repeat on 7/15 7.4   - resume PTA empagloflozin, lantus 8 units  - Started insulin NovoLog coverage  - Will hold off on metformin in setting of acute kidney injury  - Holding PTA gabapentin 300 mg 3 times daily due to ?sedation on 7/18 AM      Uncontrolled hypertension  - Resume PTA losartan (7/19-xx).  - resume PTA metoprolol as above      COPD not on home oxygen  Breathing comfortably on RA  - resume PTA inhaler (fluticasone vilanterol)  - Duonebs PRN     Need for placement into a transitional care unit versus long-term care  Daughter Miles on phone and discussed that she is agreeable for placement for patient as she was unable to care for her at home due to her medical needs.   - Case management/social work consulted           Diet: Adult Formula Bolus Feeding: Jevity 1.5; Route: Gastrostomy; 5 times daily; Volume per Bolus: 1; Can(s)/ Carton(s); Rec giving as 1 carton @ [phone] or per pt preference  Room Service  Pureed Diet (level 4) Thin Liquids (level 0)    DVT Prophylaxis: Pneumatic Compression Devices  Rhodes Catheter: Not present  Fluids: None  Lines: None     Cardiac  "Monitoring: None  Code Status: Full Code      Clinically Significant Risk Factors              # Hypoalbuminemia: Lowest albumin = 3.3 g/dL at 7/18/2024  6:46 AM, will monitor as appropriate     # Hypertension: Noted on problem list             # DMII: A1C = 7.4 % (Ref range: <5.7 %) within past 6 months   # Obesity: Estimated body mass index is 35.22 kg/m  as calculated from the following:    Height as of this encounter: 1.651 m (5' 5\").    Weight as of this encounter: 96 kg (211 lb 10.3 oz).             Disposition Plan      Expected Discharge Date: 07/24/2024,  3:00 PM  Discharge Delays: *Medically Ready for Discharge  Destination: nursing home  Discharge Comments: Dispo: [facility]. Transport scheudled for 7/24 [id number]        The patient's care was discussed with the RN and RNCC Team.      Joshua Mcgrath MD  Text Page      Hospitalist Service, GOLD TEAM 10  [facility]  Securely message with [employer]oc[employer] (more info)  Text page via [employer] Paging/Directory   See signed in provider for up to date coverage information  ______________________________________________________________________    Interval History   No acute events overnight. Nursing notes reviewed. Patient seen this AM during rounds.     Up to chair, no complaints and pt denies any new present complaints this AM during my visit.    Physical Exam   Vital Signs: Temp: 98  F (36.7  C) Temp src: Oral BP: 129/68 Pulse: 69   Resp: 16   O2 Device: None (Room air)    Weight: 211 lbs 10.27 oz    Constitutional: awake, alert, cooperative, no apparent distress, lying down on hospital bed.  HEENT: extra ocular muscles intact, sclera anicteric. Post-surgical changes of left-sided hemicraniectomy noted.  Respiratory: No increased work of breathing on room air, good air exchange, clear to auscultation bilaterally, no crackles or wheezing  Cardiovascular: RRR, normal S1 and S2, did not appreciate any murmur, " rubs or gallops.  GI: normal bowel sounds, soft, non-distended, non-tender, PEG in place, c/d/I.  Skin: Warm and dry. No lower extremity pitting edema. No suspicious rash on exposed skin.  Musculoskeletal: Right-sided hemiparesis.      Medical Decision Making       Please see A&P for additional details of medical decision making.      Data         Imaging results reviewed over the past 24 hrs:   No results found for this or any previous visit (from the past 24 hour(s)).

## 2024-07-23 NOTE — PROGRESS NOTES
Pt's declined breakfast but allowed this writer to run TF. All am med given via G tube. Primary team was paged to update daughter.

## 2024-07-23 NOTE — PLAN OF CARE
Goal Outcome Evaluation:       Pt has been alert and oriented X3, VSS on RA, denied any pain, afebrile. Pt has a Pureed diet but has been refusing to eat at meal times. Bolus TF given per order. Pt had 3 cans of TF so far this shift. All med crushed and given via G tube. Daughter has been very frustrated, calling multiple times today. Per SW, pt has a bed available to [facility] and has a ride scheduled for tomorrow. Nutrition serv was notified to give pt 3 days worth of TF prior to d/c

## 2024-07-23 NOTE — PLAN OF CARE
Hours of care [id number]    Neuro: A&Ox3. Disoriented to situation. Intermittently confused. R sided weakness (new baseline). Flat affect.  Cardiac: VSS.   Respiratory: Sating >92% on RA.  GI/: Adequate urine output via purewick.  Diet/appetite: Bolus TF 5x/day. Minced and moist diet with 1:1 supervision.   Activity:  Assist of 2 w/ lift.  Pain: At acceptable level on current regimen.   Skin: No new deficits noted.  LDA's: L PIV: SL. PEG.    Plan: Continue with POC. Notify primary team with changes.

## 2024-07-23 NOTE — PROGRESS NOTES
Care Management Follow Up    Length of Stay (days): 7    Expected Discharge Date: 07/25/2024     Concerns to be Addressed: all concerns addressed in this encounter     Patient plan of care discussed at interdisciplinary rounds: Yes    Anticipated Discharge Disposition: Long Term Care, Skilled Nursing Facility     Anticipated Discharge Services: None  Anticipated Discharge DME: None    Patient/family educated on Medicare website which has current facility and service quality ratings: yes  Education Provided on the Discharge Plan: Yes  Patient/Family in Agreement with the Plan: yes    Referrals Placed by CM/SW: Internal Clinic Care Coordination, Communication hand-offs to next level of Care Providers    Accepted  [facility]  [address] [place] [address]  P: [phone]  F: [phone]  - Can accept tomorrow 7/24    Pending  [place] [address] [address] [facility] [address]  P: [phone]  F: [phone]  7/17: Initial referral sent via Epic  7/23: Admissions is reviewing      [address] [address]  P: [phone]  F: [phone]  7/19: Referral resent via [employer]  7/23: SW left a VM and requested a callback      [address] [address]  P: [phone]  F: [phone]  7/19: Initial referral sent via Epic  7/23: SW left a VM and requested a callback     [facility]   [address] [place] [address]  P: [phone]  F: [phone]  7/23: Initial referral sent via [employer] [facility]  Liaison: Anny Trujillo  P: [phone]  F: [phone]  [email or web address]  - [facility]  - [facility]  7/23: Initial referral sent via Epic     Declined  [facility]: Bed not available   [facility]: Bed not available   [facility]: Acuity too high  [facility]: Bed not available   [facility]: Bed not available   [facility]: Bed not available   [facility]: Bed not available, can't meet patient's needs.   [facility]; Bed not available, cannot meet patient's needs, payer/insurance denied or not accepted  [facility]: Bed not available   [facility]: Acuity too high, fall risk  [facility]: Bed not available   [facility]: Facility full    Private pay costs discussed: Not applicable    Additional Information:  Followed up on remaining referrals and sent additional LTC referrals.    Addendum 1400  The patient was accepted to [facility]. JOS called the facility who requested the patient be sent with at least 3 days of TF supplies.    JOS called the patient's daughter Miles and provided an update. She stated she is pleased that the facility is near her house and she plans to go look at the facility today. JOS stated the plan will be to discharge tomorrow afternoon. Miles denied having any questions or concerns at this time.    JOS met with the patient at bedside and provided an update. Patient denied having any questions or concerns at this time.    JOS called [facility] and scheduled stretcher transportation for tomorrow 7/24 with a pickup window of [phone]    PCS form was completed, faxed to billing, and placed in the patient's chart.     JOS updated the care team     SW will continue to follow for discharge needs.    [id number]    CHHAYA Lopez  [address]   Office: [phone]  [email or web address]  Securely message with [employer] (Search Name or 7C Med Surg [phone] JOS)  Text page via [facility] Paging/Directory   See signed in provider for up to date coverage information  Social Work & Care Management Department

## 2024-07-23 NOTE — PROGRESS NOTES
CLINICAL NUTRITION SERVICES - REASSESSMENT NOTE     Nutrition Prescription    RECOMMENDATIONS FOR MDs/PROVIDERS TO ORDER:  Monitor/adjust insulin regimen PRN    Malnutrition Status:    Patient does not meet two of the established criteria necessary for diagnosing malnutrition      Recommendations already ordered by Registered Dietitian (RD):  For EN:  Goal TF regimen: 5 cartons of Jevity 1.5 keeley (or equivalent) will provide: 1185 mL formula, 1778 Kcals (26 Kcal/kg), 76 gm protein (1.1 gm/kg), 256 gm CHO, 25 gm fiber, and 901 mL free water from TFs. Rec giving as 1 carton @ [id number] or per pt preference  Free water flushes:  60 mL before and after each bolus feed for FT patency     Encourage PO intake as tolerated. Would not reduce TF's at this time until pt consistently demonstrating tolerance to PO intake.     Future/Additional Recommendations:  Monitor diet progression/adjust TF accordingly  Monitor TF tolerance/adequacy  Monitor nutrition related findings and follow up per protocol     EVALUATION OF THE PROGRESS TOWARD GOALS   Diet: IDDSI L4 Pureed and IDDSI L0 Thin Liquids, room service appropriate with assist    Intake/Tolerance: Patient consuming 0-25 % of 3 meal(s) daily. 4 day average tube feeding infusion of 1067 mL (90 % of goal volume)     NEW FINDINGS   Room visit. Pt sleeping at time of visit and did not wake to knocks names. Per RN, pt refused breakfast but was agreeable to TF's. Per RN, no concerns/issues with tolerance to TF's at this time. Pt on room service assist. Minimal intakes documented of 0-25% of meals. Applesauce noted at bedside. Suspect inaccuracy or error in documented weights, will continue to monitor.     GI:  Last BM: 07/21/24    Weight:  Most Recent Weight: 96 kg (211 lb 10.3 oz)  on 7/22/24 via Bed scale  Body mass index is 35.22 kg/m .  Wt down 11.5 kg from admission. Suspect inaccuracy in weights . One weight missing source.    Meds:  Lipitor  Insulin  Liquid MVI +  minerals      Labs:  POC glu [id number]    Skin:  reviewed    MALNUTRITION  % Intake:  TF dependent + PO  % Weight Loss: Weight loss does not meet criteria for malnutrition   Subcutaneous Fat Loss: None observed   Muscle Loss: Temporal:  mild and Posterior calf:  mild  Fluid Accumulation/Edema: None noted  Malnutrition Diagnosis: Patient does not meet two of the established criteria necessary for diagnosing malnutrition    Previous Goals   Total avg nutritional intake to meet a minimum of 25 kcal/kg and 1 g PRO/kg daily (per dosing wt 69 kg).   Evaluation: Not met but improving since last assessment    Previous Nutrition Diagnosis  Inadequate oral intake related to dysphagia as evidenced by TF to help meet nutritional needs   Evaluation: No change    CURRENT NUTRITION DIAGNOSIS  Inadequate oral intake related to dysphagia as evidenced by TF to help meet nutritional needs     INTERVENTIONS  Implementation  Collaboration with other providers  Enteral Nutrition - continue      Goals  Total avg nutritional intake to meet a minimum of 25 kcal/kg and 1 g PRO/kg daily (per dosing wt 69 kg).     Monitoring/Evaluation  Progress toward goals will be monitored and evaluated per protocol.    Lucy Henriquez MS, RD, LD, CNSC    6C (beds 7900-3371) + 7C (beds 7579-0452) + ED   Available in [facility] by name or unit dietitian

## 2024-07-24 ENCOUNTER — DOCUMENTATION ONLY (OUTPATIENT)
Dept: GERIATRICS | Facility: CLINIC | Age: 69
End: 2024-07-24
Payer: COMMERCIAL

## 2024-07-24 VITALS
WEIGHT: 211.64 LBS | RESPIRATION RATE: 18 BRPM | TEMPERATURE: 98 F | SYSTOLIC BLOOD PRESSURE: 136 MMHG | HEIGHT: 65 IN | HEART RATE: 95 BPM | BODY MASS INDEX: 35.26 KG/M2 | OXYGEN SATURATION: 96 % | DIASTOLIC BLOOD PRESSURE: 70 MMHG

## 2024-07-24 LAB
ANION GAP SERPL CALCULATED.3IONS-SCNC: 13 MMOL/L (ref 7–15)
ATRIAL RATE - MUSE: NORMAL BPM
BUN SERPL-MCNC: 27.9 MG/DL (ref 8–23)
CALCIUM SERPL-MCNC: 9.5 MG/DL (ref 8.8–10.4)
CHLORIDE SERPL-SCNC: 100 MMOL/L (ref 98–107)
CREAT SERPL-MCNC: 1.11 MG/DL (ref 0.51–0.95)
DIASTOLIC BLOOD PRESSURE - MUSE: NORMAL MMHG
DIGOXIN SERPL-MCNC: 1 NG/ML (ref 0.6–2)
EGFRCR SERPLBLD CKD-EPI 2021: 54 ML/MIN/1.73M2
GLUCOSE BLDC GLUCOMTR-MCNC: 179 MG/DL (ref 70–99)
GLUCOSE BLDC GLUCOMTR-MCNC: 264 MG/DL (ref 70–99)
GLUCOSE BLDC GLUCOMTR-MCNC: 268 MG/DL (ref 70–99)
GLUCOSE SERPL-MCNC: 169 MG/DL (ref 70–99)
HCO3 SERPL-SCNC: 25 MMOL/L (ref 22–29)
INTERPRETATION ECG - MUSE: NORMAL
MAGNESIUM SERPL-MCNC: 2.6 MG/DL (ref 1.7–2.3)
P AXIS - MUSE: NORMAL DEGREES
PHOSPHATE SERPL-MCNC: 5.1 MG/DL (ref 2.5–4.5)
POTASSIUM SERPL-SCNC: 4.5 MMOL/L (ref 3.4–5.3)
PR INTERVAL - MUSE: NORMAL MS
QRS DURATION - MUSE: 138 MS
QT - MUSE: 326 MS
QTC - MUSE: 456 MS
R AXIS - MUSE: -63 DEGREES
SODIUM SERPL-SCNC: 138 MMOL/L (ref 135–145)
SYSTOLIC BLOOD PRESSURE - MUSE: NORMAL MMHG
T AXIS - MUSE: 82 DEGREES
VENTRICULAR RATE- MUSE: 118 BPM

## 2024-07-24 PROCEDURE — 80162 ASSAY OF DIGOXIN TOTAL: CPT | Performed by: HOSPITALIST

## 2024-07-24 PROCEDURE — 83735 ASSAY OF MAGNESIUM: CPT | Performed by: HOSPITALIST

## 2024-07-24 PROCEDURE — 36415 COLL VENOUS BLD VENIPUNCTURE: CPT | Performed by: HOSPITALIST

## 2024-07-24 PROCEDURE — 250N000013 HC RX MED GY IP 250 OP 250 PS 637: Performed by: STUDENT IN AN ORGANIZED HEALTH CARE EDUCATION/TRAINING PROGRAM

## 2024-07-24 PROCEDURE — 82374 ASSAY BLOOD CARBON DIOXIDE: CPT | Performed by: HOSPITALIST

## 2024-07-24 PROCEDURE — 84100 ASSAY OF PHOSPHORUS: CPT | Performed by: HOSPITALIST

## 2024-07-24 PROCEDURE — 99239 HOSP IP/OBS DSCHRG MGMT >30: CPT | Performed by: HOSPITALIST

## 2024-07-24 RX ORDER — INSULIN LISPRO 100 [IU]/ML
1-3 INJECTION, SOLUTION INTRAVENOUS; SUBCUTANEOUS
Qty: 10 ML | Refills: 0 | Status: SHIPPED | OUTPATIENT
Start: 2024-07-24

## 2024-07-24 RX ORDER — DILTIAZEM HCL 60 MG
60 TABLET ORAL EVERY 8 HOURS
Qty: 90 TABLET | Refills: 0 | Status: SHIPPED | OUTPATIENT
Start: 2024-07-24 | End: 2024-08-26

## 2024-07-24 RX ORDER — AMITRIPTYLINE HYDROCHLORIDE 10 MG/1
10 TABLET ORAL AT BEDTIME
Qty: 30 TABLET | Refills: 0 | Status: SHIPPED | OUTPATIENT
Start: 2024-07-24 | End: 2024-08-26

## 2024-07-24 RX ORDER — IPRATROPIUM BROMIDE AND ALBUTEROL SULFATE 2.5; .5 MG/3ML; MG/3ML
3 SOLUTION RESPIRATORY (INHALATION) EVERY 4 HOURS PRN
Qty: 90 ML | Refills: 0 | Status: SHIPPED | OUTPATIENT
Start: 2024-07-24 | End: 2024-08-26

## 2024-07-24 RX ORDER — INSULIN LISPRO 100 [IU]/ML
1-3 INJECTION, SOLUTION INTRAVENOUS; SUBCUTANEOUS AT BEDTIME
Qty: 10 ML | Refills: 0 | Status: SHIPPED | OUTPATIENT
Start: 2024-07-24

## 2024-07-24 RX ORDER — ALBUTEROL SULFATE 90 UG/1
2 AEROSOL, METERED RESPIRATORY (INHALATION) EVERY 4 HOURS PRN
Qty: 18 G | Refills: 0 | Status: SHIPPED | OUTPATIENT
Start: 2024-07-24

## 2024-07-24 RX ORDER — DIGOXIN 125 MCG
125 TABLET ORAL DAILY
Qty: 30 TABLET | Refills: 0 | Status: SHIPPED | OUTPATIENT
Start: 2024-07-24 | End: 2024-08-26

## 2024-07-24 RX ORDER — LOSARTAN POTASSIUM 50 MG/1
50 TABLET ORAL DAILY
Qty: 30 TABLET | Refills: 0 | Status: SHIPPED | OUTPATIENT
Start: 2024-07-24 | End: 2024-08-26

## 2024-07-24 RX ORDER — METOPROLOL TARTRATE 100 MG
100 TABLET ORAL 2 TIMES DAILY
Qty: 60 TABLET | Refills: 0 | Status: SHIPPED | OUTPATIENT
Start: 2024-07-24 | End: 2024-08-26

## 2024-07-24 RX ORDER — ATORVASTATIN CALCIUM 40 MG/1
40 TABLET, FILM COATED ORAL EVERY EVENING
Qty: 30 TABLET | Refills: 0 | Status: SHIPPED | OUTPATIENT
Start: 2024-07-24 | End: 2024-08-26

## 2024-07-24 RX ORDER — GUAIFENESIN 600 MG/1
15 TABLET, EXTENDED RELEASE ORAL DAILY
Qty: 450 ML | Refills: 0 | Status: SHIPPED | OUTPATIENT
Start: 2024-07-24 | End: 2024-08-23

## 2024-07-24 RX ORDER — ACETAMINOPHEN 325 MG/1
650 TABLET ORAL EVERY 4 HOURS PRN
Qty: 60 TABLET | Refills: 0 | Status: SHIPPED | OUTPATIENT
Start: 2024-07-24 | End: 2024-08-23

## 2024-07-24 RX ADMIN — EMPAGLIFLOZIN 10 MG: 10 TABLET, FILM COATED ORAL at 09:16

## 2024-07-24 RX ADMIN — METOPROLOL TARTRATE 100 MG: 50 TABLET, FILM COATED ORAL at 09:16

## 2024-07-24 RX ADMIN — ACETAMINOPHEN 650 MG: 325 TABLET, FILM COATED ORAL at 09:24

## 2024-07-24 RX ADMIN — DILTIAZEM HYDROCHLORIDE 60 MG: 60 TABLET, FILM COATED ORAL at 06:22

## 2024-07-24 RX ADMIN — Medication 15 ML: at 09:16

## 2024-07-24 RX ADMIN — DIGOXIN 125 MCG: 125 TABLET ORAL at 09:16

## 2024-07-24 RX ADMIN — LOSARTAN POTASSIUM 50 MG: 50 TABLET, FILM COATED ORAL at 09:16

## 2024-07-24 ASSESSMENT — ACTIVITIES OF DAILY LIVING (ADL)
ADLS_ACUITY_SCORE: 70
ADLS_ACUITY_SCORE: 70
ADLS_ACUITY_SCORE: 66
ADLS_ACUITY_SCORE: 70
ADLS_ACUITY_SCORE: 70
ADLS_ACUITY_SCORE: 66
ADLS_ACUITY_SCORE: 66
ADLS_ACUITY_SCORE: 70
ADLS_ACUITY_SCORE: 66
ADLS_ACUITY_SCORE: 64
ADLS_ACUITY_SCORE: 66
ADLS_ACUITY_SCORE: 70
ADLS_ACUITY_SCORE: 66

## 2024-07-24 NOTE — PROGRESS NOTES
Care Management Discharge Note    Discharge Date: 07/24/2024       Discharge Disposition: Long Term Care, Skilled Nursing Facility    Discharge Services: None    Discharge DME: None    Discharge Transportation: [facility] - Stretcher [phone]    Private pay costs discussed: transportation costs    Does the patient's insurance plan have a 3 day qualifying hospital stay waiver?  No    PAS Confirmation Code: [id number]  Patient/family educated on Medicare website which has current facility and service quality ratings: yes    Education Provided on the Discharge Plan: Yes  Persons Notified of Discharge Plans: Patient, patient's family, facility, provider, nursing staff, SW  Patient/Family in Agreement with the Plan: yes    Handoff Referral Completed: Yes    Additional Information:  Patient will discharge to [facility] with stretcher transportation arranged through [facility] with a pickup window of [phone]    JOS received a call from [facility] in admissions stating that they need orders by 0940 ruperto they will ask discharge be pushed back.  JOS reported this information to primary medicine provider Dr. Mcgrath    @08 JOS sent discharge orders to the facility via [employer] in basket.  Hard scripts placed in the patient's chart to be sent to the facility with her.     [facility]  [address] [place] [address]  P: [phone]  F: [phone]    CHHAYA Lopez  [address]   Office: [phone]  [email or web address]  Securely message with [employer] (Search Name or 7C Med Surg [phone] )  Text page via [facility] Paging/Directory   See signed in provider for up to date coverage information  Social Work & Care Management Department

## 2024-07-24 NOTE — DISCHARGE SUMMARY
"[facility]  Hospitalist Discharge Summary      Date of Admission:  7/16/2024  Date of Discharge:  7/24/2024  Discharging Provider: Joshua Mcgrath MD  Discharge Service: Hospitalist Service, GOLD TEAM 10    Discharge Diagnoses     Chronic migraine headache  Chronic A-fib  Altered mental status, resolved  Recent ischemic left MCA stroke complicated by cerebral edema status post decompressive Yogesh craniectomy was complicated by hemorrhagic transformation secondary to therapeutic anticoagulation  Dysarthria  Significant right-sided paralysis  Oropharyngeal dysphagia status post PEG tube and is on tube feeding  Chronic heart failure with mildly reduced EF, no exacerbation  Type 2 diabetes uncontrolled  Uncontrolled hypertension  COPD not on home oxygen  Unable to perform self cares and requires long-term care placement    Clinically Significant Risk Factors     # DMII: A1C = 7.4 % (Ref range: <5.7 %) within past 6 months  # Obesity: Estimated body mass index is 35.22 kg/m  as calculated from the following:    Height as of this encounter: 1.651 m (5' 5\").    Weight as of this encounter: 96 kg (211 lb 10.3 oz).       Follow-ups Needed After Discharge   Follow-up Appointments     Follow Up and recommended labs and tests      Follow up with Nursing home physician.  No follow up labs or test are   needed.        {Additional follow-up instructions/to-do's for PCP    : Medications, goals of cares    Unresulted Labs Ordered in the Past 30 Days of this Admission       No orders found from 6/16/2024 to 7/17/2024.        These results will be followed up    Discharge Disposition   Discharged to home Long-term care facility  Condition at discharge: Stable    Hospital Course   Roya Burgos is a 69 year old female admitted on 7/16/2024 for evaluation of AMS. She has PMHx of recent ischemic left MCA stroke c/b cerebral edema s/p decompressive hemicraniectomy c/b hemorrhagic transformation " 2/2 therapeutic anticoagulation for atrial fibrillation old complicated by dysarthria and significant right-sided berhane-paralysis, oropharyngeal dysphagia s/p percutaneous gastrostomy tube on tube feeding, chronic migraine headache, paroxysmal atrial fibrillation on aspirin only given brain bleeding, chronic heart failure with mildly reduced ejection fraction, hypertension, type 2 diabetes mellitus, hyperlipidemia, chronic kidney disease stage III A, L parotid gland nodule, Bilateral moderate carotid stenosis, and COPD not on home oxygen.  The patient had frequent hospitalization in [facility] and was recently discharged on 7/14/2024 for care at home with home visiting nurse service, but returned as daughter was unable to care for her at home. Hemodynamically and clinically stable.    Changes today   -Appropriate for discharge to long-term care and medication reconciliation confirmed and completed prior to discharge.    Migraine headache  Per chart review, patient has been on percocet (10-325mg TID) since 09/2014, but indications are unclear.  - hold PTA amitriptyline, noted to be sedated 7/18 AM > resume on 7/19  - tylenol PRN    Afib  Digoxin levels wnl. Pt w/ afib w/ RVR earlier on 7/17 w/out home meds ordered, otherwise HDS. Will resume home meds  - PTA metoprolol tartrate 100 mg BID, diltiazem 60 mgs Q8h, digoxin 125mcg Daily   - telemetry  - pt not on AC 2/2 hemorrhagic stroke hx    AMS, resolved  Reported to be agitated and not herself. Labs obtained on admission thus far unrevealing (CXR negative, UA neg, digoxin level wnl, CT head neg). VBG w/ no hypercarbia, LA mildly elevated at 2.1, now 1.9 (7/18). Cdiff and enteric panel neg. Covid neg. CRP of 31 on admission and downtrending to 28 on 7/18. Ammonia wnl. Blood cultures pending. On our interview on 7/17, pt is conversant and does not appear altered. It appears that she has returned back to baseline.  -Holding off on antibiotics given lack of  evidence of sepsis and lack of clear infectious source  -Neurochecks every 4 hours for the first 48 hours of admission  -Requested a pharmacy consult to review etiologies for acute toxic encephalopathy.              - No changes needed after review of meds.             Recent ischemic left MCA stroke c/b cerebral edema s/p decompressive hemicraniectomy c/b hemorrhagic transformation secondary to therapeutic anticoagulation  Dysarthria   Significant right-sided berhane-paralysis  oropharyngeal dysphagia s/p percutaneous gastrostomy tube on tube feeding  -Will resume aspirin once we ensure there is no active brain bleeding- pt reports not taking, and discharge summaries indicate discontinuation, will attempt verification w/ pharmacy   -Requested dietitian consult to resume tube feeding.  - SPL consulted.              - Initiate pureed (IDDSI 4) diet and thin liquids (7/17) and advanced to IDDSI 5 (7/18) with thin liquids.              - SLP will follow for dysphagia management and trialing advanced solids when pt's dentures are present.  -PTA Atorvastatin.  -The patient will need neurosurgery follow-up in 6 weeks based on documentation from prior hospital (appears to be scheduled August 2024)     Acute kidney injury, akin stage I on top of CKD stage III, resolved   Cr baseline 1-1.5 (2024) and Cr 1.5 on admission. BUN/Cr ~19, likely prerenal. Now improved on 7/18 with Cr 1.08 and BUN 17.5.  - Strict intake and output and daily body weight  - Resume PTA losartan given stable Cr on 7/19.     Chronic heart failure with mildly reduced ejection fraction LVEF 46%TTE from an outside hospital (6/6/24):  1. Limited echo.  2. Mild LV enlargement with mildly increased wall thickness. Calculated biplane LVEF 46%. Mild global hypokinesis. No LV thrombus.  3. Mild RV enlargement with mildly decreased systolic function. Estimated PASP 25 mmHg + RA Pressure.   -Consider switching metoprolol tartrate to metoprolol succinate prior to  discharge for HF   -Consider switching losartan to valsartan once acute kidney injury resolves then eventually initiation of Entresto  - resumed PTA empagliflozin     T2DM, Uncontrolled (7/15/2024 @ 7.4)  [facility]. Repeat on 7/15 7.4   - resume PTA empagloflozin, lantus 8 units  - Started insulin NovoLog coverage  - Will hold off on metformin in setting of acute kidney injury  - Holding PTA gabapentin 300 mg 3 times daily due to ?sedation on 7/18 AM      Uncontrolled hypertension  - Resume PTA losartan (7/19-xx).  - resume PTA metoprolol as above      COPD not on home oxygen  Breathing comfortably on RA  - resume PTA inhaler (fluticasone vilanterol)  - Bill PRN     Need for placement into a transitional care unit versus long-term care  Daughter Miles on phone and discussed that she is agreeable for placement for patient as she was unable to care for her at home due to her medical needs.   - Case management/social work consulted       Consultations This Hospital Stay   CARE MANAGEMENT / SOCIAL WORK IP CONSULT  NUTRITION SERVICES ADULT IP CONSULT  PHARMACY IP CONSULT  NUTRITION SERVICES ADULT IP CONSULT  PHARMACY IP CONSULT  SPEECH LANGUAGE PATH ADULT IP CONSULT  PHARMACY IP CONSULT  MEDICATION HISTORY IP PHARMACY CONSULT  PHYSICAL THERAPY ADULT IP CONSULT  OCCUPATIONAL THERAPY ADULT IP CONSULT  CARE MANAGEMENT / SOCIAL WORK IP CONSULT    Code Status   Full Code    Time Spent on this Encounter   I, Joshua Mcgrath MD, personally saw the patient today and spent greater than 30 minutes discharging this patient.       Joshua Mcgrath MD  [address] MED SURG  [address] [facility] [address]  Phone: [phone]  ______________________________________________________________________    Physical Exam   Vital Signs: Temp: 98  F (36.7  C) Temp src: Oral BP: (!) 153/67 Pulse: 95   Resp: 16 SpO2: 96 % O2 Device: None (Room air)    Weight: 211 lbs 10.27 oz    Alert awake  and at baseline mentation in no apparent distress.       Primary Care Physician   Lolita King    Discharge Orders      General info for SNF    Length of Stay Estimate: Long Term Care  Condition at Discharge: Stable  Level of care:board and care  Rehabilitation Potential: Fair  Admission H&P remains valid and up-to-date: YES  Recent Chemotherapy: N/A  Use Nursing Home Standing Orders: Yes     Mantoux instructions    Give two-step Mantoux (PPD) Per Facility Policy Yes     Follow Up and recommended labs and tests    Follow up with Nursing home physician.  No follow up labs or test are needed.     Reason for your hospital stay    69 year old female admitted on 7/16/2024 for evaluation of AMS. She has PMHx of recent ischemic left MCA stroke c/b cerebral edema s/p decompressive hemicraniectomy c/b hemorrhagic transformation 2/2 therapeutic anticoagulation for atrial fibrillation old complicated by dysarthria and significant right-sided berhane-paralysis, oropharyngeal dysphagia s/p percutaneous gastrostomy tube on tube feeding, chronic migraine headache, paroxysmal atrial fibrillation on aspirin only given brain bleeding, chronic heart failure with mildly reduced ejection fraction, hypertension, type 2 diabetes mellitus, hyperlipidemia, chronic kidney disease stage III A, L parotid gland nodule, Bilateral moderate carotid stenosis, and COPD not on home oxygen.  The patient had frequent hospitalization in [facility] and was recently discharged on 7/14/2024 for care at home with home visiting nurse service, but returned as daughter was unable to care for her at home. Hemodynamically and clinically stable.    Medically ready for a LTC placement     Activity - Up with nursing assistance     Full Code     Fall precautions     Diet    Follow this diet upon discharge: Orders Placed This Encounter      Adult Formula Bolus Feeding: Jevity 1.5; Route: Gastrostomy; 5 times daily; Volume per Bolus: 1; Can(s)/  Oh(s); Rec giving as 1 carton @ [id number] or per pt preference      Room Service      Pureed Diet (level 4) Thin Liquids (level 0)       Significant Results and Procedures   Results for orders placed or performed during the hospital encounter of 07/16/24   XR Chest Port 1 View    Narrative    EXAM: XR CHEST PORT 1 VIEW  LOCATION: [facility]  DATE: 7/16/2024    INDICATION: subjective fever, cough, post stroke, high aspiration risk  COMPARISON: Chest radiograph 6/8/2024      Impression    IMPRESSION: Low lung volumes with bronchovascular crowding. No focal consolidation, pleural effusion or pneumothorax. Cardiomediastinal silhouette is unremarkable.   CT Head w/o Contrast    Narrative    EXAM: CT HEAD W/O CONTRAST  LOCATION: [facility]  DATE: 7/16/2024    INDICATION: Change of mental status, recent left MCA s/p carniotomy with bleeding.  COMPARISON: 7/13/2024.  TECHNIQUE: Routine CT Head without IV contrast. Multiplanar reformats. Dose reduction techniques were used.    FINDINGS:  INTRACRANIAL CONTENTS: Ischemic changes in the left cerebral hemisphere with overlying craniectomy. Small chronic lacunar infarct in the left cerebellar hemisphere again noted. No CT evidence of acute infarct. Normal parenchymal attenuation. Normal   ventricles and sulci.     VISUALIZED ORBITS/SINUSES/MASTOIDS: No intraorbital abnormality. No paranasal sinus mucosal disease. No middle ear or mastoid effusion.    BONES/SOFT TISSUES: No acute abnormality. Indeterminate left parotid nodule again noted.      Impression    IMPRESSION:  1.  No acute intracranial abnormality or significant change compared to the prior study.       Discharge Medications   Discharge Medication List as of 7/24/2024 11:35 AM        START taking these medications    Details   !! acetaminophen (TYLENOL) 325 MG tablet Take 2 tablets (650 mg) by mouth every 4  hours as needed for mild pain or other (and adjunct with moderate or severe pain or per patient request), Disp-60 tablet, R-0, Local Print      !! insulin lispro (HUMALOG) 100 UNIT/ML vial Inject 1-3 Units subcutaneously 3 times daily (before meals) Correction Scale - LOW INSULIN RESISTANCE DOSING Do Not give Correction Insulin if Pre-Meal BG less than 140. For Pre-Meal  - 239 give 1 unit. For Pre-Meal  - 339 give 2 units. For  Pre-Meal BG greater than or equal to 340 give 3 units. To be given with prandial insulin, and based on pre-meal blood glucose. Administering insulin within 5 minutes of the start of the meal is ideal. Administer insulin no more than 30 minutes after the  start of the meal, unless directed otherwise by provider. Notify provider if glucose greater than or equal to 350 mg/dL after administration of correction dose., Disp-10 mL, R-0, Local Print      !! insulin lispro (HUMALOG) 100 UNIT/ML vial Inject 1-3 Units subcutaneously at bedtime LOW INSULIN RESISTANCE DOSING Do Not give Bedtime Correction Insulin if BG less than 200. For  - 299 give 1 unit. For  - 399 give 2 units For BG greater than or equal 400 give 3 units. Notify provide r if glucose greater than or equal to 350 mg/dL after administration of correction dose, Disp-10 mL, R-0, Local Print      ipratropium - albuterol 0.5 mg/2.5 mg/3 mL (DUONEB) 0.5-2.5 (3) MG/3ML neb solution Take 1 vial (3 mLs) by nebulization every 4 hours as needed for wheezing, Disp-90 mL, R-0, Local Print      multivitamins w/minerals liquid Place 15 mLs into Feeding Tube daily for 30 days, Disp-450 mL, R-0, Local Print       !! - Potential duplicate medications found. Please discuss with provider.        CONTINUE these medications which have CHANGED    Details   albuterol (PROAIR HFA/PROVENTIL HFA/VENTOLIN HFA) 108 (90 Base) MCG/ACT inhaler Inhale 2 puffs into the lungs every 4 hours as needed for shortness of breath, wheezing or cough,  Disp-18 g, R-0, Local PrintPharmacy may dispense brand covered by insurance (Proair, or proventil or ventolin or generic albuterol inhaler)      amitriptyline (ELAVIL) 10 MG tablet Take 1 tablet (10 mg) by mouth at bedtime for 30 days, Disp-30 tablet, R-0, Local Print      atorvastatin (LIPITOR) 40 MG tablet Take 1 tablet (40 mg) by mouth every evening for 30 days, Disp-30 tablet, R-0, Local Print      digoxin (LANOXIN) 125 MCG tablet Take 1 tablet (125 mcg) by mouth daily for 30 days, Disp-30 tablet, R-0, Local Print      diltiazem (CARDIZEM) 60 MG tablet Take 1 tablet (60 mg) by mouth every 8 hours for 30 days, Disp-90 tablet, R-0, Local Print      empagliflozin (JARDIANCE) 10 MG TABS tablet Take 1 tablet (10 mg) by mouth or Feeding Tube daily for 180 days, Disp-90 tablet, R-1, E-Prescribe      insulin glargine (LANTUS PEN) 100 UNIT/ML pen Inject 8 Units subcutaneously at bedtime for 188 days, Disp-15 mL, R-0, Local PrintIf Lantus is not covered by insurance, may substitute Basaglar or Semglee or other insulin glargine product per insurance preference at same dose and frequency.        losartan (COZAAR) 50 MG tablet Take 1 tablet (50 mg) by mouth daily for 30 days, Disp-30 tablet, R-0, Local Print      metoprolol tartrate (LOPRESSOR) 100 MG tablet Take 1 tablet (100 mg) by mouth 2 times daily for 30 days, Disp-60 tablet, R-0, Local Print      mometasone-formoterol (DULERA) 100-5 MCG/ACT inhaler Inhale 2 puffs into the lungs 2 times daily for 30 days, Disp-13 g, R-0, Local Print           CONTINUE these medications which have NOT CHANGED    Details   !! acetaminophen (TYLENOL) 500 MG tablet Take 500-1,000 mg by mouth every 6 hours as needed for mild pain, Historical       !! - Potential duplicate medications found. Please discuss with provider.        STOP taking these medications       gabapentin (NEURONTIN) 250 MG/5ML solution Comments:   Reason for Stopping:         METFORMIN HCL PO Comments:   Reason for  Stopping:         oxyCODONE-acetaminophen (PERCOCET) 5-325 MG per tablet Comments:   Reason for Stopping:         sennosides (SENOKOT) 8.6 MG tablet Comments:   Reason for Stopping:             Allergies   No Known Allergies

## 2024-07-24 NOTE — PLAN OF CARE
"Blood pressure 136/70, pulse 95, temperature 98  F (36.7  C), temperature source Oral, resp. rate 18, height 1.651 m (5' 5\"), weight 96 kg (211 lb 10.3 oz), SpO2 96%. Via RA .     Assumed care at 07:00      Patient A & O X 3 intermittent confusion, C/o   headache received Tylenol 650 mg via PEG PRN . Received TF bolus X 2  per order , flushed with water as ordered. Did not want to eat. Patient stable to her baseline . Has order to discharge to  LTC . Patient and patient family aware .  Sw arranged ride with [facility] [employer] transport via stretcher, for 11-12Pm .  Patient left the unt at ~ 13:20 escorted by transport. Report given to violetta RN ( [phone]) Patient belonging sent with transport and some tube feeding per LTC request.   Patient belonging , Phone , 2 changers , 1 black pant , partial denture , and Helmet . Patient Daughter report her mother missing pink T-shirt  checked in room not Pt room.          "

## 2024-07-24 NOTE — PROVIDER NOTIFICATION
"[place] [facility] Shay Rodriguez paged:    \"7C room [id number]  Pt's pulse oximetry is reading 50s-90s for HR. With vitals, BP cuff read 110. With auscultation, I was reading 90-100s. I see EKG from 7/19 said likely afib. Did you want to get another EKG?     #[phone] Birdie Ngo RN\"    EKG, BMP, Mg and Phos ordered    2154: provider paged again that pt is currently in Afib. Pt refusing labs, provider aware. No changes to plan of care at this time.  "

## 2024-07-24 NOTE — PLAN OF CARE
Hours of care [id number]    Neuro: A&Ox3. Disoriented to situation. Intermittently confused. R sided weakness (baseline since stroke). Flat affect.  Cardiac: VSS.              Respiratory: Sating >92% on RA.  GI/: Adequate urine output. Refused purewick in AM. Incontinent of bowel and bladder  Diet/appetite: Bolus TF 5x/day ( one completed this shift). Minced and moist diet with 1:1 supervision.   Activity:  Assist of 2 w/ lift.  Pain: At acceptable level on current regimen.   Skin: No new deficits noted.  LDA's: L PIV: SL. PEG.     Plan: Continue with POC. Notify primary team with changes.Medical transport scheduled for [phone] today to bring pt to LTC

## 2024-07-24 NOTE — PLAN OF CARE
"4203-2205  BP (!) 153/67 (BP Location: Right arm)   Pulse 93   Temp 98.3  F (36.8  C) (Axillary)   Resp 16   Ht 1.651 m (5' 5\")   Wt 96 kg (211 lb 10.3 oz)   LMP  (LMP Unknown)   SpO2 98%   BMI 35.22 kg/m      Oriented x4 however, intermittent confusion and rambling. VSS on room air, afib. Up with Ax2 and lift, up in wheelchair x1 today. Pureed diet, poor appetite, had some ice chips on shift. TF bolus via PEG tube given on shift for total of 5 boluses today. PEG clamped now. Called for bedpan x2 on shift, intermittent incontinence. Provider paged for afib, see previous note. L PIV saline locked. No significant changes on shift.    Goal Outcome Evaluation:  Plan of Care Reviewed With: patient  Overall Patient Progress: no change  Outcome Evaluation: Plan to discharge tomorrow to LTC.  "

## 2024-07-24 NOTE — PLAN OF CARE
Speech Language Therapy Discharge Summary    Reason for therapy discharge:    Discharged to long term care facility.    Progress towards therapy goal(s). See goals on Care Plan in Epic electronic health record for goal details.  Goals partially met.  Barriers to achieving goals:   discharge from facility.    Therapy recommendation(s):    On 7/22, SLP changed pt's diet to puree (IDDSI 4) with thin liquids (IDDSI 0) w/ 1:1 supervision and verbal cues for swallowing strategies. Pt had declined to wear dentures and was demonstrating pocketing of solids. Pt may benefit from ongoing SLP services at [facility] to help her return to baseline diet.

## 2024-07-24 NOTE — INTERIM SUMMARY
Brief cross cover note:     Paged by RN regarding HR. Pt tachycardic to ~110. Pt has known hx of afib. EKG obtained showing afib with RVR but HR ~110-120. Bmp, Mg, Phos ordered however pt is refusing labs. Appears pt has kidney dysfunction thus empirically giving cardiac lyte replacement could be dangerous. Overall interventions unable to be completed due to patient refusal.  -Continue to encourage pt to get labs drawn  -Ordered metop 5mg IV push for HR consistently > 130  -Continue to monitor clinically    Chase Davis DO 07/23/24 at 10:25 PM

## 2024-07-28 VITALS
TEMPERATURE: 98 F | WEIGHT: 206 LBS | HEART RATE: 74 BPM | RESPIRATION RATE: 17 BRPM | SYSTOLIC BLOOD PRESSURE: 128 MMHG | OXYGEN SATURATION: 97 % | BODY MASS INDEX: 36.5 KG/M2 | HEIGHT: 63 IN | DIASTOLIC BLOOD PRESSURE: 76 MMHG

## 2024-07-28 NOTE — PROGRESS NOTES
[facility] GERIATRICS    PRIMARY CARE PROVIDER AND CLINIC:  Lolita King DO, [address] / [place] [address]  Chief Complaint   Patient presents with    Hospital F/U      [place] Medical Record Number:  [id number]  Place of Service where encounter took place:  [facility] ([facility]) [[id number]]    Roya Burgos  is a 69 year old  (1955), admitted to the above facility from  [facility]. Hospital stay 7/16/24 through 7/24/24..     HPI:    This is a 68 yo female with PMHx for recent ischemic left MCA stroke c/b cerebral edema s/p decompressive hemicraniectomy c/b hemorrhagic transformation 2/2 therapeutic anticoagulation for atrial fibrillation old complicated by dysarthria and significant right-sided berhane-paralysis, oropharyngeal dysphagia s/p percutaneous gastrostomy tube on tube feeding, chronic migraine headache, paroxysmal atrial fibrillation on aspirin only given brain bleeding, chronic heart failure with mildly reduced ejection fraction, hypertension, type 2 diabetes mellitus, hyperlipidemia, chronic kidney disease stage III A, L parotid gland nodule, Bilateral moderate carotid stenosis, and COPD not on home oxygen. requent hospitalization in [facility], recently discharged on 7/14/2024 for care at home with home visiting nurse service, returned as daughter was unable to care for her at home. PTA Percocet, gabapentin and metformin discontinued.  Likely will need LTC placement, [facility]    CODE STATUS/ADVANCE DIRECTIVES DISCUSSION:  Full Code  DNR / DNI  ALLERGIES: No Known Allergies   PAST MEDICAL HISTORY:   Past Medical History:   Diagnosis Date    Diabetes mellitus, type II, insulin dependent (H)     HTN (hypertension)     Major depression       PAST SURGICAL HISTORY:   has a past surgical history that includes Ankle surgery; IR Lower Extremity Angiogram Left  (4/6/2016); and IR Lower Extremity Angiogram Left (2/3/2016).  FAMILY HISTORY: family history includes Asthma in her mother; Cancer - colorectal (age of onset: 52) in her sister; Diabetes in her brother, sister, and sister.  SOCIAL HISTORY:   reports that she does not drink alcohol and does not use drugs.  Patient's living condition: lives with family, daughter     Post Discharge Medication Reconciliation Status:   MED REC REQUIRED  Post Medication Reconciliation Status:  Discharge medications reconciled and changed, see notes/orders       Current Outpatient Medications   Medication Sig Dispense Refill    acetaminophen (TYLENOL) 325 MG tablet Take 2 tablets (650 mg) by mouth every 4 hours as needed for mild pain or other (and adjunct with moderate or severe pain or per patient request) 60 tablet 0    albuterol (PROAIR HFA/PROVENTIL HFA/VENTOLIN HFA) 108 (90 Base) MCG/ACT inhaler Inhale 2 puffs into the lungs every 4 hours as needed for shortness of breath, wheezing or cough 18 g 0    amitriptyline (ELAVIL) 10 MG tablet Take 1 tablet (10 mg) by mouth at bedtime for 30 days 30 tablet 0    atorvastatin (LIPITOR) 40 MG tablet Take 1 tablet (40 mg) by mouth every evening for 30 days 30 tablet 0    digoxin (LANOXIN) 125 MCG tablet Take 1 tablet (125 mcg) by mouth daily for 30 days 30 tablet 0    diltiazem (CARDIZEM) 60 MG tablet Take 1 tablet (60 mg) by mouth every 8 hours for 30 days 90 tablet 0    empagliflozin (JARDIANCE) 10 MG TABS tablet Take 1 tablet (10 mg) by mouth or Feeding Tube daily for 180 days 90 tablet 1    insulin glargine (LANTUS PEN) 100 UNIT/ML pen Inject 8 Units subcutaneously at bedtime for 188 days (Patient taking differently: Inject 10 Units subcutaneously 2 times daily) 15 mL 0    insulin lispro (HUMALOG) 100 UNIT/ML vial Inject 1-3 Units subcutaneously 3 times daily (before meals) Correction Scale - LOW INSULIN RESISTANCE DOSING Do Not give Correction Insulin if Pre-Meal BG less than 140. For  "Pre-Meal  - 239 give 1 unit. For Pre-Meal  - 339 give 2 units. For Pre-Meal BG greater than or equal to 340 give 3 units. To be given with prandial insulin, and based on pre-meal blood glucose. Administering insulin within 5 minutes of the start of the meal is ideal. Administer insulin no more than 30 minutes after the start of the meal, unless directed otherwise by provider. Notify provider if glucose greater than or equal to 350 mg/dL after administration of correction dose. 10 mL 0    insulin lispro (HUMALOG) 100 UNIT/ML vial Inject 1-3 Units subcutaneously at bedtime LOW INSULIN RESISTANCE DOSING Do Not give Bedtime Correction Insulin if BG less than 200. For  - 299 give 1 unit. For  - 399 give 2 units For BG greater than or equal 400 give 3 units. Notify provider if glucose greater than or equal to 350 mg/dL after administration of correction dose 10 mL 0    ipratropium - albuterol 0.5 mg/2.5 mg/3 mL (DUONEB) 0.5-2.5 (3) MG/3ML neb solution Take 1 vial (3 mLs) by nebulization every 4 hours as needed for wheezing 90 mL 0    losartan (COZAAR) 50 MG tablet Take 1 tablet (50 mg) by mouth daily for 30 days 30 tablet 0    metoprolol tartrate (LOPRESSOR) 100 MG tablet Take 1 tablet (100 mg) by mouth 2 times daily for 30 days 60 tablet 0    mometasone-formoterol (DULERA) 100-5 MCG/ACT inhaler Inhale 2 puffs into the lungs 2 times daily for 30 days 13 g 0    multivitamins w/minerals liquid Place 15 mLs into Feeding Tube daily for 30 days 450 mL 0     No current facility-administered medications for this visit.       ROS:  10 point ROS of systems including Constitutional, Eyes, Respiratory, Cardiovascular, Gastroenterology, Genitourinary, Integumentary, Musculoskeletal, Psychiatric were all negative except for pertinent positives noted in my HPI.    Vitals:  /76   Pulse 74   Temp 98  F (36.7  C)   Resp 17   Ht 1.6 m (5' 3\")   Wt 93.4 kg (206 lb)   LMP  (LMP Unknown)   SpO2 97%   BMI " 36.49 kg/m    Exam:  GENERAL APPEARANCE:  in no distress, morbidly obese  ENT:  Mouth and posterior oropharynx normal, moist mucous membranes, [place]  NECK:  No adenopathy,masses or thyromegaly  RESP:  no respiratory distress, diminished breath sounds bilaterally, RA  CV:  irregular rhythm per rate controlled A-fib  ABDOMEN:  normal bowel sounds, soft, nontender, no hepatosplenomegaly or other masses, PEG patent  M/S:   follows commands  SKIN:  Inspection of skin and subcutaneous tissue baseline  NEURO:   dense R sided hemiparesis  PSYCH:  insight and judgement impaired, memory impaired     Lab/Diagnostic data:  Most Recent 3 CBC's:  Recent Labs   Lab Test 07/19/24  1119 07/18/24  0646 07/16/24  1443   WBC 10.4 8.7 10.0   HGB 10.9* 11.1* 11.0*   MCV 89 87 88    356 414     Most Recent 3 BMP's:  Recent Labs   Lab Test 07/24/24  1231 07/24/24  0836 07/24/24  0412 07/19/24  1200 07/19/24  1119 07/18/24  0808 07/18/24  0646   NA  --   --  138  --  137  --  139   POTASSIUM  --   --  4.5  --  4.3  --  4.3   CHLORIDE  --   --  100  --  102  --  104   CO2  --   --  25  --  23  --  25   BUN  --   --  27.9*  --  15.6  --  17.5   CR  --   --  1.11*  --  1.04*  --  1.08*   ANIONGAP  --   --  13  --  12  --  10   AMAURY  --   --  9.5  --  9.4  --  9.6   * 264* 169*   < > 268*   < > 135*    < > = values in this interval not displayed.       ASSESSMENT/PLAN:    (I69.359) Hemiplegia and hemiparesis following cerebral infarction affecting unspecified side (H)    Recent ischemic left MCA stroke complicated by cerebral edema status post decompressive hemicraniectomy  R sided hemiparesis, continue therapy, follow-up with NSGY as ordered    (Z78.9) On tube feeding diet  Continue Jevity 1.5 per 1 can x5/day with FW 60cc x5/day. Speech consult    (I50.20) HFrEF (heart failure with reduced ejection fraction) (H)  (I50.21) Acute systolic heart failure (H)  A-fib  (I15.0) Renovascular hypertension  EF 46%, continue digoxin 125mcg  daily (last level 1.0 on 7/24), diltiazem 60mg TID, losartan 50mg daily and metoprolol tartrate 100mg BID. Monitor BP BID    T2DM  Last A1c 7.4 on 7/16, continue empagliflozin 10mg daily, increase lantus to 10U BID and lispro sliding scale. Monitor BS QID    (E66.01) Obesity, morbid (H)  Last BMI >36    CKD stage 3a  Last Cr 1.11 with GFR 54 on 7/24, recheck BMP on 7/31    (D64.9) Normocytic anemia  Last Hgb 10.9, recheck CBC on 7/31    COPD  Continue Dulera 100/5mcg 2 puffs twice daily, room air    (F33.9) Depression, recurrent   Insomnia  Continue amitriptyline 10 mg at bedtime    Orders:  Increase blood pressure monitoring, increased blood sugar monitoring, increase Lantus, BMP/CBC    Electronically signed by:  Luisito Willis NP

## 2024-07-29 ENCOUNTER — LAB REQUISITION (OUTPATIENT)
Dept: LAB | Facility: CLINIC | Age: 69
End: 2024-07-29
Payer: COMMERCIAL

## 2024-07-29 ENCOUNTER — TRANSITIONAL CARE UNIT VISIT (OUTPATIENT)
Dept: GERIATRICS | Facility: CLINIC | Age: 69
End: 2024-07-29
Payer: COMMERCIAL

## 2024-07-29 DIAGNOSIS — N18.30 TYPE 2 DIABETES MELLITUS WITH STAGE 3 CHRONIC KIDNEY DISEASE, WITH LONG-TERM CURRENT USE OF INSULIN, UNSPECIFIED WHETHER STAGE 3A OR 3B CKD (H): ICD-10-CM

## 2024-07-29 DIAGNOSIS — I50.20 HFREF (HEART FAILURE WITH REDUCED EJECTION FRACTION) (H): ICD-10-CM

## 2024-07-29 DIAGNOSIS — I13.0 HYPERTENSIVE HEART AND CHRONIC KIDNEY DISEASE WITH HEART FAILURE AND STAGE 1 THROUGH STAGE 4 CHRONIC KIDNEY DISEASE, OR UNSPECIFIED CHRONIC KIDNEY DISEASE (H): ICD-10-CM

## 2024-07-29 DIAGNOSIS — E66.01 OBESITY, MORBID (H): ICD-10-CM

## 2024-07-29 DIAGNOSIS — I50.21 ACUTE SYSTOLIC HEART FAILURE (H): ICD-10-CM

## 2024-07-29 DIAGNOSIS — Z78.9 ON TUBE FEEDING DIET: ICD-10-CM

## 2024-07-29 DIAGNOSIS — E11.22 TYPE 2 DIABETES MELLITUS WITH STAGE 3 CHRONIC KIDNEY DISEASE, WITH LONG-TERM CURRENT USE OF INSULIN, UNSPECIFIED WHETHER STAGE 3A OR 3B CKD (H): ICD-10-CM

## 2024-07-29 DIAGNOSIS — D64.9 NORMOCYTIC ANEMIA: ICD-10-CM

## 2024-07-29 DIAGNOSIS — I69.359 HEMIPLEGIA AND HEMIPARESIS FOLLOWING CEREBRAL INFARCTION AFFECTING UNSPECIFIED SIDE (H): Primary | ICD-10-CM

## 2024-07-29 DIAGNOSIS — I15.0 RENOVASCULAR HYPERTENSION: ICD-10-CM

## 2024-07-29 DIAGNOSIS — F33.9 DEPRESSION, RECURRENT (H): ICD-10-CM

## 2024-07-29 DIAGNOSIS — Z79.4 TYPE 2 DIABETES MELLITUS WITH STAGE 3 CHRONIC KIDNEY DISEASE, WITH LONG-TERM CURRENT USE OF INSULIN, UNSPECIFIED WHETHER STAGE 3A OR 3B CKD (H): ICD-10-CM

## 2024-07-29 PROCEDURE — 99309 SBSQ NF CARE MODERATE MDM 30: CPT | Performed by: NURSE PRACTITIONER

## 2024-07-29 NOTE — LETTER
7/29/2024      Roya Burgos  [address]  [place] [address]        [facility] GERIATRICS    PRIMARY CARE PROVIDER AND CLINIC:  Lolita Knig DO, [address] / [place] [address]  Chief Complaint   Patient presents with     Hospital F/U      [place] Medical Record Number:  [id number]  Place of Service where encounter took place:  [facility] ([facility]) [[id number]]    Roya Burgos  is a 69 year old  (1955), admitted to the above facility from  [facility]. Hospital stay 7/16/24 through 7/24/24..     HPI:    This is a 70 yo female with PMHx for recent ischemic left MCA stroke c/b cerebral edema s/p decompressive hemicraniectomy c/b hemorrhagic transformation 2/2 therapeutic anticoagulation for atrial fibrillation old complicated by dysarthria and significant right-sided berhane-paralysis, oropharyngeal dysphagia s/p percutaneous gastrostomy tube on tube feeding, chronic migraine headache, paroxysmal atrial fibrillation on aspirin only given brain bleeding, chronic heart failure with mildly reduced ejection fraction, hypertension, type 2 diabetes mellitus, hyperlipidemia, chronic kidney disease stage III A, L parotid gland nodule, Bilateral moderate carotid stenosis, and COPD not on home oxygen. requent hospitalization in [facility], recently discharged on 7/14/2024 for care at home with home visiting nurse service, returned as daughter was unable to care for her at home. PTA Percocet, gabapentin and metformin discontinued.  Likely will need LTC placement, [facility]    CODE STATUS/ADVANCE DIRECTIVES DISCUSSION:  Full Code  DNR / DNI  ALLERGIES: No Known Allergies   PAST MEDICAL HISTORY:   Past Medical History:   Diagnosis Date     Diabetes mellitus, type II, insulin dependent (H)      HTN (hypertension)      Major depression       PAST SURGICAL HISTORY:   has a past  surgical history that includes Ankle surgery; IR Lower Extremity Angiogram Left (4/6/2016); and IR Lower Extremity Angiogram Left (2/3/2016).  FAMILY HISTORY: family history includes Asthma in her mother; Cancer - colorectal (age of onset: 52) in her sister; Diabetes in her brother, sister, and sister.  SOCIAL HISTORY:   reports that she does not drink alcohol and does not use drugs.  Patient's living condition: lives with family, daughter     Post Discharge Medication Reconciliation Status:   MED REC REQUIRED  Post Medication Reconciliation Status:  Discharge medications reconciled and changed, see notes/orders       Current Outpatient Medications   Medication Sig Dispense Refill     acetaminophen (TYLENOL) 325 MG tablet Take 2 tablets (650 mg) by mouth every 4 hours as needed for mild pain or other (and adjunct with moderate or severe pain or per patient request) 60 tablet 0     albuterol (PROAIR HFA/PROVENTIL HFA/VENTOLIN HFA) 108 (90 Base) MCG/ACT inhaler Inhale 2 puffs into the lungs every 4 hours as needed for shortness of breath, wheezing or cough 18 g 0     amitriptyline (ELAVIL) 10 MG tablet Take 1 tablet (10 mg) by mouth at bedtime for 30 days 30 tablet 0     atorvastatin (LIPITOR) 40 MG tablet Take 1 tablet (40 mg) by mouth every evening for 30 days 30 tablet 0     digoxin (LANOXIN) 125 MCG tablet Take 1 tablet (125 mcg) by mouth daily for 30 days 30 tablet 0     diltiazem (CARDIZEM) 60 MG tablet Take 1 tablet (60 mg) by mouth every 8 hours for 30 days 90 tablet 0     empagliflozin (JARDIANCE) 10 MG TABS tablet Take 1 tablet (10 mg) by mouth or Feeding Tube daily for 180 days 90 tablet 1     insulin glargine (LANTUS PEN) 100 UNIT/ML pen Inject 8 Units subcutaneously at bedtime for 188 days (Patient taking differently: Inject 10 Units subcutaneously 2 times daily) 15 mL 0     insulin lispro (HUMALOG) 100 UNIT/ML vial Inject 1-3 Units subcutaneously 3 times daily (before meals) Correction Scale - LOW  INSULIN RESISTANCE DOSING Do Not give Correction Insulin if Pre-Meal BG less than 140. For Pre-Meal  - 239 give 1 unit. For Pre-Meal  - 339 give 2 units. For Pre-Meal BG greater than or equal to 340 give 3 units. To be given with prandial insulin, and based on pre-meal blood glucose. Administering insulin within 5 minutes of the start of the meal is ideal. Administer insulin no more than 30 minutes after the start of the meal, unless directed otherwise by provider. Notify provider if glucose greater than or equal to 350 mg/dL after administration of correction dose. 10 mL 0     insulin lispro (HUMALOG) 100 UNIT/ML vial Inject 1-3 Units subcutaneously at bedtime LOW INSULIN RESISTANCE DOSING Do Not give Bedtime Correction Insulin if BG less than 200. For  - 299 give 1 unit. For  - 399 give 2 units For BG greater than or equal 400 give 3 units. Notify provider if glucose greater than or equal to 350 mg/dL after administration of correction dose 10 mL 0     ipratropium - albuterol 0.5 mg/2.5 mg/3 mL (DUONEB) 0.5-2.5 (3) MG/3ML neb solution Take 1 vial (3 mLs) by nebulization every 4 hours as needed for wheezing 90 mL 0     losartan (COZAAR) 50 MG tablet Take 1 tablet (50 mg) by mouth daily for 30 days 30 tablet 0     metoprolol tartrate (LOPRESSOR) 100 MG tablet Take 1 tablet (100 mg) by mouth 2 times daily for 30 days 60 tablet 0     mometasone-formoterol (DULERA) 100-5 MCG/ACT inhaler Inhale 2 puffs into the lungs 2 times daily for 30 days 13 g 0     multivitamins w/minerals liquid Place 15 mLs into Feeding Tube daily for 30 days 450 mL 0     No current facility-administered medications for this visit.       ROS:  10 point ROS of systems including Constitutional, Eyes, Respiratory, Cardiovascular, Gastroenterology, Genitourinary, Integumentary, Musculoskeletal, Psychiatric were all negative except for pertinent positives noted in my HPI.    Vitals:  /76   Pulse 74   Temp 98  F (36.7  " C)   Resp 17   Ht 1.6 m (5' 3\")   Wt 93.4 kg (206 lb)   LMP  (LMP Unknown)   SpO2 97%   BMI 36.49 kg/m    Exam:  GENERAL APPEARANCE:  in no distress, morbidly obese  ENT:  Mouth and posterior oropharynx normal, moist mucous membranes, [place]  NECK:  No adenopathy,masses or thyromegaly  RESP:  no respiratory distress, diminished breath sounds bilaterally, RA  CV:  irregular rhythm per rate controlled A-fib  ABDOMEN:  normal bowel sounds, soft, nontender, no hepatosplenomegaly or other masses, PEG patent  M/S:   follows commands  SKIN:  Inspection of skin and subcutaneous tissue baseline  NEURO:   dense R sided hemiparesis  PSYCH:  insight and judgement impaired, memory impaired     Lab/Diagnostic data:  Most Recent 3 CBC's:  Recent Labs   Lab Test 07/19/24  1119 07/18/24  0646 07/16/24  1443   WBC 10.4 8.7 10.0   HGB 10.9* 11.1* 11.0*   MCV 89 87 88    356 414     Most Recent 3 BMP's:  Recent Labs   Lab Test 07/24/24  1231 07/24/24  0836 07/24/24  0412 07/19/24  1200 07/19/24  1119 07/18/24  0808 07/18/24  0646   NA  --   --  138  --  137  --  139   POTASSIUM  --   --  4.5  --  4.3  --  4.3   CHLORIDE  --   --  100  --  102  --  104   CO2  --   --  25  --  23  --  25   BUN  --   --  27.9*  --  15.6  --  17.5   CR  --   --  1.11*  --  1.04*  --  1.08*   ANIONGAP  --   --  13  --  12  --  10   AMAURY  --   --  9.5  --  9.4  --  9.6   * 264* 169*   < > 268*   < > 135*    < > = values in this interval not displayed.       ASSESSMENT/PLAN:    (I69.359) Hemiplegia and hemiparesis following cerebral infarction affecting unspecified side (H)    Recent ischemic left MCA stroke complicated by cerebral edema status post decompressive hemicraniectomy  R sided hemiparesis, continue therapy, follow-up with NSGY as ordered    (Z78.9) On tube feeding diet  Continue Jevity 1.5 per 1 can x5/day with FW 60cc x5/day. Speech consult    (I50.20) HFrEF (heart failure with reduced ejection fraction) (H)  (I50.21) Acute " systolic heart failure (H)  A-fib  (I15.0) Renovascular hypertension  EF 46%, continue digoxin 125mcg daily (last level 1.0 on 7/24), diltiazem 60mg TID, losartan 50mg daily and metoprolol tartrate 100mg BID. Monitor BP BID    T2DM  Last A1c 7.4 on 7/16, continue empagliflozin 10mg daily, increase lantus to 10U BID and lispro sliding scale. Monitor BS QID    (E66.01) Obesity, morbid (H)  Last BMI >36    CKD stage 3a  Last Cr 1.11 with GFR 54 on 7/24, recheck BMP on 7/31    (D64.9) Normocytic anemia  Last Hgb 10.9, recheck CBC on 7/31    COPD  Continue Dulera 100/5mcg 2 puffs twice daily, room air    (F33.9) Depression, recurrent   Insomnia  Continue amitriptyline 10 mg at bedtime    Orders:  Increase blood pressure monitoring, increased blood sugar monitoring, increase Lantus, BMP/CBC    Electronically signed by:  Luisito Willis NP                   Sincerely,        Luisito Willis NP

## 2024-07-30 VITALS
DIASTOLIC BLOOD PRESSURE: 78 MMHG | HEIGHT: 63 IN | OXYGEN SATURATION: 96 % | HEART RATE: 86 BPM | WEIGHT: 201.5 LBS | SYSTOLIC BLOOD PRESSURE: 124 MMHG | RESPIRATION RATE: 20 BRPM | BODY MASS INDEX: 35.7 KG/M2 | TEMPERATURE: 97.8 F

## 2024-07-31 ENCOUNTER — TRANSITIONAL CARE UNIT VISIT (OUTPATIENT)
Dept: GERIATRICS | Facility: CLINIC | Age: 69
End: 2024-07-31
Payer: COMMERCIAL

## 2024-07-31 DIAGNOSIS — D64.9 NORMOCYTIC ANEMIA: ICD-10-CM

## 2024-07-31 DIAGNOSIS — N18.30 TYPE 2 DIABETES MELLITUS WITH STAGE 3 CHRONIC KIDNEY DISEASE, WITH LONG-TERM CURRENT USE OF INSULIN, UNSPECIFIED WHETHER STAGE 3A OR 3B CKD (H): Primary | ICD-10-CM

## 2024-07-31 DIAGNOSIS — I15.0 RENOVASCULAR HYPERTENSION: ICD-10-CM

## 2024-07-31 DIAGNOSIS — I50.20 HFREF (HEART FAILURE WITH REDUCED EJECTION FRACTION) (H): ICD-10-CM

## 2024-07-31 DIAGNOSIS — Z78.9 ON TUBE FEEDING DIET: ICD-10-CM

## 2024-07-31 DIAGNOSIS — E11.22 TYPE 2 DIABETES MELLITUS WITH STAGE 3 CHRONIC KIDNEY DISEASE, WITH LONG-TERM CURRENT USE OF INSULIN, UNSPECIFIED WHETHER STAGE 3A OR 3B CKD (H): Primary | ICD-10-CM

## 2024-07-31 DIAGNOSIS — Z79.4 TYPE 2 DIABETES MELLITUS WITH STAGE 3 CHRONIC KIDNEY DISEASE, WITH LONG-TERM CURRENT USE OF INSULIN, UNSPECIFIED WHETHER STAGE 3A OR 3B CKD (H): Primary | ICD-10-CM

## 2024-07-31 LAB
ANION GAP SERPL CALCULATED.3IONS-SCNC: 13 MMOL/L (ref 7–15)
BUN SERPL-MCNC: 31.4 MG/DL (ref 8–23)
CALCIUM SERPL-MCNC: 9.7 MG/DL (ref 8.8–10.4)
CHLORIDE SERPL-SCNC: 100 MMOL/L (ref 98–107)
CREAT SERPL-MCNC: 1.14 MG/DL (ref 0.51–0.95)
EGFRCR SERPLBLD CKD-EPI 2021: 53 ML/MIN/1.73M2
ERYTHROCYTE [DISTWIDTH] IN BLOOD BY AUTOMATED COUNT: 17.1 % (ref 10–15)
GLUCOSE SERPL-MCNC: 162 MG/DL (ref 70–99)
HCO3 SERPL-SCNC: 24 MMOL/L (ref 22–29)
HCT VFR BLD AUTO: 39.7 % (ref 35–47)
HGB BLD-MCNC: 11.6 G/DL (ref 11.7–15.7)
MCH RBC QN AUTO: 25.7 PG (ref 26.5–33)
MCHC RBC AUTO-ENTMCNC: 29.2 G/DL (ref 31.5–36.5)
MCV RBC AUTO: 88 FL (ref 78–100)
PLATELET # BLD AUTO: 333 10E3/UL (ref 150–450)
POTASSIUM SERPL-SCNC: 4.5 MMOL/L (ref 3.4–5.3)
RBC # BLD AUTO: 4.51 10E6/UL (ref 3.8–5.2)
SODIUM SERPL-SCNC: 137 MMOL/L (ref 135–145)
WBC # BLD AUTO: 11.4 10E3/UL (ref 4–11)

## 2024-07-31 PROCEDURE — P9604 ONE-WAY ALLOW PRORATED TRIP: HCPCS | Mod: ORL | Performed by: NURSE PRACTITIONER

## 2024-07-31 PROCEDURE — 85027 COMPLETE CBC AUTOMATED: CPT | Mod: ORL | Performed by: NURSE PRACTITIONER

## 2024-07-31 PROCEDURE — 99309 SBSQ NF CARE MODERATE MDM 30: CPT | Performed by: NURSE PRACTITIONER

## 2024-07-31 PROCEDURE — 80048 BASIC METABOLIC PNL TOTAL CA: CPT | Mod: ORL | Performed by: NURSE PRACTITIONER

## 2024-07-31 PROCEDURE — 36415 COLL VENOUS BLD VENIPUNCTURE: CPT | Mod: ORL | Performed by: NURSE PRACTITIONER

## 2024-07-31 NOTE — PROGRESS NOTES
"[facility] GERIATRICS    Chief Complaint   Patient presents with    RECHECK     HPI:  Roya Burgos is a 69 year old  (1955), who is being seen today for an episodic care visit at: [facility] ([facility]) [[id number]].     This is a 70 yo female with PMHx for recent ischemic left MCA stroke c/b cerebral edema s/p decompressive hemicraniectomy c/b hemorrhagic transformation 2/2 therapeutic anticoagulation for atrial fibrillation old complicated by dysarthria and significant right-sided berhane-paralysis, oropharyngeal dysphagia s/p percutaneous gastrostomy tube on tube feeding, chronic migraine headache, paroxysmal atrial fibrillation on aspirin only given brain bleeding, chronic heart failure with mildly reduced ejection fraction, hypertension, type 2 diabetes mellitus, hyperlipidemia, chronic kidney disease stage III A, L parotid gland nodule, Bilateral moderate carotid stenosis, and COPD not on home oxygen. requent hospitalization in [facility], recently discharged on 7/14/2024 for care at home with home visiting nurse service, returned as daughter was unable to care for her at home. PTA Percocet, gabapentin and metformin discontinued.  Likely will need LTC placement, discharged to [facility] for rehab/placement.     Today's concern is:   DM, CVA, dysphagia    Allergies, and PMH/PSH reviewed in EPIC today.  REVIEW OF SYSTEMS:  4 point ROS including Respiratory, CV, GI and , other than that noted in the HPI,  is negative    Objective:   /78   Pulse 86   Temp 97.8  F (36.6  C)   Resp 20   Ht 1.6 m (5' 3\")   Wt 91.4 kg (201 lb 8 oz)   LMP  (LMP Unknown)   SpO2 96%   BMI 35.69 kg/m    GENERAL APPEARANCE:  in no distress, morbidly obese  RESP:  no respiratory distress, diminished breath sounds bilaterally, RA  CV:  irregular rhythm per rate controlled A-fib  ABDOMEN:  normal bowel sounds, soft, nontender, no hepatosplenomegaly or other masses, PEG patent  NEURO:   " dense R sided hemiparesis  PSYCH:  insight and judgement impaired, memory impaired     Most Recent 3 CBC's:  Recent Labs   Lab Test 07/31/24  0530 07/19/24  1119 07/18/24  0646   WBC 11.4* 10.4 8.7   HGB 11.6* 10.9* 11.1*   MCV 88 89 87    340 356     Most Recent 3 BMP's:  Recent Labs   Lab Test 07/24/24  1231 07/24/24  0836 07/24/24  0412 07/19/24  1200 07/19/24  1119 07/18/24  0808 07/18/24  0646   NA  --   --  138  --  137  --  139   POTASSIUM  --   --  4.5  --  4.3  --  4.3   CHLORIDE  --   --  100  --  102  --  104   CO2  --   --  25  --  23  --  25   BUN  --   --  27.9*  --  15.6  --  17.5   CR  --   --  1.11*  --  1.04*  --  1.08*   ANIONGAP  --   --  13  --  12  --  10   AMAURY  --   --  9.5  --  9.4  --  9.6   * 264* 169*   < > 268*   < > 135*    < > = values in this interval not displayed.       Assessment/Plan:    (I69.359) Hemiplegia and hemiparesis following cerebral infarction affecting unspecified side (H)    Recent ischemic left MCA stroke complicated by cerebral edema status post decompressive hemicraniectomy  R sided hemiparesis, continue therapy, follow-up with NSGY as ordered     (Z78.9) On tube feeding diet  Currently changing TF to PRN. Speech following, on DD6 diet     (I50.20) HFrEF (heart failure with reduced ejection fraction) (H)  (I50.21) Acute systolic heart failure (H)  A-fib  (I15.0) Renovascular hypertension  EF 46%, continue digoxin 125mcg daily (last level 1.0 on 7/24), diltiazem 60mg TID, losartan 50mg daily and metoprolol tartrate 100mg BID. -140s, HR <70s. Daily wts     T2DM  Last A1c 7.4 on 7/16, continue empagliflozin 10mg daily, increase lantus to 15U BID and lispro sliding scale. -180s in AM     (E66.01) Obesity, morbid (H)  Last BMI >36     CKD stage 3a  Last Cr 1.11 with GFR 54 on 7/24, recheck BMP on 7/31     (D64.9) Normocytic anemia  Last Hgb 11.6 on 7/31    Mild leukocytosis  Last WBC 11.6 on 7/31     COPD  Continue Dulera 100/5mcg 2 puffs twice  daily, room air     (F33.9) Depression, recurrent   Insomnia  Continue amitriptyline 10 mg at bedtime. Today stable    Orders:  Increase lantus    Electronically signed by: Luisito Willis NP

## 2024-07-31 NOTE — LETTER
" 7/31/2024      Roya Burgos  [address]  [place] [address]        [facility] GERIATRICS    Chief Complaint   Patient presents with     RECHECK     HPI:  Roya Burgos is a 69 year old  (1955), who is being seen today for an episodic care visit at: [facility] ([facility]) [[id number]].     This is a 68 yo female with PMHx for recent ischemic left MCA stroke c/b cerebral edema s/p decompressive hemicraniectomy c/b hemorrhagic transformation 2/2 therapeutic anticoagulation for atrial fibrillation old complicated by dysarthria and significant right-sided berhane-paralysis, oropharyngeal dysphagia s/p percutaneous gastrostomy tube on tube feeding, chronic migraine headache, paroxysmal atrial fibrillation on aspirin only given brain bleeding, chronic heart failure with mildly reduced ejection fraction, hypertension, type 2 diabetes mellitus, hyperlipidemia, chronic kidney disease stage III A, L parotid gland nodule, Bilateral moderate carotid stenosis, and COPD not on home oxygen. requent hospitalization in [facility], recently discharged on 7/14/2024 for care at home with home visiting nurse service, returned as daughter was unable to care for her at home. PTA Percocet, gabapentin and metformin discontinued.  Likely will need LTC placement, discharged to [facility] for rehab/placement.     Today's concern is:   DM, CVA, dysphagia    Allergies, and PMH/PSH reviewed in [facility] today.  REVIEW OF SYSTEMS:  4 point ROS including Respiratory, CV, GI and , other than that noted in the HPI,  is negative    Objective:   /78   Pulse 86   Temp 97.8  F (36.6  C)   Resp 20   Ht 1.6 m (5' 3\")   Wt 91.4 kg (201 lb 8 oz)   LMP  (LMP Unknown)   SpO2 96%   BMI 35.69 kg/m    GENERAL APPEARANCE:  in no distress, morbidly obese  RESP:  no respiratory distress, diminished breath sounds bilaterally, RA  CV:  irregular rhythm per rate controlled A-fib  ABDOMEN:  normal bowel " sounds, soft, nontender, no hepatosplenomegaly or other masses, PEG patent  NEURO:   dense R sided hemiparesis  PSYCH:  insight and judgement impaired, memory impaired     Most Recent 3 CBC's:  Recent Labs   Lab Test 07/31/24  0530 07/19/24  1119 07/18/24  0646   WBC 11.4* 10.4 8.7   HGB 11.6* 10.9* 11.1*   MCV 88 89 87    340 356     Most Recent 3 BMP's:  Recent Labs   Lab Test 07/24/24  1231 07/24/24  0836 07/24/24  0412 07/19/24  1200 07/19/24  1119 07/18/24  0808 07/18/24  0646   NA  --   --  138  --  137  --  139   POTASSIUM  --   --  4.5  --  4.3  --  4.3   CHLORIDE  --   --  100  --  102  --  104   CO2  --   --  25  --  23  --  25   BUN  --   --  27.9*  --  15.6  --  17.5   CR  --   --  1.11*  --  1.04*  --  1.08*   ANIONGAP  --   --  13  --  12  --  10   AMAURY  --   --  9.5  --  9.4  --  9.6   * 264* 169*   < > 268*   < > 135*    < > = values in this interval not displayed.       Assessment/Plan:    (I69.359) Hemiplegia and hemiparesis following cerebral infarction affecting unspecified side (H)    Recent ischemic left MCA stroke complicated by cerebral edema status post decompressive hemicraniectomy  R sided hemiparesis, continue therapy, follow-up with NSGY as ordered     (Z78.9) On tube feeding diet  Currently changing TF to PRN. Speech following, on DD6 diet     (I50.20) HFrEF (heart failure with reduced ejection fraction) (H)  (I50.21) Acute systolic heart failure (H)  A-fib  (I15.0) Renovascular hypertension  EF 46%, continue digoxin 125mcg daily (last level 1.0 on 7/24), diltiazem 60mg TID, losartan 50mg daily and metoprolol tartrate 100mg BID. -140s, HR <70s. Daily wts     T2DM  Last A1c 7.4 on 7/16, continue empagliflozin 10mg daily, increase lantus to 15U BID and lispro sliding scale. -180s in AM     (E66.01) Obesity, morbid (H)  Last BMI >36     CKD stage 3a  Last Cr 1.11 with GFR 54 on 7/24, recheck BMP on 7/31     (D64.9) Normocytic anemia  Last Hgb 11.6 on 7/31    Mild  leukocytosis  Last WBC 11.6 on 7/31     COPD  Continue Dulera 100/5mcg 2 puffs twice daily, room air     (F33.9) Depression, recurrent   Insomnia  Continue amitriptyline 10 mg at bedtime. Today stable    Orders:  Increase lantus    Electronically signed by: Luisito Willis NP         Sincerely,        Luisito Willis NP

## 2024-08-05 ENCOUNTER — DOCUMENTATION ONLY (OUTPATIENT)
Dept: GERIATRICS | Facility: CLINIC | Age: 69
End: 2024-08-05
Payer: COMMERCIAL

## 2024-08-20 ENCOUNTER — NURSING HOME VISIT (OUTPATIENT)
Dept: GERIATRICS | Facility: CLINIC | Age: 69
End: 2024-08-20
Payer: COMMERCIAL

## 2024-08-20 DIAGNOSIS — I48.0 PAROXYSMAL ATRIAL FIBRILLATION (H): ICD-10-CM

## 2024-08-20 DIAGNOSIS — Z79.4 TYPE 2 DIABETES MELLITUS TREATED WITH INSULIN (H): Chronic | ICD-10-CM

## 2024-08-20 DIAGNOSIS — I50.20 HFREF (HEART FAILURE WITH REDUCED EJECTION FRACTION) (H): ICD-10-CM

## 2024-08-20 DIAGNOSIS — I10 ESSENTIAL HYPERTENSION: ICD-10-CM

## 2024-08-20 DIAGNOSIS — E11.9 TYPE 2 DIABETES MELLITUS TREATED WITH INSULIN (H): Chronic | ICD-10-CM

## 2024-08-20 DIAGNOSIS — J44.9 CHRONIC OBSTRUCTIVE PULMONARY DISEASE, UNSPECIFIED COPD TYPE (H): ICD-10-CM

## 2024-08-20 DIAGNOSIS — I63.9 CEREBROVASCULAR ACCIDENT (CVA), UNSPECIFIED MECHANISM (H): Primary | ICD-10-CM

## 2024-08-20 PROCEDURE — 99305 1ST NF CARE MODERATE MDM 35: CPT | Performed by: FAMILY MEDICINE

## 2024-08-20 NOTE — LETTER
8/20/2024      Roya Burgos  [address] [place] [address]        No notes on file      Sincerely,        Shari Lerma MD

## 2024-08-21 VITALS
HEIGHT: 63 IN | DIASTOLIC BLOOD PRESSURE: 79 MMHG | BODY MASS INDEX: 35.08 KG/M2 | OXYGEN SATURATION: 98 % | WEIGHT: 198 LBS | HEART RATE: 79 BPM | SYSTOLIC BLOOD PRESSURE: 146 MMHG | RESPIRATION RATE: 16 BRPM | TEMPERATURE: 98 F

## 2024-08-25 VITALS
RESPIRATION RATE: 18 BRPM | DIASTOLIC BLOOD PRESSURE: 77 MMHG | SYSTOLIC BLOOD PRESSURE: 112 MMHG | TEMPERATURE: 97.5 F | WEIGHT: 196 LBS | HEART RATE: 67 BPM | BODY MASS INDEX: 34.73 KG/M2 | OXYGEN SATURATION: 96 % | HEIGHT: 63 IN

## 2024-08-25 NOTE — PROGRESS NOTES
"[facility] GERIATRICS    Chief Complaint   Patient presents with    RECHECK     HPI:  Roya Burgos is a 69 year old  (1955), who is being seen today for an episodic care visit at: [facility] ([facility]) [[id number]].     This is a 68 yo female with PMHx for recent ischemic left MCA stroke c/b cerebral edema s/p decompressive hemicraniectomy c/b hemorrhagic transformation 2/2 therapeutic anticoagulation for atrial fibrillation old complicated by dysarthria and significant right-sided berhane-paralysis, oropharyngeal dysphagia s/p percutaneous gastrostomy tube on tube feeding, chronic migraine headache, paroxysmal atrial fibrillation on aspirin only given brain bleeding, chronic heart failure with mildly reduced ejection fraction, hypertension, type 2 diabetes mellitus, hyperlipidemia, chronic kidney disease stage III A, L parotid gland nodule, Bilateral moderate carotid stenosis, and COPD not on home oxygen. requent hospitalization in [facility], recently discharged on 7/14/2024 for care at home with home visiting nurse service, returned as daughter was unable to care for her at home. PTA Percocet, gabapentin and metformin discontinued. Likely will need LTC placement, discharged to [facility] T[facility] for rehab/placement.     Today's concern is:   TF, headache    Allergies, and PMH/PSH reviewed in [facility] today.  REVIEW OF SYSTEMS:  4 point ROS including Respiratory, CV, GI and , other than that noted in the HPI,  is negative    Objective:   /77   Pulse 67   Temp 97.5  F (36.4  C)   Resp 18   Ht 1.6 m (5' 3\")   Wt 88.9 kg (196 lb)   LMP  (LMP Unknown)   SpO2 96%   BMI 34.72 kg/m    GENERAL APPEARANCE:  in no distress, morbidly obese, periodic HAs  RESP:  no respiratory distress, diminished breath sounds bilaterally, RA  CV:  irregular rhythm per rate controlled A-fib  ABDOMEN:  normal bowel sounds, soft, nontender, no hepatosplenomegaly or other masses, PEG patent  NEURO:   " dense R sided hemiparesis  PSYCH:  insight and judgement impaired, memory impaired     Most Recent 3 CBC's:  Recent Labs   Lab Test 07/31/24  0530 07/19/24  1119 07/18/24  0646   WBC 11.4* 10.4 8.7   HGB 11.6* 10.9* 11.1*   MCV 88 89 87    340 356     Most Recent 3 BMP's:  Recent Labs   Lab Test 07/31/24  0530 07/24/24  1231 07/24/24  0836 07/24/24  0412 07/19/24  1200 07/19/24  1119     --   --  138  --  137   POTASSIUM 4.5  --   --  4.5  --  4.3   CHLORIDE 100  --   --  100  --  102   CO2 24  --   --  25  --  23   BUN 31.4*  --   --  27.9*  --  15.6   CR 1.14*  --   --  1.11*  --  1.04*   ANIONGAP 13  --   --  13  --  12   AMAURY 9.7  --   --  9.5  --  9.4   * 268* 264* 169*   < > 268*    < > = values in this interval not displayed.       Assessment/Plan:    (I69.359) Hemiplegia and hemiparesis following cerebral infarction affecting unspecified side (H)    Recent ischemic left MCA stroke complicated by cerebral edema status post decompressive hemicraniectomy  R sided hemiparesis, continue therapy, follow-up with NSGY (TBD). Last CT negative on 8/23     (Z78.9) On tube feeding diet  Currently using TF TID PRN (if <1/3 use full can, 1/3-2/3 use 1/2 can and >2/3 hold). DD6 diet     (I50.20) HFrEF (heart failure with reduced ejection fraction) (H)  (I50.21) Acute systolic heart failure (H)  A-fib  (I15.0) Renovascular hypertension  EF 46%, continue digoxin 125mcg daily (last level 1.0 on 7/24), not on metoprolol/diltiazem/losartan. -140s, HR <70s. Daily wts    Pain  Continue tylenol 1000mg TID, per HA advised to increase fluid     T2DM  Last A1c 7.4 on 7/16, continue empagliflozin 10mg daily until 1/18/25 and lantus 15U BID and lispro sliding scale. -220s     (E66.01) Obesity, morbid (H)  Last BMI >36     CKD stage 3a  Last Cr 1.14 with GFR 53 on 7/31, recheck BMP on 8/28     (D64.9) Normocytic anemia  Last Hgb 11.6 on 7/31, recheck CBC on 8/28     Mild leukocytosis  Last WBC 11.6 on  7/31, recheck CBC on 8/28     COPD  Continue Dulera 100/5mcg 2 puffs twice daily, room air     (F33.9) Depression, recurrent   Insomnia  Restart amitriptyline 10mg at HS    Orders:  Increase fluids, BMP/CBC    Electronically signed by: Luisito Willis NP

## 2024-08-26 ENCOUNTER — LAB REQUISITION (OUTPATIENT)
Dept: LAB | Facility: CLINIC | Age: 69
End: 2024-08-26
Payer: COMMERCIAL

## 2024-08-26 ENCOUNTER — NURSING HOME VISIT (OUTPATIENT)
Dept: GERIATRICS | Facility: CLINIC | Age: 69
End: 2024-08-26
Payer: COMMERCIAL

## 2024-08-26 DIAGNOSIS — D64.9 NORMOCYTIC ANEMIA: ICD-10-CM

## 2024-08-26 DIAGNOSIS — I69.359 HEMIPLEGIA AND HEMIPARESIS FOLLOWING CEREBRAL INFARCTION AFFECTING UNSPECIFIED SIDE (H): Primary | ICD-10-CM

## 2024-08-26 DIAGNOSIS — I50.20 HFREF (HEART FAILURE WITH REDUCED EJECTION FRACTION) (H): ICD-10-CM

## 2024-08-26 DIAGNOSIS — I15.0 RENOVASCULAR HYPERTENSION: ICD-10-CM

## 2024-08-26 DIAGNOSIS — Z78.9 ON TUBE FEEDING DIET: ICD-10-CM

## 2024-08-26 DIAGNOSIS — I69.351 HEMIPLEGIA AND HEMIPARESIS FOLLOWING CEREBRAL INFARCTION AFFECTING RIGHT DOMINANT SIDE (H): ICD-10-CM

## 2024-08-26 PROCEDURE — 99309 SBSQ NF CARE MODERATE MDM 30: CPT | Performed by: NURSE PRACTITIONER

## 2024-08-26 RX ORDER — ACETAMINOPHEN 500 MG
1000 TABLET ORAL 3 TIMES DAILY
COMMUNITY

## 2024-08-26 RX ORDER — DIGOXIN 125 MCG
125 TABLET ORAL DAILY
COMMUNITY

## 2024-08-26 RX ORDER — AMITRIPTYLINE HYDROCHLORIDE 10 MG/1
10 TABLET ORAL AT BEDTIME
COMMUNITY

## 2024-08-26 NOTE — LETTER
" 8/26/2024      Roya Burgos  [address]  [place] [address]        [facility] GERIATRICS    Chief Complaint   Patient presents with     RECHECK     HPI:  Roya Burgos is a 69 year old  (1955), who is being seen today for an episodic care visit at: [facility] ([facility]) [[id number]].     This is a 68 yo female with PMHx for recent ischemic left MCA stroke c/b cerebral edema s/p decompressive hemicraniectomy c/b hemorrhagic transformation 2/2 therapeutic anticoagulation for atrial fibrillation old complicated by dysarthria and significant right-sided berhane-paralysis, oropharyngeal dysphagia s/p percutaneous gastrostomy tube on tube feeding, chronic migraine headache, paroxysmal atrial fibrillation on aspirin only given brain bleeding, chronic heart failure with mildly reduced ejection fraction, hypertension, type 2 diabetes mellitus, hyperlipidemia, chronic kidney disease stage III A, L parotid gland nodule, Bilateral moderate carotid stenosis, and COPD not on home oxygen. requent hospitalization in [facility], recently discharged on 7/14/2024 for care at home with home visiting nurse service, returned as daughter was unable to care for her at home. PTA Percocet, gabapentin and metformin discontinued. Likely will need LTC placement, [facility].     Today's concern is:   TF, headache    Allergies, and PMH/PSH reviewed in [facility] today.  REVIEW OF SYSTEMS:  4 point ROS including Respiratory, CV, GI and , other than that noted in the HPI,  is negative    Objective:   /77   Pulse 67   Temp 97.5  F (36.4  C)   Resp 18   Ht 1.6 m (5' 3\")   Wt 88.9 kg (196 lb)   LMP  (LMP Unknown)   SpO2 96%   BMI 34.72 kg/m    GENERAL APPEARANCE:  in no distress, morbidly obese, periodic HAs  RESP:  no respiratory distress, diminished breath sounds bilaterally, RA  CV:  irregular rhythm per rate controlled A-fib  ABDOMEN:  normal bowel " sounds, soft, nontender, no hepatosplenomegaly or other masses, PEG patent  NEURO:   dense R sided hemiparesis  PSYCH:  insight and judgement impaired, memory impaired     Most Recent 3 CBC's:  Recent Labs   Lab Test 07/31/24  0530 07/19/24  1119 07/18/24  0646   WBC 11.4* 10.4 8.7   HGB 11.6* 10.9* 11.1*   MCV 88 89 87    340 356     Most Recent 3 BMP's:  Recent Labs   Lab Test 07/31/24  0530 07/24/24  1231 07/24/24  0836 07/24/24  0412 07/19/24  1200 07/19/24  1119     --   --  138  --  137   POTASSIUM 4.5  --   --  4.5  --  4.3   CHLORIDE 100  --   --  100  --  102   CO2 24  --   --  25  --  23   BUN 31.4*  --   --  27.9*  --  15.6   CR 1.14*  --   --  1.11*  --  1.04*   ANIONGAP 13  --   --  13  --  12   AMAURY 9.7  --   --  9.5  --  9.4   * 268* 264* 169*   < > 268*    < > = values in this interval not displayed.       Assessment/Plan:    (I69.359) Hemiplegia and hemiparesis following cerebral infarction affecting unspecified side (H)    Recent ischemic left MCA stroke complicated by cerebral edema status post decompressive hemicraniectomy  R sided hemiparesis, continue therapy, follow-up with NSGY (TBD). Last CT negative on 8/23     (Z78.9) On tube feeding diet  Currently using TF TID PRN (if <1/3 use full can, 1/3-2/3 use 1/2 can and >2/3 hold). DD6 diet     (I50.20) HFrEF (heart failure with reduced ejection fraction) (H)  (I50.21) Acute systolic heart failure (H)  A-fib  (I15.0) Renovascular hypertension  EF 46%, continue digoxin 125mcg daily (last level 1.0 on 7/24), diltiazem 60mg TID, losartan 50mg daily and metoprolol tartrate 100mg BID. -140s, HR <70s. Daily wts    Pain  Continue tylenol 1000mg TID, per HA advised to increase fluid     T2DM  Last A1c 7.4 on 7/16, continue empagliflozin 10mg daily until 1/18/25 and lantus 15U BID and lispro sliding scale. -220s     (E66.01) Obesity, morbid (H)  Last BMI >36     CKD stage 3a  Last Cr 1.14 with GFR 53 on 7/31, recheck BMP on  8/28     (D64.9) Normocytic anemia  Last Hgb 11.6 on 7/31, recheck CBC on 8/28     Mild leukocytosis  Last WBC 11.6 on 7/31, recheck CBC on 8/28     COPD  Continue Dulera 100/5mcg 2 puffs twice daily, room air     (F33.9) Depression, recurrent   Insomnia  No longer on amitriptyline    Orders:  Increase fluids, BMP/CBC    Electronically signed by: Luisito Willis NP         Sincerely,        Luisito Willis NP

## 2024-08-27 ENCOUNTER — TELEPHONE (OUTPATIENT)
Dept: GERIATRICS | Facility: CLINIC | Age: 69
End: 2024-08-27
Payer: COMMERCIAL

## 2024-08-27 NOTE — TELEPHONE ENCOUNTER
"[facility] Triage Nurse Telephone Encounter    Provider: Luisito Willis NP   Facility: [facility]  Facility Type:  LTC    Caller: Tiffani   Call Back Number: [phone]    Allergies:  No Known Allergies     Reason for call: Today the patient is complaining of a headache and backache.  Currently getting Tylenol 1000 mg 3 times a day.  Patient gets Tylenol at 8 AM 4 PM and midnight.   Per the patient she is rating her pain at \"200.\"  Per nursing no facial grimacing, crying out or any signs of extreme pain.   Vitals: BP: 127/76 P:: 75 R:: 16 SPO2: 92% R/A Temp.: 98.0   Currently patient is lying down with lites off.    Verbal Order/Direction given by Provider: Call and update Neurosurgery     Provider giving Order:  Luisito Willis NP     Verbal Order given to: Kirill Wolfe RN      "

## 2024-08-28 ENCOUNTER — LAB REQUISITION (OUTPATIENT)
Dept: LAB | Facility: CLINIC | Age: 69
End: 2024-08-28
Payer: COMMERCIAL

## 2024-08-28 DIAGNOSIS — I69.351 HEMIPLEGIA AND HEMIPARESIS FOLLOWING CEREBRAL INFARCTION AFFECTING RIGHT DOMINANT SIDE (H): ICD-10-CM

## 2024-09-12 ENCOUNTER — PATIENT OUTREACH (OUTPATIENT)
Dept: GERIATRIC MEDICINE | Facility: CLINIC | Age: 69
End: 2024-09-12

## 2024-09-12 PROBLEM — G81.91 RIGHT HEMIPLEGIA (H): Status: ACTIVE | Noted: 2024-05-31

## 2024-09-12 PROBLEM — I48.0 PAROXYSMAL ATRIAL FIBRILLATION (H): Status: ACTIVE | Noted: 2024-05-28

## 2024-09-12 PROBLEM — I69.359 HEMIPLEGIA AND HEMIPARESIS FOLLOWING CEREBRAL INFARCTION AFFECTING UNSPECIFIED SIDE (H): Status: ACTIVE | Noted: 2024-07-05

## 2024-09-12 PROBLEM — Z74.3 NEED FOR CONTINUOUS SUPERVISION: Status: ACTIVE | Noted: 2024-07-05

## 2024-09-12 PROBLEM — Z79.4 TYPE 2 DIABETES MELLITUS TREATED WITH INSULIN (H): Chronic | Status: ACTIVE | Noted: 2022-06-27

## 2024-09-12 PROBLEM — R26.89 IMPAIRED GAIT AND MOBILITY: Status: ACTIVE | Noted: 2024-05-31

## 2024-09-12 PROBLEM — R52 PAIN: Status: ACTIVE | Noted: 2024-05-31

## 2024-09-12 PROBLEM — N18.30 STAGE 3 CHRONIC KIDNEY DISEASE (H): Status: ACTIVE | Noted: 2020-10-23

## 2024-09-12 PROBLEM — R80.9 MICROALBUMINURIA: Status: ACTIVE | Noted: 2020-01-27

## 2024-09-12 PROBLEM — R60.0 LOCALIZED EDEMA: Status: ACTIVE | Noted: 2022-06-27

## 2024-09-12 PROBLEM — I63.9 ISCHEMIC CEREBROVASCULAR ACCIDENT (CVA) (H): Status: ACTIVE | Noted: 2024-05-24

## 2024-09-12 PROBLEM — R62.7 ADULT FAILURE TO THRIVE: Status: ACTIVE | Noted: 2024-07-16

## 2024-09-12 PROBLEM — G93.2 INTRACRANIAL HYPERTENSION: Status: ACTIVE | Noted: 2024-05-24

## 2024-09-12 PROBLEM — I48.92 ATRIAL FLUTTER WITH RAPID VENTRICULAR RESPONSE (H): Status: ACTIVE | Noted: 2024-05-31

## 2024-09-12 PROBLEM — I42.9 CARDIOMYOPATHY (H): Status: ACTIVE | Noted: 2024-05-28

## 2024-09-12 PROBLEM — R41.82 ALTERED MENTAL STATUS: Status: ACTIVE | Noted: 2024-07-07

## 2024-09-12 PROBLEM — I69.959: Status: ACTIVE | Noted: 2024-06-25

## 2024-09-12 PROBLEM — G93.6 CEREBRAL EDEMA (H): Status: ACTIVE | Noted: 2024-06-07

## 2024-09-12 PROBLEM — N04.9 NEPHROTIC SYNDROME: Status: ACTIVE | Noted: 2022-06-28

## 2024-09-12 PROBLEM — I69.320 APHASIA FOLLOWING CEREBROVASCULAR ACCIDENT (CVA): Status: ACTIVE | Noted: 2024-05-31

## 2024-09-12 PROBLEM — R53.1 WEAKNESS: Status: ACTIVE | Noted: 2024-06-25

## 2024-09-12 PROBLEM — G93.5: Status: ACTIVE | Noted: 2024-05-24

## 2024-09-12 PROBLEM — E11.9 TYPE 2 DIABETES MELLITUS TREATED WITH INSULIN (H): Chronic | Status: ACTIVE | Noted: 2022-06-27

## 2024-09-12 PROBLEM — R53.1: Status: ACTIVE | Noted: 2024-05-31

## 2024-09-12 PROBLEM — I48.91 ATRIAL FIBRILLATION WITH RVR (H): Status: ACTIVE | Noted: 2024-05-28

## 2024-09-12 PROBLEM — I50.20 HFREF (HEART FAILURE WITH REDUCED EJECTION FRACTION) (H): Status: ACTIVE | Noted: 2024-06-07

## 2024-09-12 PROBLEM — Z74.01 BED CONFINEMENT STATUS: Status: ACTIVE | Noted: 2024-06-25

## 2024-09-12 PROBLEM — B35.1 ONYCHOMYCOSIS: Status: ACTIVE | Noted: 2020-01-27

## 2024-09-12 PROBLEM — N17.9 ACUTE RENAL FAILURE (H): Status: ACTIVE | Noted: 2024-06-08

## 2024-09-12 PROBLEM — J96.00 ACUTE RESPIRATORY FAILURE (H): Status: ACTIVE | Noted: 2024-05-24

## 2024-09-12 PROBLEM — I63.9: Status: ACTIVE | Noted: 2024-05-31

## 2024-09-12 PROBLEM — R13.10 DYSPHAGIA: Status: ACTIVE | Noted: 2024-05-31

## 2024-09-12 PROBLEM — G93.40 ACUTE ENCEPHALOPATHY: Status: ACTIVE | Noted: 2024-06-07

## 2024-09-12 PROBLEM — N60.01 SOLITARY BENIGN CYST OF RIGHT BREAST: Status: ACTIVE | Noted: 2024-09-12

## 2024-09-12 PROBLEM — Z86.73 RECENT CEREBROVASCULAR ACCIDENT (CVA): Status: ACTIVE | Noted: 2024-06-21

## 2024-09-12 PROBLEM — I61.2 NONTRAUMATIC HEMORRHAGE OF LEFT CEREBRAL HEMISPHERE (H): Status: ACTIVE | Noted: 2024-06-07

## 2024-09-12 PROBLEM — R00.0 SINUS TACHYCARDIA: Status: ACTIVE | Noted: 2024-05-27

## 2024-09-12 PROBLEM — G93.41 METABOLIC ENCEPHALOPATHY: Status: ACTIVE | Noted: 2024-07-13

## 2024-09-12 PROBLEM — E78.5 DYSLIPIDEMIA: Status: ACTIVE | Noted: 2022-06-27

## 2024-09-12 PROBLEM — E66.01 MORBID OBESITY (H): Status: ACTIVE | Noted: 2024-05-30

## 2024-09-12 PROBLEM — Z79.891 LONG TERM CURRENT USE OF OPIATE ANALGESIC: Status: ACTIVE | Noted: 2018-09-10

## 2024-09-12 PROBLEM — I50.21 ACUTE SYSTOLIC HEART FAILURE (H): Status: ACTIVE | Noted: 2024-06-21

## 2024-09-12 PROBLEM — D64.9 ACUTE ON CHRONIC ANEMIA: Status: ACTIVE | Noted: 2024-06-21

## 2024-09-12 PROBLEM — R51.9 NONINTRACTABLE HEADACHE: Status: ACTIVE | Noted: 2024-06-26

## 2024-09-12 PROBLEM — I61.1 NONTRAUMATIC CORTICAL HEMORRHAGE OF LEFT CEREBRAL HEMISPHERE (H): Status: ACTIVE | Noted: 2024-06-07

## 2024-09-12 PROBLEM — R41.89 IMPAIRED COGNITION: Status: ACTIVE | Noted: 2024-05-31

## 2024-09-12 PROBLEM — I69.991 DYSPHAGIA FOLLOWING CEREBROVASCULAR DISEASE: Status: ACTIVE | Noted: 2024-05-31

## 2024-09-12 PROBLEM — M62.81 GENERALIZED MUSCLE WEAKNESS: Status: ACTIVE | Noted: 2024-06-30

## 2024-09-12 PROBLEM — M25.50 PAIN IN JOINT: Status: ACTIVE | Noted: 2022-06-27

## 2024-09-12 PROBLEM — E66.9 OBESITY (BMI 30-39.9): Status: ACTIVE | Noted: 2024-09-12

## 2024-09-12 PROBLEM — F33.9 DEPRESSION, RECURRENT (H): Status: ACTIVE | Noted: 2024-04-17

## 2024-09-12 NOTE — PROGRESS NOTES
[facility] Care Coordination Contact    Member became effective with [facility] on 9/1/2024 with [facility].  Previous Health Plan: [employer]  Previous Care System: [facility]  Previous care coordinators name and number: Tanya Cruz Type: N/A  UTF received: No: Requested on 9/12  Mailed welcome letter and Mailed [employer] Leave Behind document  Address/Phone discrepancy: NA  [employer]:  GLORY Issa  Care Management Specialist   [facility]   [phone]

## 2024-09-12 NOTE — LETTER
September 12, 2024    Important [employer] Information    SHARIF MUSTAFA  C/O DAVID MUSTAFA  [address] [place] [id number]    Your New Care Coordinator  Dear Sharif,  My name is CHHAYA Cox, [facility]  and I am your new Care Coordinator. You may reach me by calling [phone]. I will be in touch with you shortly to address any questions you may have.   I have also been in contact with your previous care coordinator, to ensure a smooth transition.  Questions?  Call me at [phone] Monday-Friday between 8am and 5pm. TTY: [phone]. I look forward to working with you as a [employer]  member.  Sincerely,    CHHAYA Cox [facility]    E-mail: [email or web address]  Phone: [phone]      [facility]    cc: member records

## 2024-09-20 ENCOUNTER — TELEPHONE (OUTPATIENT)
Dept: GERIATRICS | Facility: CLINIC | Age: 69
End: 2024-09-20
Payer: COMMERCIAL

## 2024-09-20 NOTE — TELEPHONE ENCOUNTER
"[facility] Triage Nurse Telephone Encounter    Provider: ANNE Gupta  Facility: [facility]  Facility Type:  LT    Caller: Suzanne  Call Back Number: [phone]    Allergies:  No Known Allergies     Reason for call: Pt has a low grade fever of 99.4, pain rating at 9/10 \"all over\" and has double vision. she is unable to accurately see how many fingers are being held up in front of her. Rest of her vitals are: 117/75, 85, 95%RA, 16. No urinary or respiratory symptoms. COVID negative.    Verbal Order/Direction given by Provider:   Obtain UA/UC    Provider giving Order:  ANNE Gupta    Verbal Order given to: Bowen Collazo RN      "

## 2024-09-23 ENCOUNTER — PATIENT OUTREACH (OUTPATIENT)
Dept: GERIATRIC MEDICINE | Facility: CLINIC | Age: 69
End: 2024-09-23

## 2024-09-23 ENCOUNTER — NURSING HOME VISIT (OUTPATIENT)
Dept: GERIATRICS | Facility: CLINIC | Age: 69
End: 2024-09-23
Payer: COMMERCIAL

## 2024-09-23 VITALS
RESPIRATION RATE: 16 BRPM | SYSTOLIC BLOOD PRESSURE: 112 MMHG | HEIGHT: 63 IN | HEART RATE: 60 BPM | WEIGHT: 195 LBS | BODY MASS INDEX: 34.55 KG/M2 | DIASTOLIC BLOOD PRESSURE: 68 MMHG | OXYGEN SATURATION: 96 % | TEMPERATURE: 98.1 F

## 2024-09-23 DIAGNOSIS — I69.359 HEMIPLEGIA AND HEMIPARESIS FOLLOWING CEREBRAL INFARCTION AFFECTING UNSPECIFIED SIDE (H): ICD-10-CM

## 2024-09-23 DIAGNOSIS — Z79.4 TYPE 2 DIABETES MELLITUS TREATED WITH INSULIN (H): ICD-10-CM

## 2024-09-23 DIAGNOSIS — E11.9 TYPE 2 DIABETES MELLITUS TREATED WITH INSULIN (H): ICD-10-CM

## 2024-09-23 DIAGNOSIS — D64.9 NORMOCYTIC ANEMIA: ICD-10-CM

## 2024-09-23 DIAGNOSIS — U07.1 INFECTION DUE TO 2019 NOVEL CORONAVIRUS: Primary | ICD-10-CM

## 2024-09-23 DIAGNOSIS — Z78.9 ON TUBE FEEDING DIET: ICD-10-CM

## 2024-09-23 PROCEDURE — 99309 SBSQ NF CARE MODERATE MDM 30: CPT | Performed by: NURSE PRACTITIONER

## 2024-09-23 NOTE — LETTER
" 9/23/2024      Roya Burgos  C/o Miles Aubrey  [address]  [place] [address]        [facility] GERIATRICS    Chief Complaint   Patient presents with     RECHECK     HPI:  Roya Burgos is a 69 year old  (1955), who is being seen today for an episodic care visit at: [facility] ([facility]) [[id number]].     HPI: Roya has a history of ischemic left MCA stroke with cerebral edema status post decompressive hemicraniotomy and complications with significant right sided hemiparalysis and dysphagia.  She does have a PEG tube in place.  Chronic headache now in A-fib on aspirin only.  Also CKD, hyperlipidemia, type 2 diabetes.  She had returned to start any Kori in long-term care when daughter was unable to care for her at home.    Today's concern is: COVID-19.  Roya had complaints of allover body aches and low-grade fever on Friday, initial test was negative however repeat COVID-19 testing was positive that afternoon.  She was started on molnupiravir and subsequently improved in regards to body aches and pains.  Today she is doing well, no shortness of breath or cough.  She reports that she is feeling fine.      Allergies, and PMH/PSH reviewed in EPIC today.  REVIEW OF SYSTEMS:  4 point ROS including Respiratory, CV, GI and , other than that noted in the HPI,  is negative    Objective:   /68   Pulse 60   Temp 98.1  F (36.7  C)   Resp 16   Ht 1.6 m (5' 3\")   Wt 88.5 kg (195 lb)   SpO2 96%   BMI 34.54 kg/m    GENERAL APPEARANCE:  in no distress, morbidly obese, periodic HAs  RESP:  no respiratory distress, diminished breath sounds bilaterally, RA  CV:  irregular rhythm per rate controlled A-fib  ABDOMEN:  normal bowel sounds, soft, nontender, no hepatosplenomegaly or other masses, PEG patent  NEURO:   dense R sided hemiparesis  PSYCH:  insight and judgement impaired, memory impaired     Most Recent 3 CBC's:  Recent Labs   Lab Test 07/31/24  0530 07/19/24  1119 " 07/18/24  0646   WBC 11.4* 10.4 8.7   HGB 11.6* 10.9* 11.1*   MCV 88 89 87    340 356     Most Recent 3 BMP's:  Recent Labs   Lab Test 07/31/24  0530 07/24/24  1231 07/24/24  0836 07/24/24  0412 07/19/24  1200 07/19/24  1119     --   --  138  --  137   POTASSIUM 4.5  --   --  4.5  --  4.3   CHLORIDE 100  --   --  100  --  102   CO2 24  --   --  25  --  23   BUN 31.4*  --   --  27.9*  --  15.6   CR 1.14*  --   --  1.11*  --  1.04*   ANIONGAP 13  --   --  13  --  12   AMAURY 9.7  --   --  9.5  --  9.4   * 268* 264* 169*   < > 268*    < > = values in this interval not displayed.       Assessment/Plan:    COVID-19: positive test identified 9/20.   -droplet and airborne precuations.  -Continue molnopurivir (ordered on Friday 9/20).   -O2 1-4 L via NC to keep sats >88%.   -Continue supportive treatmentwith guaifenesin, albuterol inhalers, encourage p.o. intake.    (I69.359) Hemiplegia and hemiparesis following cerebral infarction affecting unspecified side (H)    Recent ischemic left MCA stroke complicated by cerebral edema status post decompressive hemicraniectomy  R sided hemiparesis, continue therapy, follow-up with NSGY. Last CT negative on 8/23     (Z78.9) On tube feeding diet  Currently using TF TID PRN (if <1/3 use full can, 1/3-2/3 use 1/2 can and >2/3 hold). DD6 diet     Pain  Continue tylenol 1000mg TID, per HA advised to increase fluid     T2DM  Last A1c 7.4 on 7/16, continue empagliflozin 10mg daily until 1/18/25 and lantus 15U BID and lispro sliding scale. -220s     (E66.01) Obesity, morbid (H)  Last BMI >36     CKD stage 3a  Last Cr 1.33. Follow clinically.      (D64.9) Normocytic anemia  Last Hgb 12.8.      COPD  Continue Dulera 100/5mcg 2 puffs twice daily, room air     (F33.9) Depression, recurrent   Insomnia  Restart amitriptyline 10mg at HS    Orders:  -Continue molnopurvir. Update for any acute changes to status.     Electronically signed by: Yesenia Blount CNP                  Sincerely,        Yesenia Blount, CNP

## 2024-09-23 NOTE — PROGRESS NOTES
[facility] Care Coordination Contact    Was scheduled to see Roya on this date, and was notified by facility she has COVID and they would prefer CC to reschedule visit as facility is also having a breakout and they are wanting to keep visitors to a minimum.  Rescheduled for next week 9/30.    CHHAYA Cox, [facility]  [phone]        
03-Nov-2021 10:37

## 2024-09-23 NOTE — PROGRESS NOTES
"[facility] GERIATRICS    Chief Complaint   Patient presents with    RECHECK     HPI:  Roya Burgos is a 69 year old  (1955), who is being seen today for an episodic care visit at: [facility] ([facility]) [[id number]].     HPI: Roya has a history of ischemic left MCA stroke with cerebral edema status post decompressive hemicraniotomy and complications with significant right sided hemiparalysis and dysphagia.  She does have a PEG tube in place.  Chronic headache now in A-fib on aspirin only.  Also CKD, hyperlipidemia, type 2 diabetes.  She had returned to start any Kori in long-term care when daughter was unable to care for her at home.    Today's concern is: COVID-19.  Roya had complaints of allover body aches and low-grade fever on Friday, initial test was negative however repeat COVID-19 testing was positive that afternoon.  She was started on molnupiravir and subsequently improved in regards to body aches and pains.  Today she is doing well, no shortness of breath or cough.  She reports that she is feeling fine.      Allergies, and PMH/PSH reviewed in [facility] today.  REVIEW OF SYSTEMS:  4 point ROS including Respiratory, CV, GI and , other than that noted in the HPI,  is negative    Objective:   /68   Pulse 60   Temp 98.1  F (36.7  C)   Resp 16   Ht 1.6 m (5' 3\")   Wt 88.5 kg (195 lb)   SpO2 96%   BMI 34.54 kg/m    GENERAL APPEARANCE:  in no distress, morbidly obese, periodic HAs  RESP:  no respiratory distress, diminished breath sounds bilaterally, RA  CV:  irregular rhythm per rate controlled A-fib  ABDOMEN:  normal bowel sounds, soft, nontender, no hepatosplenomegaly or other masses, PEG patent  NEURO:   dense R sided hemiparesis  PSYCH:  insight and judgement impaired, memory impaired     Most Recent 3 CBC's:  Recent Labs   Lab Test 07/31/24  0530 07/19/24  1119 07/18/24  0646   WBC 11.4* 10.4 8.7   HGB 11.6* 10.9* 11.1*   MCV 88 89 87    340 356     Most Recent 3 " BMP's:  Recent Labs   Lab Test 07/31/24  0530 07/24/24  1231 07/24/24  0836 07/24/24  0412 07/19/24  1200 07/19/24  1119     --   --  138  --  137   POTASSIUM 4.5  --   --  4.5  --  4.3   CHLORIDE 100  --   --  100  --  102   CO2 24  --   --  25  --  23   BUN 31.4*  --   --  27.9*  --  15.6   CR 1.14*  --   --  1.11*  --  1.04*   ANIONGAP 13  --   --  13  --  12   AMAURY 9.7  --   --  9.5  --  9.4   * 268* 264* 169*   < > 268*    < > = values in this interval not displayed.       Assessment/Plan:    COVID-19: positive test identified 9/20.   -droplet and airborne precuations.  -Continue molnopurivir (ordered on Friday 9/20).   -O2 1-4 L via NC to keep sats >88%.   -Continue supportive treatmentwith guaifenesin, albuterol inhalers, encourage p.o. intake.    (I69.359) Hemiplegia and hemiparesis following cerebral infarction affecting unspecified side (H)    Recent ischemic left MCA stroke complicated by cerebral edema status post decompressive hemicraniectomy  R sided hemiparesis, continue therapy, follow-up with NSGY. Last CT negative on 8/23     (Z78.9) On tube feeding diet  Currently using TF TID PRN (if <1/3 use full can, 1/3-2/3 use 1/2 can and >2/3 hold). DD6 diet     Pain  Continue tylenol 1000mg TID, per HA advised to increase fluid     T2DM  Last A1c 7.4 on 7/16, continue empagliflozin 10mg daily until 1/18/25 and lantus 15U BID and lispro sliding scale. -220s     (E66.01) Obesity, morbid (H)  Last BMI >36     CKD stage 3a  Last Cr 1.33. Follow clinically.      (D64.9) Normocytic anemia  Last Hgb 12.8.      COPD  Continue Dulera 100/5mcg 2 puffs twice daily, room air     (F33.9) Depression, recurrent   Insomnia  Restart amitriptyline 10mg at HS    Orders:  -Continue molnopurvir. Update for any acute changes to status.     Electronically signed by: Yesenia Blount CNP

## 2024-09-23 NOTE — PROGRESS NOTES
[facility] GERIATRICS  [place] Medical Record Number:  [id number]  Place of Service where encounter took place: [facility] () [[id number]]  CODE STATUS:   CPR/Full code     Chief Complaint/Reason for Visit:  Chief Complaint   Patient presents with    group home Regulatory       HPI:    Roya Burgos is a 69 year old female with hospitalization for stroke as noted below.     [facility]  Hospitalist Discharge Summary    Date of Admission:  7/16/2024  Date of Discharge:  7/24/2024     Hospital Course  Roya Burgos is a 69 year old female admitted on 7/16/2024 for evaluation of AMS. She has PMHx of recent ischemic left MCA stroke c/b cerebral edema s/p decompressive hemicraniectomy c/b hemorrhagic transformation 2/2 therapeutic anticoagulation for atrial fibrillation old complicated by dysarthria and significant right-sided berhane-paralysis, oropharyngeal dysphagia s/p percutaneous gastrostomy tube on tube feeding, chronic migraine headache, paroxysmal atrial fibrillation on aspirin only given brain bleeding, chronic heart failure with mildly reduced ejection fraction, hypertension, type 2 diabetes mellitus, hyperlipidemia, chronic kidney disease stage III A, L parotid gland nodule, Bilateral moderate carotid stenosis, and COPD not on home oxygen.  The patient had frequent hospitalization in [facility] and was recently discharged on 7/14/2024 for care at home with home visiting nurse service, but returned as daughter was unable to care for her at home. Hemodynamically and clinically stable.    Migraine headache  Per chart review, patient has been on percocet (10-325mg TID) since 09/2014, but indications are unclear.  - hold PTA amitriptyline, noted to be sedated 7/18 AM > resume on 7/19  - tylenol PRN     Afib  Digoxin levels wnl. Pt w/ afib w/ RVR earlier on 7/17 w/out home meds ordered, otherwise HDS. Will resume home meds  - PTA  metoprolol tartrate 100 mg BID, diltiazem 60 mgs Q8h, digoxin 125mcg Daily   - telemetry  - pt not on AC 2/2 hemorrhagic stroke hx     AMS, resolved  Reported to be agitated and not herself. Labs obtained on admission thus far unrevealing (CXR negative, UA neg, digoxin level wnl, CT head neg). VBG w/ no hypercarbia, LA mildly elevated at 2.1, now 1.9 (7/18). Cdiff and enteric panel neg. Covid neg. CRP of 31 on admission and downtrending to 28 on 7/18. Ammonia wnl. Blood cultures pending. On our interview on 7/17, pt is conversant and does not appear altered. It appears that she has returned back to baseline.  -Holding off on antibiotics given lack of evidence of sepsis and lack of clear infectious source  -Neurochecks every 4 hours for the first 48 hours of admission  -Requested a pharmacy consult to review etiologies for acute toxic encephalopathy.              - No changes needed after review of meds.             Recent ischemic left MCA stroke c/b cerebral edema s/p decompressive hemicraniectomy c/b hemorrhagic transformation secondary to therapeutic anticoagulation  Dysarthria   Significant right-sided berhane-paralysis  oropharyngeal dysphagia s/p percutaneous gastrostomy tube on tube feeding  -Will resume aspirin once we ensure there is no active brain bleeding- pt reports not taking, and discharge summaries indicate discontinuation, will attempt verification w/ pharmacy   -Requested dietitian consult to resume tube feeding.  - SPL consulted.              - Initiate pureed (IDDSI 4) diet and thin liquids (7/17) and advanced to IDDSI 5 (7/18) with thin liquids.              - SLP will follow for dysphagia management and trialing advanced solids when pt's dentures are present.  -PTA Atorvastatin.  -The patient will need neurosurgery follow-up in 6 weeks based on documentation from prior hospital (appears to be scheduled August 2024)     Acute kidney injury, akin stage I on top of CKD stage III, resolved   Cr  baseline 1-1.5 (2024) and Cr 1.5 on admission. BUN/Cr ~19, likely prerenal. Now improved on 7/18 with Cr 1.08 and BUN 17.5.  - Strict intake and output and daily body weight  - Resume PTA losartan given stable Cr on 7/19.     Chronic heart failure with mildly reduced ejection fraction LVEF 46%TTE from an outside hospital (6/6/24):  1. Limited echo.  2. Mild LV enlargement with mildly increased wall thickness. Calculated biplane LVEF 46%. Mild global hypokinesis. No LV thrombus.  3. Mild RV enlargement with mildly decreased systolic function. Estimated PASP 25 mmHg + RA Pressure.   -Consider switching metoprolol tartrate to metoprolol succinate prior to discharge for HF   -Consider switching losartan to valsartan once acute kidney injury resolves then eventually initiation of Entresto  - resumed PTA empagliflozin     T2DM, Uncontrolled (7/15/2024 @ 7.4)  Hemoglobin A1c at [facility] was 9.2 on 4/4/2024. Repeat on 7/15 7.4   - resume PTA empagloflozin, lantus 8 units  - Started insulin NovoLog coverage  - Will hold off on metformin in setting of acute kidney injury  - Holding PTA gabapentin 300 mg 3 times daily due to ?sedation on 7/18 AM      Uncontrolled hypertension  - Resume PTA losartan (7/19-xx).  - resume PTA metoprolol as above      COPD not on home oxygen  Breathing comfortably on RA  - resume PTA inhaler (fluticasone vilanterol)  - Bill PRN      Need for placement into a transitional care unit versus long-term care  Daughter Miles on phone and discussed that she is agreeable for placement for patient as she was unable to care for her at home due to her medical needs.   - Case management/social work consulted     Overall stabilized and discharged to TCU on 7/24/2024 for PT, OT, nursing cares, medical management and monitoring.     Following TCU therapies, moved to LTC bed.       REVIEW OF SYSTEMS:  All others negative other than those noted in HPI.      PAST MEDICAL HISTORY:  Past Medical History:    Diagnosis Date    Diabetes mellitus, type II, insulin dependent (H)     HTN (hypertension)     Major depression        PAST SURGICAL HISTORY:  Past Surgical History:   Procedure Laterality Date    ANKLE SURGERY      IR LOWER EXTREMITY ANGIOGRAM LEFT  2016    IR LOWER EXTREMITY ANGIOGRAM LEFT  2/3/2016       FAMILY HISTORY:  Family History   Problem Relation Age of Onset    Diabetes Sister     Diabetes Sister     Diabetes Brother     Asthma Mother          age 37    Cancer - colorectal Sister 52       SOCIAL HISTORY:  Social History     Socioeconomic History    Marital status: Single     Spouse name: Not on file    Number of children: Not on file    Years of education: Not on file    Highest education level: Not on file   Occupational History    Not on file   Tobacco Use    Smoking status: Not on file    Smokeless tobacco: Not on file   Substance and Sexual Activity    Alcohol use: No    Drug use: No    Sexual activity: Not on file   Other Topics Concern    Not on file   Social History Narrative    On disability, due to depression. Former nursing assistant in hospitals and NH. Has two daughters, one lives in [place] and one lives in [place].     Social Determinants of Health     Financial Resource Strain: Low Risk  (2024)    [facility]    Financial Resource Strain     Difficulty of Paying Living Expenses: 3     Difficulty of Paying Living Expenses: Not on file   Food Insecurity: No Food Insecurity (2024)    Received from [employer]    Hunger Vital Sign     Worried About Running Out of Food in the Last Year: Never true     Ran Out of Food in the Last Year: Never true   Transportation Needs: No Transportation Needs (2024)    Received from [employer]    PRAPARE - Transportation     Lack of Transportation (Medical): No     Lack of Transportation (Non-Medical): No   Physical Activity: Not on File (2022)    Received from [facility]     Physical Activity     Physical Activity: 0   Stress: Not on File (6/27/2022)    Received from [facility]    Stress     Stress: 0   Social Connections: Socially Integrated (1/25/2024)    Received from [employer] [facility]    Social Connections     Frequency of Communication with Friends and Family: 0   Interpersonal Safety: Unknown (8/23/2024)    Received from [facility]    Humiliation, Afraid, Rape, and Kick questionnaire     Fear of Current or Ex-Partner: Not on file     Emotionally Abused: No     Physically Abused: No     Sexually Abused: No   Housing Stability: Low Risk  (7/6/2024)    Received from [facility]    Housing Stability Vital Sign     Unable to Pay for Housing in the Last Year: No     Number of Places Lived in the Last Year: 1     Unstable Housing in the Last Year: No       MEDICATIONS:  Current Outpatient Medications   Medication Sig Dispense Refill    acetaminophen (TYLENOL) 500 MG tablet Take 1,000 mg by mouth 3 times daily.      albuterol (PROAIR HFA/PROVENTIL HFA/VENTOLIN HFA) 108 (90 Base) MCG/ACT inhaler Inhale 2 puffs into the lungs every 4 hours as needed for shortness of breath, wheezing or cough 18 g 0    amitriptyline (ELAVIL) 10 MG tablet Take 10 mg by mouth at bedtime.      digoxin (LANOXIN) 125 MCG tablet Take 125 mcg by mouth daily.      empagliflozin (JARDIANCE) 10 MG TABS tablet Take 1 tablet (10 mg) by mouth or Feeding Tube daily for 180 days (Patient taking differently: Take 10 mg by mouth or Feeding Tube daily. Complete on 1/18/25) 90 tablet 1    insulin glargine (LANTUS PEN) 100 UNIT/ML pen Inject 8 Units subcutaneously at bedtime for 188 days (Patient taking differently: Inject 15 Units subcutaneously 2 times daily At AM and HS) 15 mL 0    insulin lispro (HUMALOG) 100 UNIT/ML vial Inject 1-3 Units subcutaneously 3 times daily (before meals) Correction Scale - LOW INSULIN RESISTANCE DOSING Do Not give Correction Insulin if Pre-Meal BG less than 140. For  "Pre-Meal  - 239 give 1 unit. For Pre-Meal  - 339 give 2 units. For Pre-Meal BG greater than or equal to 340 give 3 units. To be given with prandial insulin, and based on pre-meal blood glucose. Administering insulin within 5 minutes of the start of the meal is ideal. Administer insulin no more than 30 minutes after the start of the meal, unless directed otherwise by provider. Notify provider if glucose greater than or equal to 350 mg/dL after administration of correction dose. 10 mL 0    insulin lispro (HUMALOG) 100 UNIT/ML vial Inject 1-3 Units subcutaneously at bedtime LOW INSULIN RESISTANCE DOSING Do Not give Bedtime Correction Insulin if BG less than 200. For  - 299 give 1 unit. For  - 399 give 2 units For BG greater than or equal 400 give 3 units. Notify provider if glucose greater than or equal to 350 mg/dL after administration of correction dose 10 mL 0    mometasone-formoterol (DULERA) 100-5 MCG/ACT inhaler Inhale 2 puffs into the lungs 2 times daily.          ALLERGIES:  No Known Allergies      PHYSICAL EXAM:  General: Patient is alert female, no distress.    Vitals: BP (!) 146/79   Pulse 79   Temp 98  F (36.7  C)   Resp 16   Ht 1.6 m (5' 3\")   Wt 89.8 kg (198 lb)   LMP  (LMP Unknown)   SpO2 98%   BMI 35.07 kg/m    HEENT: Head shows craniectomy scar.. Eyes show no injection or icterus. Nares negative. Oropharynx well hydrated.  Neck: Supple. No tenderness or adenopathy. No JVD.  Lungs: Clear bilaterally. No wheezes.  Cardiovascular: Regular rate and rhythm, normal S1. S2.  Back: No spinal or CVA tenderness.  Abdomen: Soft, no tenderness on exam. GT present. Bowel sounds present. No guarding rebound or rigidity.  : Deferred.  Extremities: No edema is noted.  Musculoskeletal: Degen changes. Right hemiparesis.   Skin: Warm and dry.   Psych: Mood appears good.      LABS/DIAGNOSTIC DATA:  Component      Latest Ref Rng 7/19/2024  11:19 AM 7/31/2024  5:30 AM   WBC      4.0 - 11.0 " 10e3/uL 10.4  11.4 (H)    RBC Count      3.80 - 5.20 10e6/uL 4.13  4.51    Hemoglobin      11.7 - 15.7 g/dL 10.9 (L)  11.6 (L)    Hematocrit      35.0 - 47.0 % 36.7  39.7    MCV      78 - 100 fL 89  88    MCH      26.5 - 33.0 pg 26.4 (L)  25.7 (L)    MCHC      31.5 - 36.5 g/dL 29.7 (L)  29.2 (L)    RDW      10.0 - 15.0 % 16.0 (H)  17.1 (H)    Platelet Count      150 - 450 10e3/uL 340  333       Component      Latest Ref Rng 7/24/2024  4:12 AM 7/31/2024  5:30 AM   Sodium      135 - 145 mmol/L 138  137    Potassium      3.4 - 5.3 mmol/L 4.5  4.5    Carbon Dioxide (CO2)      22 - 29 mmol/L 25  24    Anion Gap      7 - 15 mmol/L 13  13    Urea Nitrogen      8.0 - 23.0 mg/dL 27.9 (H)  31.4 (H)    Creatinine      0.51 - 0.95 mg/dL 1.11 (H)  1.14 (H)    GFR Estimate      >60 mL/min/1.73m2 54 (L)  53 (L)    Calcium      8.8 - 10.4 mg/dL 9.5  9.7    Chloride      98 - 107 mmol/L 100  100           ASSESSMENT/PLAN:  Stroke. Left MCA distribution. S/p decompressive hemicraniectomy due to cerebral edema, and hemorrhagic transformation. Resultant right hemiplegia, dysphagia, on bolus Tube feeding. Continue statin. Dietician and speech therapy following.   Afib. Not on anticoagulation given brain bleeding. On digoxin for rate control.   HTN. Meds losartan, metoprolol, diltiazem just discontinued. Monitor Bps closely.   CHF. Reduced EF. Not on diuretics. Follow up with cardiology.  DM. On empagloflozin, lantus, and lispro insulin. Accuchecks followed.   COPD. Not on home oxygen, respiratory status is stable.   Anemia. Mild. Labs as noted, reviewed.  HLD. On statin.   CKD. Labs as noted above, reviewed.  Migraine headaches.   Code status is full code.      Electronically signed by: Shari Lerma MD

## 2024-09-30 ENCOUNTER — PATIENT OUTREACH (OUTPATIENT)
Dept: GERIATRIC MEDICINE | Facility: CLINIC | Age: 69
End: 2024-09-30
Payer: COMMERCIAL

## 2024-09-30 NOTE — Clinical Note
Sukumar Kearns!  I met with Roya yesterday, she didn't say much but we had a nice visit :)  Let me know if there is anything I should know, thank you!  CHHAYA Cox, [facility] [phone]

## 2024-10-01 ASSESSMENT — ACTIVITIES OF DAILY LIVING (ADL)
DEPENDENT_IADLS:: CLEANING;COOKING;LAUNDRY;SHOPPING;MEAL PREPARATION;MEDICATION MANAGEMENT;MONEY MANAGEMENT;TRANSPORTATION;INCONTINENCE

## 2024-10-01 NOTE — PROGRESS NOTES
[facility] Care Coordination Contact  [facility] Institutional Initial Assessment     Institutional Assessment for Health Risk Assessment with Roya Burgos completed on September 30, 2024 at [facility]    Type of residence:: Nursing home  Current living arrangement:: I live in a nursing home     Assessment completed with:: Patient, Care Team Member    Mental/Behavioral Health   Depression Screening:   PHQ-2 Total Score (Adult) - Positive if 3 or more points; Administer PHQ-9 if positive: 0     Mental health DX:: Yes   Mental health DX how managed:: Medication    Falls Assessment:   Fallen 2 or more times in the past year?: No   Any fall with injury in the past year?: No    ADL/IADL Dependencies:   Dependent ADLs:: Bathing, Dressing, Eating, Grooming, Incontinence, Positioning, Transfers, Wheelchair-with assist, Toileting  Dependent IADLs:: Cleaning, Cooking, Laundry, Shopping, Meal Preparation, Medication Management, Money Management, Transportation, Incontinence    Care Plan & Recommendations: Met with Roya in her room on this date.  She said she has been doing well, and said she likes it at [facility].  Spoke with [facility]  Rylee on this date, she said that Roya has been stable.  She did try going home overnight with her daughter last week and Rylee said it did not go well, and family was reaffirmed that this is the best living situation for Roya.   Discussed options/opportunities for transitions.    See Institutional Care Plan for detailed assessment information.    Obtained a copy of the facility care plan and MDS from facility electronic records. Requested of [facility] social worker to put this care coordinator on care conference attendee list.    Placed the [facility] facility face sheet in the member's facility chart.    Follow-Up Plan: Member informed of future contact, plan to f/u with member with a 6 month assessment, attend 1 care conference  annually, and will follow any hospitalizations or transitions. Care Coordinator contact information shared with member/family and facility, and encouraged to call this care coordinator with any questions or concerns at any time.     [facility] care continuum providers: Please see Snapshot and Care Management Flowsheets for Specific details of care plan.    This CC note routed to PCP, Yesenia Blount.    CHHAYA Cox, [facility]  [phone]

## 2024-10-10 ENCOUNTER — NURSING HOME VISIT (OUTPATIENT)
Dept: GERIATRICS | Facility: CLINIC | Age: 69
End: 2024-10-10
Payer: COMMERCIAL

## 2024-10-10 VITALS
RESPIRATION RATE: 16 BRPM | OXYGEN SATURATION: 95 % | BODY MASS INDEX: 33.45 KG/M2 | WEIGHT: 188.8 LBS | SYSTOLIC BLOOD PRESSURE: 134 MMHG | TEMPERATURE: 98.3 F | HEART RATE: 75 BPM | HEIGHT: 63 IN | DIASTOLIC BLOOD PRESSURE: 75 MMHG

## 2024-10-10 DIAGNOSIS — Z79.4 TYPE 2 DIABETES MELLITUS TREATED WITH INSULIN (H): ICD-10-CM

## 2024-10-10 DIAGNOSIS — I10 ESSENTIAL HYPERTENSION: ICD-10-CM

## 2024-10-10 DIAGNOSIS — I50.20 HFREF (HEART FAILURE WITH REDUCED EJECTION FRACTION) (H): ICD-10-CM

## 2024-10-10 DIAGNOSIS — E11.9 TYPE 2 DIABETES MELLITUS TREATED WITH INSULIN (H): ICD-10-CM

## 2024-10-10 DIAGNOSIS — I48.0 PAROXYSMAL ATRIAL FIBRILLATION (H): ICD-10-CM

## 2024-10-10 DIAGNOSIS — I63.9 CEREBROVASCULAR ACCIDENT (CVA), UNSPECIFIED MECHANISM (H): Primary | ICD-10-CM

## 2024-10-10 PROCEDURE — 99309 SBSQ NF CARE MODERATE MDM 30: CPT | Performed by: FAMILY MEDICINE

## 2024-10-10 NOTE — LETTER
10/10/2024      Roya Burgos  C/o Miles Burgos  1994 [facility] [address]  [facility] [address]        [facility] GERIATRICS  [place] Medical Record Number:  [id number]  Place of Service where encounter took place: General acute hospital () [[id number]]  CODE STATUS:   CPR/Full code     Chief Complaint/Reason for Visit:  Chief Complaint   Patient presents with     half-way Regulatory     LTC 10/10/2024.        HPI:    Roya Burgos is a 69 year old female with hospitalization July 2024 for stroke as noted below.     [facility]  Hospitalist Discharge Summary    Date of Admission:  7/16/2024  Date of Discharge:  7/24/2024     Hospital Course  Roya Burgos is a 69 year old female admitted on 7/16/2024 for evaluation of AMS. She has PMHx of recent ischemic left MCA stroke c/b cerebral edema s/p decompressive hemicraniectomy c/b hemorrhagic transformation 2/2 therapeutic anticoagulation for atrial fibrillation old complicated by dysarthria and significant right-sided berhane-paralysis, oropharyngeal dysphagia s/p percutaneous gastrostomy tube on tube feeding, chronic migraine headache, paroxysmal atrial fibrillation on aspirin only given brain bleeding, chronic heart failure with mildly reduced ejection fraction, hypertension, type 2 diabetes mellitus, hyperlipidemia, chronic kidney disease stage III A, L parotid gland nodule, Bilateral moderate carotid stenosis, and COPD not on home oxygen.  The patient had frequent hospitalization in [facility] and was recently discharged on 7/14/2024 for care at home with home visiting nurse service, but returned as daughter was unable to care for her at home. Hemodynamically and clinically stable.    Migraine headache  Per chart review, patient has been on percocet (10-325mg TID) since 09/2014, but indications are unclear.  - hold PTA amitriptyline, noted to be sedated 7/18 AM > resume on 7/19  - tylenol  PRN     Afib  Digoxin levels wnl. Pt w/ afib w/ RVR earlier on 7/17 w/out home meds ordered, otherwise HDS. Will resume home meds  - PTA metoprolol tartrate 100 mg BID, diltiazem 60 mgs Q8h, digoxin 125mcg Daily   - telemetry  - pt not on AC 2/2 hemorrhagic stroke hx     AMS, resolved  Reported to be agitated and not herself. Labs obtained on admission thus far unrevealing (CXR negative, UA neg, digoxin level wnl, CT head neg). VBG w/ no hypercarbia, LA mildly elevated at 2.1, now 1.9 (7/18). Cdiff and enteric panel neg. Covid neg. CRP of 31 on admission and downtrending to 28 on 7/18. Ammonia wnl. Blood cultures pending. On our interview on 7/17, pt is conversant and does not appear altered. It appears that she has returned back to baseline.  -Holding off on antibiotics given lack of evidence of sepsis and lack of clear infectious source  -Neurochecks every 4 hours for the first 48 hours of admission  -Requested a pharmacy consult to review etiologies for acute toxic encephalopathy.              - No changes needed after review of meds.             Recent ischemic left MCA stroke c/b cerebral edema s/p decompressive hemicraniectomy c/b hemorrhagic transformation secondary to therapeutic anticoagulation  Dysarthria   Significant right-sided berhane-paralysis  oropharyngeal dysphagia s/p percutaneous gastrostomy tube on tube feeding  -Will resume aspirin once we ensure there is no active brain bleeding- pt reports not taking, and discharge summaries indicate discontinuation, will attempt verification w/ pharmacy   -Requested dietitian consult to resume tube feeding.  - SPL consulted.              - Initiate pureed (IDDSI 4) diet and thin liquids (7/17) and advanced to IDDSI 5 (7/18) with thin liquids.              - SLP will follow for dysphagia management and trialing advanced solids when pt's dentures are present.  -PTA Atorvastatin.  -The patient will need neurosurgery follow-up in 6 weeks based on documentation from  prior hospital (appears to be scheduled August 2024)     Acute kidney injury, akin stage I on top of CKD stage III, resolved   Cr baseline 1-1.5 (2024) and Cr 1.5 on admission. BUN/Cr ~19, likely prerenal. Now improved on 7/18 with Cr 1.08 and BUN 17.5.  - Strict intake and output and daily body weight  - Resume PTA losartan given stable Cr on 7/19.     Chronic heart failure with mildly reduced ejection fraction LVEF 46%TTE from an outside hospital (6/6/24):  1. Limited echo.  2. Mild LV enlargement with mildly increased wall thickness. Calculated biplane LVEF 46%. Mild global hypokinesis. No LV thrombus.  3. Mild RV enlargement with mildly decreased systolic function. Estimated PASP 25 mmHg + RA Pressure.   -Consider switching metoprolol tartrate to metoprolol succinate prior to discharge for HF   -Consider switching losartan to valsartan once acute kidney injury resolves then eventually initiation of Entresto  - resumed PTA empagliflozin     T2DM, Uncontrolled (7/15/2024 @ 7.4)  Hemoglobin A1c at [facility] was 9.2 on 4/4/2024. Repeat on 7/15 7.4   - resume PTA empagloflozin, lantus 8 units  - Started insulin NovoLog coverage  - Will hold off on metformin in setting of acute kidney injury  - Holding PTA gabapentin 300 mg 3 times daily due to ?sedation on 7/18 AM      Uncontrolled hypertension  - Resume PTA losartan (7/19-xx).  - resume PTA metoprolol as above      COPD not on home oxygen  Breathing comfortably on RA  - resume PTA inhaler (fluticasone vilanterol)  - Bill PRN      Need for placement into a transitional care unit versus long-term care  Daughter Miles on phone and discussed that she is agreeable for placement for patient as she was unable to care for her at home due to her medical needs.   - Case management/social work consulted     Overall stabilized and discharged to TCU on 7/24/2024 for PT, OT, nursing cares, medical management and monitoring.     Following TCU therapies, moved to LTC bed.  "      Today:  She is seen today for regulatory visit. Chart was reviewed, patient seen. She had COVID-19 in September, positive test on 9/20/2024, treated with Molnupiravir, no symptoms of concern remaining. She has right sided weakness from stroke, non ambulatory, residing in LTC, needs assist from staff. Has discomfort on right side, stroke affected. Has tube feeding, bolus TID feedings and allowed oral feeding \"easy to chew\". Diabetic on meds, treated for HTN.       PAST MEDICAL HISTORY:  Past Medical History:   Diagnosis Date     Diabetes mellitus, type II, insulin dependent (H)      HTN (hypertension)      Major depression        MEDICATIONS:  Current Outpatient Medications   Medication Sig Dispense Refill     acetaminophen (TYLENOL) 500 MG tablet Take 1,000 mg by mouth 3 times daily.       albuterol (PROAIR HFA/PROVENTIL HFA/VENTOLIN HFA) 108 (90 Base) MCG/ACT inhaler Inhale 2 puffs into the lungs every 4 hours as needed for shortness of breath, wheezing or cough 18 g 0     amitriptyline (ELAVIL) 10 MG tablet Take 10 mg by mouth at bedtime.       digoxin (LANOXIN) 125 MCG tablet Take 125 mcg by mouth daily.       empagliflozin (JARDIANCE) 10 MG TABS tablet Take 1 tablet (10 mg) by mouth or Feeding Tube daily for 180 days (Patient taking differently: Take 10 mg by mouth or Feeding Tube daily. Complete on 1/18/25) 90 tablet 1     insulin glargine (LANTUS PEN) 100 UNIT/ML pen Inject 8 Units subcutaneously at bedtime for 188 days (Patient taking differently: Inject 15 Units subcutaneously 2 times daily At AM and HS) 15 mL 0     insulin lispro (HUMALOG) 100 UNIT/ML vial Inject 1-3 Units subcutaneously 3 times daily (before meals) Correction Scale - LOW INSULIN RESISTANCE DOSING Do Not give Correction Insulin if Pre-Meal BG less than 140. For Pre-Meal  - 239 give 1 unit. For Pre-Meal  - 339 give 2 units. For Pre-Meal BG greater than or equal to 340 give 3 units. To be given with prandial insulin, and " "based on pre-meal blood glucose. Administering insulin within 5 minutes of the start of the meal is ideal. Administer insulin no more than 30 minutes after the start of the meal, unless directed otherwise by provider. Notify provider if glucose greater than or equal to 350 mg/dL after administration of correction dose. 10 mL 0     insulin lispro (HUMALOG) 100 UNIT/ML vial Inject 1-3 Units subcutaneously at bedtime LOW INSULIN RESISTANCE DOSING Do Not give Bedtime Correction Insulin if BG less than 200. For  - 299 give 1 unit. For  - 399 give 2 units For BG greater than or equal 400 give 3 units. Notify provider if glucose greater than or equal to 350 mg/dL after administration of correction dose 10 mL 0     mometasone-formoterol (DULERA) 100-5 MCG/ACT inhaler Inhale 2 puffs into the lungs 2 times daily.          PHYSICAL EXAM:  General: Patient is alert female, no distress.    Vitals: /75   Pulse 75   Temp 98.3  F (36.8  C)   Resp 16   Ht 1.6 m (5' 3\")   Wt 85.6 kg (188 lb 12.8 oz)   SpO2 95%   BMI 33.44 kg/m    HEENT: Head shows craniectomy scar. Eyes show no injection or icterus. Nares negative. Oropharynx well hydrated.  Neck: No JVD.  Lungs: Non labored respirations.   Abdomen: Soft. GT present.   : Deferred.  Extremities: No edema.  Musculoskeletal: Degen changes. Right hemiparesis.   Skin: Warm and dry.   Psych: Mood appears good.      LABS/DIAGNOSTIC DATA:  Component      Latest Ref Rng 7/19/2024  11:19 AM 7/31/2024  5:30 AM   WBC      4.0 - 11.0 10e3/uL 10.4  11.4 (H)    RBC Count      3.80 - 5.20 10e6/uL 4.13  4.51    Hemoglobin      11.7 - 15.7 g/dL 10.9 (L)  11.6 (L)    Hematocrit      35.0 - 47.0 % 36.7  39.7    MCV      78 - 100 fL 89  88    MCH      26.5 - 33.0 pg 26.4 (L)  25.7 (L)    MCHC      31.5 - 36.5 g/dL 29.7 (L)  29.2 (L)    RDW      10.0 - 15.0 % 16.0 (H)  17.1 (H)    Platelet Count      150 - 450 10e3/uL 340  333       Component      Latest Ref Rng 7/24/2024  4:12 " AM 7/31/2024  5:30 AM   Sodium      135 - 145 mmol/L 138  137    Potassium      3.4 - 5.3 mmol/L 4.5  4.5    Carbon Dioxide (CO2)      22 - 29 mmol/L 25  24    Anion Gap      7 - 15 mmol/L 13  13    Urea Nitrogen      8.0 - 23.0 mg/dL 27.9 (H)  31.4 (H)    Creatinine      0.51 - 0.95 mg/dL 1.11 (H)  1.14 (H)    GFR Estimate      >60 mL/min/1.73m2 54 (L)  53 (L)    Calcium      8.8 - 10.4 mg/dL 9.5  9.7    Chloride      98 - 107 mmol/L 100  100         ASSESSMENT/PLAN:  Stroke. Left MCA distribution. S/p decompressive hemicraniectomy due to cerebral edema, and hemorrhagic transformation. Resultant right hemiplegia, dysphagia, on bolus Tube feeding. Continue statin. Dietician follows for nutritional and hydration needs.   Afib. Not on anticoagulation given brain bleeding. On digoxin for rate control.   HTN. Not on BP meds, continue to monitor.   CHF. Reduced EF. Not on diuretics. Follow up with cardiology.  DM. On empagliflozin, lantus insulin. Accuchecks followed.   Recent COVID-19. Positive test 9/20/2024, treated in facility with Molnupiravir. No current symptoms of concern.   COPD. Respiratory status is stable.   CKD. Labs as above.      Electronically signed by: Shari Lrema MD       Sincerely,        Shari Lerma MD

## 2024-10-11 ENCOUNTER — PATIENT OUTREACH (OUTPATIENT)
Dept: GERIATRIC MEDICINE | Facility: CLINIC | Age: 69
End: 2024-10-11
Payer: COMMERCIAL

## 2024-10-11 NOTE — LETTER
October 11, 2024    Important [employer] Information    SHARIF MUSTAFA  C/O DAVID MUSTAFA  [address] [place] [address] [place] [address]                                                                              Your Care Plan      Dear Sharif,    I am your [employer] Care Coordinator and I visited you at [facility]  on 9/30/2024 to complete your [employer] Health Assessment.    [x] I reviewed your Facility Care Plan and assessment and all identified needs have goals present    [] I reviewed your Facility Care Plan and assessment and we identified these additional goal(s):                                                                                                                                                                                                                                             I will plan to follow up:  [] Once a month  [] Every 3 months   [x] Every 6 months  [] Other      Questions?  If you have any questions or want to discuss your health care needs, call me at [phone] Monday-Friday between 8am and 5pm. TTY: [email or web address].     Sincerely,    CHHAYA Cox, [facility]    E-mail: [email or web address]  Phone: [phone]      [facility]      cc: member record, Skilled Nursing Facility    [email or web address]  is an HMO D-SNP that contracts with both Medicare and the [place] Medical Assistance (Medicaid) program to provide benefits of both programs to enrollees. Enrollment in [employer] depends on contract renewal.  [id number]    2023 [employer]

## 2024-10-11 NOTE — PROGRESS NOTES
[facility] Care Coordination Contact    Received after visit chart from care coordinator.  Completed following tasks: Mailed [employer] Post Visit letter to member/rep and NH facility.    Gaby Issa  Care Management Specialist   [facility]   [phone]

## 2024-10-21 NOTE — PROGRESS NOTES
[facility] GERIATRICS  [place] Medical Record Number:  [id number]  Place of Service where encounter took place: [facility] () [[id number]]  CODE STATUS:   CPR/Full code     Chief Complaint/Reason for Visit:  Chief Complaint   Patient presents with    [facility] Regulatory     LTC 10/10/2024.        HPI:    Roya Burgos is a 69 year old female with hospitalization July 2024 for stroke as noted below.     [facility]  Hospitalist Discharge Summary    Date of Admission:  7/16/2024  Date of Discharge:  7/24/2024     Hospital Course  Roya Burgos is a 69 year old female admitted on 7/16/2024 for evaluation of AMS. She has PMHx of recent ischemic left MCA stroke c/b cerebral edema s/p decompressive hemicraniectomy c/b hemorrhagic transformation 2/2 therapeutic anticoagulation for atrial fibrillation old complicated by dysarthria and significant right-sided berhane-paralysis, oropharyngeal dysphagia s/p percutaneous gastrostomy tube on tube feeding, chronic migraine headache, paroxysmal atrial fibrillation on aspirin only given brain bleeding, chronic heart failure with mildly reduced ejection fraction, hypertension, type 2 diabetes mellitus, hyperlipidemia, chronic kidney disease stage III A, L parotid gland nodule, Bilateral moderate carotid stenosis, and COPD not on home oxygen.  The patient had frequent hospitalization in [facility] and was recently discharged on 7/14/2024 for care at home with home visiting nurse service, but returned as daughter was unable to care for her at home. Hemodynamically and clinically stable.    Migraine headache  Per chart review, patient has been on percocet (10-325mg TID) since 09/2014, but indications are unclear.  - hold PTA amitriptyline, noted to be sedated 7/18 AM > resume on 7/19  - tylenol PRN     Afib  Digoxin levels wnl. Pt w/ afib w/ RVR earlier on 7/17 w/out home meds ordered, otherwise HDS. Will  resume home meds  - PTA metoprolol tartrate 100 mg BID, diltiazem 60 mgs Q8h, digoxin 125mcg Daily   - telemetry  - pt not on AC 2/2 hemorrhagic stroke hx     AMS, resolved  Reported to be agitated and not herself. Labs obtained on admission thus far unrevealing (CXR negative, UA neg, digoxin level wnl, CT head neg). VBG w/ no hypercarbia, LA mildly elevated at 2.1, now 1.9 (7/18). Cdiff and enteric panel neg. Covid neg. CRP of 31 on admission and downtrending to 28 on 7/18. Ammonia wnl. Blood cultures pending. On our interview on 7/17, pt is conversant and does not appear altered. It appears that she has returned back to baseline.  -Holding off on antibiotics given lack of evidence of sepsis and lack of clear infectious source  -Neurochecks every 4 hours for the first 48 hours of admission  -Requested a pharmacy consult to review etiologies for acute toxic encephalopathy.              - No changes needed after review of meds.             Recent ischemic left MCA stroke c/b cerebral edema s/p decompressive hemicraniectomy c/b hemorrhagic transformation secondary to therapeutic anticoagulation  Dysarthria   Significant right-sided berhane-paralysis  oropharyngeal dysphagia s/p percutaneous gastrostomy tube on tube feeding  -Will resume aspirin once we ensure there is no active brain bleeding- pt reports not taking, and discharge summaries indicate discontinuation, will attempt verification w/ pharmacy   -Requested dietitian consult to resume tube feeding.  - SPL consulted.              - Initiate pureed (IDDSI 4) diet and thin liquids (7/17) and advanced to IDDSI 5 (7/18) with thin liquids.              - SLP will follow for dysphagia management and trialing advanced solids when pt's dentures are present.  -PTA Atorvastatin.  -The patient will need neurosurgery follow-up in 6 weeks based on documentation from prior hospital (appears to be scheduled August 2024)     Acute kidney injury, akin stage I on top of CKD stage  III, resolved   Cr baseline 1-1.5 (2024) and Cr 1.5 on admission. BUN/Cr ~19, likely prerenal. Now improved on 7/18 with Cr 1.08 and BUN 17.5.  - Strict intake and output and daily body weight  - Resume PTA losartan given stable Cr on 7/19.     Chronic heart failure with mildly reduced ejection fraction LVEF 46%TTE from an outside hospital (6/6/24):  1. Limited echo.  2. Mild LV enlargement with mildly increased wall thickness. Calculated biplane LVEF 46%. Mild global hypokinesis. No LV thrombus.  3. Mild RV enlargement with mildly decreased systolic function. Estimated PASP 25 mmHg + RA Pressure.   -Consider switching metoprolol tartrate to metoprolol succinate prior to discharge for HF   -Consider switching losartan to valsartan once acute kidney injury resolves then eventually initiation of Entresto  - resumed PTA empagliflozin     T2DM, Uncontrolled (7/15/2024 @ 7.4)  Hemoglobin A1c at [facility] was 9.2 on 4/4/2024. Repeat on 7/15 7.4   - resume PTA empagloflozin, lantus 8 units  - Started insulin NovoLog coverage  - Will hold off on metformin in setting of acute kidney injury  - Holding PTA gabapentin 300 mg 3 times daily due to ?sedation on 7/18 AM      Uncontrolled hypertension  - Resume PTA losartan (7/19-xx).  - resume PTA metoprolol as above      COPD not on home oxygen  Breathing comfortably on RA  - resume PTA inhaler (fluticasone vilanterol)  - Bill PRN      Need for placement into a transitional care unit versus long-term care  Daughter Miles on phone and discussed that she is agreeable for placement for patient as she was unable to care for her at home due to her medical needs.   - Case management/social work consulted     Overall stabilized and discharged to TCU on 7/24/2024 for PT, OT, nursing cares, medical management and monitoring.     Following TCU therapies, moved to LTC bed.       Today:  She is seen today for regulatory visit. Chart was reviewed, patient seen. She had COVID-19 in September,  "positive test on 9/20/2024, treated with Molnupiravir, no symptoms of concern remaining. She has right sided weakness from stroke, non ambulatory, residing in LTC, needs assist from staff. Has discomfort on right side, stroke affected. Has tube feeding, bolus TID feedings and allowed oral feeding \"easy to chew\". Diabetic on meds, treated for HTN.       PAST MEDICAL HISTORY:  Past Medical History:   Diagnosis Date    Diabetes mellitus, type II, insulin dependent (H)     HTN (hypertension)     Major depression        MEDICATIONS:  Current Outpatient Medications   Medication Sig Dispense Refill    acetaminophen (TYLENOL) 500 MG tablet Take 1,000 mg by mouth 3 times daily.      albuterol (PROAIR HFA/PROVENTIL HFA/VENTOLIN HFA) 108 (90 Base) MCG/ACT inhaler Inhale 2 puffs into the lungs every 4 hours as needed for shortness of breath, wheezing or cough 18 g 0    amitriptyline (ELAVIL) 10 MG tablet Take 10 mg by mouth at bedtime.      digoxin (LANOXIN) 125 MCG tablet Take 125 mcg by mouth daily.      empagliflozin (JARDIANCE) 10 MG TABS tablet Take 1 tablet (10 mg) by mouth or Feeding Tube daily for 180 days (Patient taking differently: Take 10 mg by mouth or Feeding Tube daily. Complete on 1/18/25) 90 tablet 1    insulin glargine (LANTUS PEN) 100 UNIT/ML pen Inject 8 Units subcutaneously at bedtime for 188 days (Patient taking differently: Inject 15 Units subcutaneously 2 times daily At AM and HS) 15 mL 0    insulin lispro (HUMALOG) 100 UNIT/ML vial Inject 1-3 Units subcutaneously 3 times daily (before meals) Correction Scale - LOW INSULIN RESISTANCE DOSING Do Not give Correction Insulin if Pre-Meal BG less than 140. For Pre-Meal  - 239 give 1 unit. For Pre-Meal  - 339 give 2 units. For Pre-Meal BG greater than or equal to 340 give 3 units. To be given with prandial insulin, and based on pre-meal blood glucose. Administering insulin within 5 minutes of the start of the meal is ideal. Administer insulin no " "more than 30 minutes after the start of the meal, unless directed otherwise by provider. Notify provider if glucose greater than or equal to 350 mg/dL after administration of correction dose. 10 mL 0    insulin lispro (HUMALOG) 100 UNIT/ML vial Inject 1-3 Units subcutaneously at bedtime LOW INSULIN RESISTANCE DOSING Do Not give Bedtime Correction Insulin if BG less than 200. For  - 299 give 1 unit. For  - 399 give 2 units For BG greater than or equal 400 give 3 units. Notify provider if glucose greater than or equal to 350 mg/dL after administration of correction dose 10 mL 0    mometasone-formoterol (DULERA) 100-5 MCG/ACT inhaler Inhale 2 puffs into the lungs 2 times daily.          PHYSICAL EXAM:  General: Patient is alert female, no distress.    Vitals: /75   Pulse 75   Temp 98.3  F (36.8  C)   Resp 16   Ht 1.6 m (5' 3\")   Wt 85.6 kg (188 lb 12.8 oz)   SpO2 95%   BMI 33.44 kg/m    HEENT: Head shows craniectomy scar. Eyes show no injection or icterus. Nares negative. Oropharynx well hydrated.  Neck: No JVD.  Lungs: Non labored respirations.   Abdomen: Soft. GT present.   : Deferred.  Extremities: No edema.  Musculoskeletal: Degen changes. Right hemiparesis.   Skin: Warm and dry.   Psych: Mood appears good.      LABS/DIAGNOSTIC DATA:  Component      Latest Ref Rng 7/19/2024  11:19 AM 7/31/2024  5:30 AM   WBC      4.0 - 11.0 10e3/uL 10.4  11.4 (H)    RBC Count      3.80 - 5.20 10e6/uL 4.13  4.51    Hemoglobin      11.7 - 15.7 g/dL 10.9 (L)  11.6 (L)    Hematocrit      35.0 - 47.0 % 36.7  39.7    MCV      78 - 100 fL 89  88    MCH      26.5 - 33.0 pg 26.4 (L)  25.7 (L)    MCHC      31.5 - 36.5 g/dL 29.7 (L)  29.2 (L)    RDW      10.0 - 15.0 % 16.0 (H)  17.1 (H)    Platelet Count      150 - 450 10e3/uL 340  333       Component      Latest Ref Rng 7/24/2024  4:12 AM 7/31/2024  5:30 AM   Sodium      135 - 145 mmol/L 138  137    Potassium      3.4 - 5.3 mmol/L 4.5  4.5    Carbon Dioxide (CO2)   "    22 - 29 mmol/L 25  24    Anion Gap      7 - 15 mmol/L 13  13    Urea Nitrogen      8.0 - 23.0 mg/dL 27.9 (H)  31.4 (H)    Creatinine      0.51 - 0.95 mg/dL 1.11 (H)  1.14 (H)    GFR Estimate      >60 mL/min/1.73m2 54 (L)  53 (L)    Calcium      8.8 - 10.4 mg/dL 9.5  9.7    Chloride      98 - 107 mmol/L 100  100         ASSESSMENT/PLAN:  Stroke. Left MCA distribution. S/p decompressive hemicraniectomy due to cerebral edema, and hemorrhagic transformation. Resultant right hemiplegia, dysphagia, on bolus Tube feeding. Continue statin. Dietician follows for nutritional and hydration needs.   Afib. Not on anticoagulation given brain bleeding. On digoxin for rate control.   HTN. Not on BP meds, continue to monitor.   CHF. Reduced EF. Not on diuretics. Follow up with cardiology.  DM. On empagliflozin, lantus insulin. Accuchecks followed.   Recent COVID-19. Positive test 9/20/2024, treated in facility with Molnupiravir. No current symptoms of concern.   COPD. Respiratory status is stable.   CKD. Labs as above.      Electronically signed by: Shari Lerma MD

## 2024-11-20 ENCOUNTER — NURSING HOME VISIT (OUTPATIENT)
Dept: GERIATRICS | Facility: CLINIC | Age: 69
End: 2024-11-20
Payer: COMMERCIAL

## 2024-11-20 VITALS
SYSTOLIC BLOOD PRESSURE: 134 MMHG | WEIGHT: 180.8 LBS | RESPIRATION RATE: 18 BRPM | HEART RATE: 60 BPM | TEMPERATURE: 98.4 F | OXYGEN SATURATION: 97 % | DIASTOLIC BLOOD PRESSURE: 76 MMHG | BODY MASS INDEX: 32.03 KG/M2

## 2024-11-20 DIAGNOSIS — I50.20 HFREF (HEART FAILURE WITH REDUCED EJECTION FRACTION) (H): ICD-10-CM

## 2024-11-20 DIAGNOSIS — I48.0 PAROXYSMAL ATRIAL FIBRILLATION (H): ICD-10-CM

## 2024-11-20 DIAGNOSIS — Z93.1 G TUBE FEEDINGS (H): ICD-10-CM

## 2024-11-20 DIAGNOSIS — F33.9 DEPRESSION, RECURRENT (H): ICD-10-CM

## 2024-11-20 DIAGNOSIS — F51.01 PRIMARY INSOMNIA: ICD-10-CM

## 2024-11-20 DIAGNOSIS — E11.9 TYPE 2 DIABETES MELLITUS TREATED WITH INSULIN (H): ICD-10-CM

## 2024-11-20 DIAGNOSIS — N18.30 TYPE 2 DIABETES MELLITUS WITH STAGE 3 CHRONIC KIDNEY DISEASE, WITH LONG-TERM CURRENT USE OF INSULIN, UNSPECIFIED WHETHER STAGE 3A OR 3B CKD (H): ICD-10-CM

## 2024-11-20 DIAGNOSIS — Z79.4 TYPE 2 DIABETES MELLITUS TREATED WITH INSULIN (H): ICD-10-CM

## 2024-11-20 DIAGNOSIS — I10 ESSENTIAL HYPERTENSION: ICD-10-CM

## 2024-11-20 DIAGNOSIS — I69.359 HISTORY OF HEMORRHAGIC STROKE WITH RESIDUAL HEMIPARESIS (H): Primary | ICD-10-CM

## 2024-11-20 DIAGNOSIS — Z79.4 TYPE 2 DIABETES MELLITUS WITH STAGE 3 CHRONIC KIDNEY DISEASE, WITH LONG-TERM CURRENT USE OF INSULIN, UNSPECIFIED WHETHER STAGE 3A OR 3B CKD (H): ICD-10-CM

## 2024-11-20 DIAGNOSIS — J44.9 CHRONIC OBSTRUCTIVE PULMONARY DISEASE, UNSPECIFIED COPD TYPE (H): ICD-10-CM

## 2024-11-20 DIAGNOSIS — E11.22 TYPE 2 DIABETES MELLITUS WITH STAGE 3 CHRONIC KIDNEY DISEASE, WITH LONG-TERM CURRENT USE OF INSULIN, UNSPECIFIED WHETHER STAGE 3A OR 3B CKD (H): ICD-10-CM

## 2024-11-20 DIAGNOSIS — Z00.00 ENCOUNTER FOR SCREENING AND PREVENTATIVE CARE: ICD-10-CM

## 2024-11-20 DIAGNOSIS — D64.9 NORMOCYTIC ANEMIA: ICD-10-CM

## 2024-11-20 RX ORDER — MIRTAZAPINE 30 MG/1
30 TABLET, FILM COATED ORAL AT BEDTIME
COMMUNITY

## 2024-11-20 RX ORDER — ATORVASTATIN CALCIUM 40 MG/1
40 TABLET, FILM COATED ORAL DAILY
COMMUNITY

## 2024-11-20 ASSESSMENT — PATIENT HEALTH QUESTIONNAIRE - PHQ9: SUM OF ALL RESPONSES TO PHQ QUESTIONS 1-9: 21

## 2024-11-20 NOTE — LETTER
" 11/20/2024      Roya Burgos  C/o Miles Burgos  [address] [facility] [address]  [facility] [address]        [facility] GERIATRICS  Chief Complaint   Patient presents with     Wellness Visit     [place] Medical Record Number:  [id number]  Place of Service where encounter took place:  [facility] () [[id number]]    SUBJECTIVE:   Roya is a 69 year old who presents for Preventive Visit.  Are you in the first 12 months of Medicare coverage? No    HPI  Roya has a history of ischemic left MCA stroke with cerebral edema status post decompressive hemicraniotomy and complications with significant right sided hemiparalysis and dysphagia.  She does have a PEG tube in place.  Chronic headache now in A-fib on aspirin only.  Also CKD, hyperlipidemia, type 2 diabetes and COPD not on home oxygen. She has a history of 10 years of smoking half a pack a day. Currently not smoking.  She resides in LTC as her daughter was unable to care for her at home.     Today states that she does not have any concerns. Since her stroke and moving into long term care she has been quite depressed. Denies SI/HI today. She is declining many of the offered screenings today. When educated about lab testing and risks and benefits associated with preventative care cares she states, \"I am not interested.\" She does have an upcoming appt for a colonoscopy per nursing report. We did discuss her current insulin regimen and blood sugars. I discussed that I would like to decrease her insulin due to having blood sugars in the 80s-100s. She is in agreement with this plan. In regards to depression, she is being managed by [facility] who recently made changes to her regimen.     Have you ever done Advance Care Planning?  Yes advance care planning is on file    Fall risk  No falls in the last two years    Cognitive Screening   Abnormal. Recalls NO objects. Probable Cognitive impairment. She is still her own decision maker at this time.     Do you have " sleep apnea, excessive snoring or daytime drowsiness? : no    Reviewed and updated as needed this visit by clinical staff  Reviewed and updated as needed this visit by Provider     Social History     Tobacco Use     Smoking status: Former     Current packs/day: 0.00     Types: Cigarettes     Smokeless tobacco: Not on file   Substance Use Topics     Alcohol use: No     Do you have a current opioid prescription? No  Do you use any other controlled substances or medications that are not prescribed by a provider? None    Patient Care Team:  Yesenia Blount CNP as PCP - General (Nurse Practitioner)  [facility]  Shari Lerma MD as Physician (Family Medicine)  Car Escobar as Geriatric Services   Angeles Villasenor LSW as Lead Care Coordinator (Primary Care - CC)  Deya Mcginnis as Case Management Specialist  Shari Lerma MD as Assigned PCP    The following health maintenance items are reviewed in Epic and correct as of today:    Health Maintenance   Topic Date Due     DEXA  Never done- refusing today     HF ACTION PLAN  Never done     LIPID  5/2025     MICROALBUMIN  Due 2021     TSH W/FREE T4 REFLEX  Never done- TSH normal May 2024     DIABETIC FOOT EXAM  Never done     SPIROMETRY  Never done     COPD ACTION PLAN  Never done     DEPRESSION ACTION PLAN  Never done     COLORECTAL CANCER SCREENING  Never done- colonoscopy scheduled for Jan 2025     HEPATITIS C SCREENING  Never done- refusing today     ZOSTER IMMUNIZATION (1 of 2) Never done     Pneumococcal Vaccine: 65+ Years (2 of 2 - PCV) 07/28/2010     DTAP/TDAP/TD IMMUNIZATION (1 - Tdap) 03/20/2012     RSV VACCINE (1 - Risk 60-74 years 1-dose series) Never done     EYE EXAM  12/21/2022     MAMMO SCREENING  04/09/2023     INFLUENZA VACCINE (1) 09/01/2024     COVID-19 Vaccine (3 - 2024-25 season) 09/01/2024     MEDICARE ANNUAL WELLNESS VISIT  09/22/2024     A1C  01/16/2025     BMP  01/24/2025     ALT  07/18/2025  "    DIGOXIN  07/24/2025     CBC  07/31/2025     HEMOGLOBIN  07/31/2025     FALL RISK ASSESSMENT  11/20/2025     PHQ-9  11/20/2025     ADVANCE CARE PLANNING  11/20/2029     URINALYSIS  Completed     HPV IMMUNIZATION  Aged Out     MENINGITIS IMMUNIZATION  Aged Out     RSV MONOCLONAL ANTIBODY  Aged Out     Labs reviewed in [facility]  Mammogram Screening: Mammogram Screening - Mammography discussed and declined    Mammogram Screening - Mammography discussed and declined  Pertinent mammograms are reviewed under the imaging tab.    Colon cancer screening- colonoscopy scheduled for Jan 2025.    Review of Systems  Constitutional, HEENT, cardiovascular, pulmonary, gi and gu systems are negative, except as otherwise noted.    OBJECTIVE:   /76   Pulse 60   Temp 98.4  F (36.9  C)   Resp 18   Wt 82 kg (180 lb 12.8 oz)   SpO2 97%   BMI 32.03 kg/m   Estimated body mass index is 32.03 kg/m  as calculated from the following:    Height as of 10/10/24: 1.6 m (5' 3\").    Weight as of this encounter: 82 kg (180 lb 12.8 oz).  Physical exam  GENERAL APPEARANCE:  alert and no distress  HENT: Hemicraniectomy.   RESP: Diminished breath sounds  CV: irregular rhythm, controlled rate, known atrial fibrillation. no peripheral edema and peripheral pulses strong  ABDOMEN: soft, nontender, no hepatosplenomegaly, no masses and bowel sounds normal. PEG patent  NEURO: Dense right sided hemiparesis  PSYCH: insight and judgement impaired, memory impaired    Diagnostic Test Results:  Labs reviewed in Epic  Lab Results   Component Value Date    WBC 11.4 07/31/2024     Lab Results   Component Value Date    RBC 4.51 07/31/2024     Lab Results   Component Value Date    HGB 11.6 07/31/2024     Lab Results   Component Value Date    HCT 39.7 07/31/2024     Lab Results   Component Value Date    MCV 88 07/31/2024     Lab Results   Component Value Date    MCH 25.7 07/31/2024     Lab Results   Component Value Date    MCHC 29.2 07/31/2024     Lab Results "   Component Value Date    RDW 17.1 07/31/2024     Lab Results   Component Value Date     07/31/2024    Last Comprehensive Metabolic Panel:  Lab Results   Component Value Date     07/31/2024    POTASSIUM 4.5 07/31/2024    CHLORIDE 100 07/31/2024    CO2 24 07/31/2024    ANIONGAP 13 07/31/2024     (H) 07/31/2024    BUN 31.4 (H) 07/31/2024    CR 1.14 (H) 07/31/2024    GFRESTIMATED 53 (L) 07/31/2024    AMAURY 9.7 07/31/2024       Lab Results   Component Value Date    DIGOXIN 1.0 07/24/2024      Last Comprehensive Metabolic Panel:  Sodium   Date Value Ref Range Status   07/31/2024 137 135 - 145 mmol/L Final     Potassium   Date Value Ref Range Status   07/31/2024 4.5 3.4 - 5.3 mmol/L Final     Chloride   Date Value Ref Range Status   07/31/2024 100 98 - 107 mmol/L Final     Carbon Dioxide (CO2)   Date Value Ref Range Status   07/31/2024 24 22 - 29 mmol/L Final     Anion Gap   Date Value Ref Range Status   07/31/2024 13 7 - 15 mmol/L Final     Glucose   Date Value Ref Range Status   07/31/2024 162 (H) 70 - 99 mg/dL Final     GLUCOSE BY METER POCT   Date Value Ref Range Status   07/24/2024 268 (H) 70 - 99 mg/dL Final     Urea Nitrogen   Date Value Ref Range Status   07/31/2024 31.4 (H) 8.0 - 23.0 mg/dL Final     Creatinine   Date Value Ref Range Status   07/31/2024 1.14 (H) 0.51 - 0.95 mg/dL Final     GFR Estimate   Date Value Ref Range Status   07/31/2024 53 (L) >60 mL/min/1.73m2 Final     Calcium   Date Value Ref Range Status   07/31/2024 9.7 8.8 - 10.4 mg/dL Final     Comment:     Reference intervals for this test were updated on 7/16/2024 to reflect our healthy population more accurately. There may be differences in the flagging of prior results with similar values performed with this method. Those prior results can be interpreted in the context of the updated reference intervals.     Bilirubin Total   Date Value Ref Range Status   07/18/2024 0.4 <=1.2 mg/dL Final     Alkaline Phosphatase   Date Value  Ref Range Status   07/18/2024 92 40 - 150 U/L Final     ALT   Date Value Ref Range Status   07/18/2024 10 0 - 50 U/L Final     AST   Date Value Ref Range Status   07/18/2024 26 0 - 45 U/L Final     Lab Results   Component Value Date    A1C 7.4 07/16/2024            ASSESSMENT / PLAN:   History of hemorrhagic stroke with residual hemiparesis (H)  Stroke. Left MCA distribution. S/p decompressive hemicraniectomy due to cerebral edema, and hemorrhagic transformation.  Right sided hemiplegia. Continue statin.   11/29/24 CT scan  12/3/24 Follow up with Dr. [facility]    G tube feeding  Dietician follows for nutritional and hydration needs.   Adult Formula bolus feeding TID. Weights 178lbs- 188 lbs    Afib  Not on anticoagulation given brain bleeding. On digoxin for rate control (HR 70-80). 7/24/24 Dig level 1.0.     HTN  Well controlled not on BP meds. Bps 100s- 130s/ 70s.     CHF with reduced ejection fraction (H)  Reduced EF (ejection fraction LVEF 46%TTE from an outside hospital (6/6/24).  Not on diuretics. Continue jardiance 10 mg. Recommend follow up with cardiology. Weight stable. Appears euvolemic on exam.     Type II Diabetes with current long term use insulin  Blood sugars borderline low (80-100s). Decrease insulin glargine to 14 units BID. Continue Jardiance 10 mg daily. Last A1c 7/16/24 7.4%.   Obtain CBC/BMP/Hgb A1c next lab day    COPD  Stable. Continue ipratropium albuterol nebulizer, Dulera, albuterol PRN.  Room air    CKD stage 3a  Last 8/23/24 Creat 1.33; eGFR 44. Avoid nephrotoxins  Obtain BMP next lab day    Normocytic anemia  Waxes and wanes. Hbg 10-12  Obtain CBC, iron sats, TIBC, serum iron and ferritin    Depression, recurrent  Insomnia  Managed by ACP. Continue amitriptyline, mirtazapine and melatonin.  PhQ9 today: 21  Denies HI/SI    Encounter for screening and preventative care  Colonoscopy scheduled for Jan 2025  Offered mammogram, Declined   Will obtain Hep C    COUNSELING:  Reviewed  preventive health counseling, as reflected in patient instructions       Fall risk prevention       Osteoporosis prevention/bone health       Consider lung cancer screening for ages 55-80 years (77 for Medicare) and 20 pack-year smoking history         Colon cancer screening       Hepatitis C screening       Advanced Planning     BMI  Estimated BMI is 32.03 as calculated from the following:  Height 10/10/24 1.6 m  Weight as of this encounter 82 kg  Weight management plan: followed by dietician for tube feedings    Appropriate preventive services were discussed with this patient, including applicable screening as appropriate for cardiovascular disease, diabetes, osteopenia/osteoporosis, and glaucoma.  As appropriate for age/gender, discussed screening for colorectal cancer, prostate cancer, breast cancer, and cervical cancer. Checklist reviewing preventive services available has been given to the patient.    Reviewed patients plan of care and provided an AVS. The Basic Care Plan (routine screening as documented in Health Maintenance) for Roya meets the Care Plan requirement. This Care Plan has been established and reviewed with the caregiver ([facility] clinical manager)    CINDY Hodges CNP  [facility] GERIATRICS    Identified Health Risks:  I have reviewed Opioid Use Disorder and Substance Use Disorder risk factors and made any needed referrals.     >45 minutes were spent assessing patient, providing education, reviewing historical records, collaborating with nursing staff and developing plan of care.     Electronically signed by:  CINDY Hodges CNP       Sincerely,        CINDY Hodges CNP

## 2024-11-20 NOTE — PROGRESS NOTES
"[facility] GERIATRICS  Chief Complaint   Patient presents with    Wellness Visit     [place] Medical Record Number:  [id number]  Place of Service where encounter took place:  [facility] () [[id number]]    SUBJECTIVE:   Roya is a 69 year old who presents for Preventive Visit.  Are you in the first 12 months of Medicare coverage? No    HPI  Roya has a history of ischemic left MCA stroke with cerebral edema status post decompressive hemicraniotomy and complications with significant right sided hemiparalysis and dysphagia.  She does have a PEG tube in place.  Chronic headache now in A-fib on aspirin only.  Also CKD, hyperlipidemia, type 2 diabetes and COPD not on home oxygen. She has a history of 10 years of smoking half a pack a day. Currently not smoking.  She resides in LTC as her daughter was unable to care for her at home.     Today states that she does not have any concerns. Since her stroke and moving into long term care she has been quite depressed. Denies SI/HI today. She is declining many of the offered screenings today. When educated about lab testing and risks and benefits associated with preventative care cares she states, \"I am not interested.\" She does have an upcoming appt for a colonoscopy per nursing report. We did discuss her current insulin regimen and blood sugars. I discussed that I would like to decrease her insulin due to having blood sugars in the 80s-100s. She is in agreement with this plan. In regards to depression, she is being managed by [facility] who recently made changes to her regimen.     Have you ever done Advance Care Planning?  Yes advance care planning is on file    Fall risk  No falls in the last two years    Cognitive Screening   Abnormal. Recalls NO objects. Probable Cognitive impairment. She is still her own decision maker at this time.     Do you have sleep apnea, excessive snoring or daytime drowsiness? : no    Reviewed and updated as needed this visit by " clinical staff  Reviewed and updated as needed this visit by Provider     Social History     Tobacco Use    Smoking status: Former     Current packs/day: 0.00     Types: Cigarettes    Smokeless tobacco: Not on file   Substance Use Topics    Alcohol use: No     Do you have a current opioid prescription? No  Do you use any other controlled substances or medications that are not prescribed by a provider? None    Patient Care Team:  Yesenia Blount CNP as PCP - General (Nurse Practitioner)  [facility]  Shari Lerma MD as Physician (Family Medicine)  Car Escobar as Geriatric Services   Angeles Villasenor LSW as Lead Care Coordinator (Primary Care - CC)  Deya Mcginnis as Case Management Specialist  Shari Lerma MD as Assigned PCP    The following health maintenance items are reviewed in Epic and correct as of today:    Health Maintenance   Topic Date Due    DEXA  Never done- refusing today    HF ACTION PLAN  Never done    LIPID  5/2025    MICROALBUMIN  Due 2021    TSH W/FREE T4 REFLEX  Never done- TSH normal May 2024    DIABETIC FOOT EXAM  Never done    SPIROMETRY  Never done    COPD ACTION PLAN  Never done    DEPRESSION ACTION PLAN  Never done    COLORECTAL CANCER SCREENING  Never done- colonoscopy scheduled for Jan 2025    HEPATITIS C SCREENING  Never done- refusing today    ZOSTER IMMUNIZATION (1 of 2) Never done    Pneumococcal Vaccine: 65+ Years (2 of 2 - PCV) 07/28/2010    DTAP/TDAP/TD IMMUNIZATION (1 - Tdap) 03/20/2012    RSV VACCINE (1 - Risk 60-74 years 1-dose series) Never done    EYE EXAM  12/21/2022    MAMMO SCREENING  04/09/2023    INFLUENZA VACCINE (1) 09/01/2024    COVID-19 Vaccine (3 - 2024-25 season) 09/01/2024    MEDICARE ANNUAL WELLNESS VISIT  09/22/2024    A1C  01/16/2025    BMP  01/24/2025    ALT  07/18/2025    DIGOXIN  07/24/2025    CBC  07/31/2025    HEMOGLOBIN  07/31/2025    FALL RISK ASSESSMENT  11/20/2025    PHQ-9  11/20/2025     "ADVANCE CARE PLANNING  11/20/2029    URINALYSIS  Completed    HPV IMMUNIZATION  Aged Out    MENINGITIS IMMUNIZATION  Aged Out    RSV MONOCLONAL ANTIBODY  Aged Out     Labs reviewed in [facility]  Mammogram Screening: Mammogram Screening - Mammography discussed and declined    Mammogram Screening - Mammography discussed and declined  Pertinent mammograms are reviewed under the imaging tab.    Colon cancer screening- colonoscopy scheduled for Jan 2025.    Review of Systems  Constitutional, HEENT, cardiovascular, pulmonary, gi and gu systems are negative, except as otherwise noted.    OBJECTIVE:   /76   Pulse 60   Temp 98.4  F (36.9  C)   Resp 18   Wt 82 kg (180 lb 12.8 oz)   SpO2 97%   BMI 32.03 kg/m   Estimated body mass index is 32.03 kg/m  as calculated from the following:    Height as of 10/10/24: 1.6 m (5' 3\").    Weight as of this encounter: 82 kg (180 lb 12.8 oz).  Physical exam  GENERAL APPEARANCE:  alert and no distress  HENT: Hemicraniectomy.   RESP: Diminished breath sounds  CV: irregular rhythm, controlled rate, known atrial fibrillation. no peripheral edema and peripheral pulses strong  ABDOMEN: soft, nontender, no hepatosplenomegaly, no masses and bowel sounds normal. PEG patent  NEURO: Dense right sided hemiparesis  PSYCH: insight and judgement impaired, memory impaired    Diagnostic Test Results:  Labs reviewed in Epic  Lab Results   Component Value Date    WBC 11.4 07/31/2024     Lab Results   Component Value Date    RBC 4.51 07/31/2024     Lab Results   Component Value Date    HGB 11.6 07/31/2024     Lab Results   Component Value Date    HCT 39.7 07/31/2024     Lab Results   Component Value Date    MCV 88 07/31/2024     Lab Results   Component Value Date    MCH 25.7 07/31/2024     Lab Results   Component Value Date    MCHC 29.2 07/31/2024     Lab Results   Component Value Date    RDW 17.1 07/31/2024     Lab Results   Component Value Date     07/31/2024    Last Comprehensive Metabolic " Panel:  Lab Results   Component Value Date     07/31/2024    POTASSIUM 4.5 07/31/2024    CHLORIDE 100 07/31/2024    CO2 24 07/31/2024    ANIONGAP 13 07/31/2024     (H) 07/31/2024    BUN 31.4 (H) 07/31/2024    CR 1.14 (H) 07/31/2024    GFRESTIMATED 53 (L) 07/31/2024    AMAURY 9.7 07/31/2024       Lab Results   Component Value Date    DIGOXIN 1.0 07/24/2024      Last Comprehensive Metabolic Panel:  Sodium   Date Value Ref Range Status   07/31/2024 137 135 - 145 mmol/L Final     Potassium   Date Value Ref Range Status   07/31/2024 4.5 3.4 - 5.3 mmol/L Final     Chloride   Date Value Ref Range Status   07/31/2024 100 98 - 107 mmol/L Final     Carbon Dioxide (CO2)   Date Value Ref Range Status   07/31/2024 24 22 - 29 mmol/L Final     Anion Gap   Date Value Ref Range Status   07/31/2024 13 7 - 15 mmol/L Final     Glucose   Date Value Ref Range Status   07/31/2024 162 (H) 70 - 99 mg/dL Final     GLUCOSE BY METER POCT   Date Value Ref Range Status   07/24/2024 268 (H) 70 - 99 mg/dL Final     Urea Nitrogen   Date Value Ref Range Status   07/31/2024 31.4 (H) 8.0 - 23.0 mg/dL Final     Creatinine   Date Value Ref Range Status   07/31/2024 1.14 (H) 0.51 - 0.95 mg/dL Final     GFR Estimate   Date Value Ref Range Status   07/31/2024 53 (L) >60 mL/min/1.73m2 Final     Calcium   Date Value Ref Range Status   07/31/2024 9.7 8.8 - 10.4 mg/dL Final     Comment:     Reference intervals for this test were updated on 7/16/2024 to reflect our healthy population more accurately. There may be differences in the flagging of prior results with similar values performed with this method. Those prior results can be interpreted in the context of the updated reference intervals.     Bilirubin Total   Date Value Ref Range Status   07/18/2024 0.4 <=1.2 mg/dL Final     Alkaline Phosphatase   Date Value Ref Range Status   07/18/2024 92 40 - 150 U/L Final     ALT   Date Value Ref Range Status   07/18/2024 10 0 - 50 U/L Final     AST   Date  Value Ref Range Status   07/18/2024 26 0 - 45 U/L Final     Lab Results   Component Value Date    A1C 7.4 07/16/2024            ASSESSMENT / PLAN:   History of hemorrhagic stroke with residual hemiparesis (H)  Stroke. Left MCA distribution. S/p decompressive hemicraniectomy due to cerebral edema, and hemorrhagic transformation.  Right sided hemiplegia. Continue statin.   11/29/24 CT scan  12/3/24 Follow up with Dr. [facility]    G tube feeding  Dietician follows for nutritional and hydration needs.   Adult Formula bolus feeding TID. Weights 178lbs- 188 lbs    Afib  Not on anticoagulation given brain bleeding. On digoxin for rate control (HR 70-80). 7/24/24 Dig level 1.0.     HTN  Well controlled not on BP meds. Bps 100s- 130s/ 70s.     CHF with reduced ejection fraction (H)  Reduced EF (ejection fraction LVEF 46%TTE from an outside hospital (6/6/24).  Not on diuretics. Continue jardiance 10 mg. Recommend follow up with cardiology. Weight stable. Appears euvolemic on exam.     Type II Diabetes with current long term use insulin  Blood sugars borderline low (80-100s). Decrease insulin glargine to 14 units BID. Continue Jardiance 10 mg daily. Last A1c 7/16/24 7.4%.   Obtain CBC/BMP/Hgb A1c next lab day    COPD  Stable. Continue ipratropium albuterol nebulizer, Dulera, albuterol PRN.  Room air    CKD stage 3a  Last 8/23/24 Creat 1.33; eGFR 44. Avoid nephrotoxins  Obtain BMP next lab day    Normocytic anemia  Waxes and wanes. Hbg 10-12  Obtain CBC, iron sats, TIBC, serum iron and ferritin    Depression, recurrent  Insomnia  Managed by ACP. Continue amitriptyline, mirtazapine and melatonin.  PhQ9 today: 21  Denies HI/SI    Encounter for screening and preventative care  Colonoscopy scheduled for Jan 2025  Offered mammogram, Declined   Will obtain Hep C    COUNSELING:  Reviewed preventive health counseling, as reflected in patient instructions       Fall risk prevention       Osteoporosis prevention/bone health        Consider lung cancer screening for ages 55-80 years (77 for Medicare) and 20 pack-year smoking history         Colon cancer screening       Hepatitis C screening       Advanced Planning     BMI  Estimated BMI is 32.03 as calculated from the following:  Height 10/10/24 1.6 m  Weight as of this encounter 82 kg  Weight management plan: followed by dietician for tube feedings    Appropriate preventive services were discussed with this patient, including applicable screening as appropriate for cardiovascular disease, diabetes, osteopenia/osteoporosis, and glaucoma.  As appropriate for age/gender, discussed screening for colorectal cancer, prostate cancer, breast cancer, and cervical cancer. Checklist reviewing preventive services available has been given to the patient.    Reviewed patients plan of care and provided an AVS. The Basic Care Plan (routine screening as documented in Health Maintenance) for Roya meets the Care Plan requirement. This Care Plan has been established and reviewed with the caregiver ([facility] clinical manager)    CINDY Hodges CNP  [facility] GERIATRICS    Identified Health Risks:  I have reviewed Opioid Use Disorder and Substance Use Disorder risk factors and made any needed referrals.     >45 minutes were spent assessing patient, providing education, reviewing historical records, collaborating with nursing staff and developing plan of care.     Electronically signed by:  CINDY Hodges CNP

## 2024-12-04 ENCOUNTER — NURSING HOME VISIT (OUTPATIENT)
Dept: GERIATRICS | Facility: CLINIC | Age: 69
End: 2024-12-04
Payer: COMMERCIAL

## 2024-12-04 VITALS
RESPIRATION RATE: 16 BRPM | TEMPERATURE: 98 F | OXYGEN SATURATION: 98 % | DIASTOLIC BLOOD PRESSURE: 76 MMHG | HEART RATE: 73 BPM | HEIGHT: 63 IN | WEIGHT: 174.2 LBS | BODY MASS INDEX: 30.87 KG/M2 | SYSTOLIC BLOOD PRESSURE: 125 MMHG

## 2024-12-04 DIAGNOSIS — D64.9 NORMOCYTIC ANEMIA: ICD-10-CM

## 2024-12-04 DIAGNOSIS — Z79.4 TYPE 2 DIABETES MELLITUS TREATED WITH INSULIN (H): ICD-10-CM

## 2024-12-04 DIAGNOSIS — I50.20 HFREF (HEART FAILURE WITH REDUCED EJECTION FRACTION) (H): ICD-10-CM

## 2024-12-04 DIAGNOSIS — J44.9 CHRONIC OBSTRUCTIVE PULMONARY DISEASE, UNSPECIFIED COPD TYPE (H): ICD-10-CM

## 2024-12-04 DIAGNOSIS — E11.9 TYPE 2 DIABETES MELLITUS TREATED WITH INSULIN (H): ICD-10-CM

## 2024-12-04 DIAGNOSIS — F51.01 PRIMARY INSOMNIA: ICD-10-CM

## 2024-12-04 DIAGNOSIS — N18.31 STAGE 3A CHRONIC KIDNEY DISEASE (H): ICD-10-CM

## 2024-12-04 DIAGNOSIS — Z86.73 HISTORY OF CVA (CEREBROVASCULAR ACCIDENT): Primary | ICD-10-CM

## 2024-12-04 DIAGNOSIS — I10 ESSENTIAL HYPERTENSION: ICD-10-CM

## 2024-12-04 DIAGNOSIS — I48.0 PAROXYSMAL ATRIAL FIBRILLATION (H): ICD-10-CM

## 2024-12-04 DIAGNOSIS — F33.9 DEPRESSION, RECURRENT (H): ICD-10-CM

## 2024-12-04 DIAGNOSIS — Z93.1 G TUBE FEEDINGS (H): ICD-10-CM

## 2024-12-04 NOTE — PROGRESS NOTES
"[facility] GERIATRICS    Chief Complaint   Patient presents with    RECHECK     HPI:  Roya Burgos is a 69 year old  (1955), who is being seen today for an episodic care visit at: [facility] () [[id number]]. Today's concern is: regulatory visit/recheck    Roya has a history of ischemic left MCA stroke with cerebral edema status post decompressive hemicraniotomy and complications with significant right sided hemiparalysis and dysphagia.  She does have a PEG tube in place.  Chronic headache now in A-fib on aspirin only.  Also CKD, hyperlipidemia, type 2 diabetes and COPD not on home oxygen. She has a history of 10 years of smoking half a pack a day. Currently not smoking.  She resides in [place] as her daughter was unable to care for her at home.      Today she reports doing well. She states she always has a headache but it has been feeling \"a little bit more\" lately and she is wondering if she can have an increase in tylenol. I discussed with her that her blood sugars remain on the lower end and therefore I would like to further decrease her long acting insulin. She is in agreement with this plan. She missed her recent appt with Neurosurgery as well as her CT. I discussed with the nurse manager and this has been rescheduled to next week. She would like to see [facility] again as they are managing her mental health. I have relayed this to the nurse manager. She continues to have right sided pain in her arm following her stroke which is mangShe denies chest pain, vision changes, shortness of breath, issues with bowel or bladder. I discussed with her that I ordered labs when I saw her last month, however it appears they were not done so I will reorder them today. She is okay with this plan.     Allergies, and PMH/PSH reviewed in EPIC today.  REVIEW OF SYSTEMS:  Patient denies fever, chills, lightheadedness, dizziness, rhinorrhea, cough, congestion, shortness of breath, chest pain, palpitations, " "abdominal pain, n/v, diarrhea, constipation, change in appetite, change in sleep pattern, dysuria, frequency, burning or pain with urination.  Other than stated in HPI all other review of systems is negative.      Objective:   Vital signs:/76   Pulse 73   Temp 98  F (36.7  C)   Resp 16   Ht 1.6 m (5' 3\")   Wt 79 kg (174 lb 3.2 oz)   SpO2 98%   BMI 30.86 kg/m     GENERAL APPEARANCE: Well developed, well nourished, in no acute distress.  LUNGS: Lung sounds CTA, no adventitious sounds, respiratory effort normal.  CARD: RRR, S1, S2, without murmurs, gallops, rubs, no JVD, peripheral pulses 2+ and symmetric  ABD: Soft, nondistended and nontender with normal bowel sounds.   MSK: Right sided weakness (baseline post stroke)  EXTREMITIES: No cyanosis, clubbing or edema.  NEURO: Alert and oriented x 3. Normal affect. Sensation to touch was normal. Face is symmetric.  SKIN: Inspection of the skin reveals no rashes, ulcerations or petechiae.  PSYCH: euthymic       Lab Results   Component Value Date    WBC 11.4 07/31/2024     Lab Results   Component Value Date    RBC 4.51 07/31/2024     Lab Results   Component Value Date    HGB 11.6 07/31/2024     Lab Results   Component Value Date    HCT 39.7 07/31/2024     Lab Results   Component Value Date    MCV 88 07/31/2024     Lab Results   Component Value Date    MCH 25.7 07/31/2024     Lab Results   Component Value Date    MCHC 29.2 07/31/2024     Lab Results   Component Value Date    RDW 17.1 07/31/2024     Lab Results   Component Value Date     07/31/2024    Last Comprehensive Metabolic Panel:  Lab Results   Component Value Date     07/31/2024    POTASSIUM 4.5 07/31/2024    CHLORIDE 100 07/31/2024    CO2 24 07/31/2024    ANIONGAP 13 07/31/2024     (H) 07/31/2024    BUN 31.4 (H) 07/31/2024    CR 1.14 (H) 07/31/2024    GFRESTIMATED 53 (L) 07/31/2024    AMAURY 9.7 07/31/2024       Assessment/Plan:  History of CVA  Left MCA distribution. S/p decompressive " hemicraniectomy due to cerebral edema, and hemorrhagic transformation.  Right sided hemiplegia. Continue statin.   CT scan rescheduled for 12/9  Follow up with Dr. Davis Neurosurgery rescheduled for 12/10     G tube feeding  No changes today. Dietician follows for nutritional and hydration needs.   Adult Formula bolus feeding TID. Weights 178lbs- 188 lbs. Continue to trend weights     Afib  Stable. Not on anticoagulation given brain bleeding. On digoxin for rate control (HR 70-80). 7/24/24 Dig level 1.0.      HTN  No changes today. Well controlled not on BP meds. Bps 100s- 130s/ 70s.      Headache   Chronic without alarm features. Increase in tylenol to 650 mg (2 tablets) q8h    CHF with reduced ejection fraction (H)  No changes today. Reduced EF (ejection fraction LVEF 46%TTE from an outside hospital (6/6/24).  Not on diuretics. Continue jardiance 10 mg. Recommend follow up with cardiology. Weight stable. Appears euvolemic on exam.      Type II Diabetes with current long term use insulin  Blood sugars continue to be low 70s-180s. Decrease insulin glargine further to 13 units BID. Continue Jardiance 10 mg daily. Last A1c 7/16/24 7.4%.   Obtain CBC/BMP/Hgb A1c next lab day     COPD  No respiratory concerns today. Continue ipratropium albuterol nebulizer, Dulera, albuterol PRN.  Room air     CKD stage 3a  No changes today. Last 8/23/24 Creat 1.33; eGFR 44. Avoid nephrotoxins  Obtain BMP next lab day     Normocytic anemia  Stable.  Hbg 10-12  Obtain CBC, iron sats, TIBC, serum iron and ferritin     Depression, recurrent  Insomnia  Requested nursing arrange a visit with [facility] per request of patient.   Continue amitriptyline, mirtazapine and melatonin.  PhQ9 today: 21  Denies HI/SI  Reports to be sleeping okay     Orders:  Labs as above ordered for next lab day  Follow up and CT with Neurosurgery as above  Decrease glargine to 13 units BID  Increase in Tylenol as seen above    Electronically signed by: Anny KISER  CINDY Camargo CNP on 12/4/2024 at 8:37 PM

## 2024-12-04 NOTE — LETTER
" 12/4/2024      Roya Burgos  C/o Miles Aubrey  [address]  [place] [address]        [facility] GERIATRICS    Chief Complaint   Patient presents with     RECHECK     HPI:  Roya Burgos is a 69 year old  (1955), who is being seen today for an episodic care visit at: [facility] () [[id number]]. Today's concern is: regulatory visit/recheck    Roya has a history of ischemic left MCA stroke with cerebral edema status post decompressive hemicraniotomy and complications with significant right sided hemiparalysis and dysphagia.  She does have a PEG tube in place.  Chronic headache now in A-fib on aspirin only.  Also CKD, hyperlipidemia, type 2 diabetes and COPD not on home oxygen. She has a history of 10 years of smoking half a pack a day. Currently not smoking.  She resides in LT as her daughter was unable to care for her at home.      Today she reports doing well. She states she always has a headache but it has been feeling \"a little bit more\" lately and she is wondering if she can have an increase in tylenol. I discussed with her that her blood sugars remain on the lower end and therefore I would like to further decrease her long acting insulin. She is in agreement with this plan. She missed her recent appt with Neurosurgery as well as her CT. I discussed with the nurse manager and this has been rescheduled to next week. She would like to see [facility] again as they are managing her mental health. I have relayed this to the nurse manager. She continues to have right sided pain in her arm following her stroke which is mangShe denies chest pain, vision changes, shortness of breath, issues with bowel or bladder. I discussed with her that I ordered labs when I saw her last month, however it appears they were not done so I will reorder them today. She is okay with this plan.     Allergies, and PMH/PSH reviewed in EPIC today.  REVIEW OF SYSTEMS:  Patient denies fever, chills, " "lightheadedness, dizziness, rhinorrhea, cough, congestion, shortness of breath, chest pain, palpitations, abdominal pain, n/v, diarrhea, constipation, change in appetite, change in sleep pattern, dysuria, frequency, burning or pain with urination.  Other than stated in HPI all other review of systems is negative.      Objective:   Vital signs:/76   Pulse 73   Temp 98  F (36.7  C)   Resp 16   Ht 1.6 m (5' 3\")   Wt 79 kg (174 lb 3.2 oz)   SpO2 98%   BMI 30.86 kg/m     GENERAL APPEARANCE: Well developed, well nourished, in no acute distress.  LUNGS: Lung sounds CTA, no adventitious sounds, respiratory effort normal.  CARD: RRR, S1, S2, without murmurs, gallops, rubs, no JVD, peripheral pulses 2+ and symmetric  ABD: Soft, nondistended and nontender with normal bowel sounds.   MSK: Right sided weakness (baseline post stroke)  EXTREMITIES: No cyanosis, clubbing or edema.  NEURO: Alert and oriented x 3. Normal affect. Sensation to touch was normal. Face is symmetric.  SKIN: Inspection of the skin reveals no rashes, ulcerations or petechiae.  PSYCH: euthymic       Lab Results   Component Value Date    WBC 11.4 07/31/2024     Lab Results   Component Value Date    RBC 4.51 07/31/2024     Lab Results   Component Value Date    HGB 11.6 07/31/2024     Lab Results   Component Value Date    HCT 39.7 07/31/2024     Lab Results   Component Value Date    MCV 88 07/31/2024     Lab Results   Component Value Date    MCH 25.7 07/31/2024     Lab Results   Component Value Date    MCHC 29.2 07/31/2024     Lab Results   Component Value Date    RDW 17.1 07/31/2024     Lab Results   Component Value Date     07/31/2024    Last Comprehensive Metabolic Panel:  Lab Results   Component Value Date     07/31/2024    POTASSIUM 4.5 07/31/2024    CHLORIDE 100 07/31/2024    CO2 24 07/31/2024    ANIONGAP 13 07/31/2024     (H) 07/31/2024    BUN 31.4 (H) 07/31/2024    CR 1.14 (H) 07/31/2024    GFRESTIMATED 53 (L) 07/31/2024 "    AMAURY 9.7 07/31/2024       Assessment/Plan:  History of CVA  Left MCA distribution. S/p decompressive hemicraniectomy due to cerebral edema, and hemorrhagic transformation.  Right sided hemiplegia. Continue statin.   CT scan rescheduled for 12/9  Follow up with Dr. Davis Neurosurgery rescheduled for 12/10     G tube feeding  No changes today. Dietician follows for nutritional and hydration needs.   Adult Formula bolus feeding TID. Weights 178lbs- 188 lbs. Continue to trend weights     Afib  Stable. Not on anticoagulation given brain bleeding. On digoxin for rate control (HR 70-80). 7/24/24 Dig level 1.0.      HTN  No changes today. Well controlled not on BP meds. Bps 100s- 130s/ 70s.      Headache   Chronic without alarm features. Increase in tylenol to 650 mg (2 tablets) q8h    CHF with reduced ejection fraction (H)  No changes today. Reduced EF (ejection fraction LVEF 46%TTE from an outside hospital (6/6/24).  Not on diuretics. Continue jardiance 10 mg. Recommend follow up with cardiology. Weight stable. Appears euvolemic on exam.      Type II Diabetes with current long term use insulin  Blood sugars continue to be low 70s-180s. Decrease insulin glargine further to 13 units BID. Continue Jardiance 10 mg daily. Last A1c 7/16/24 7.4%.   Obtain CBC/BMP/Hgb A1c next lab day     COPD  No respiratory concerns today. Continue ipratropium albuterol nebulizer, Dulera, albuterol PRN.  Room air     CKD stage 3a  No changes today. Last 8/23/24 Creat 1.33; eGFR 44. Avoid nephrotoxins  Obtain BMP next lab day     Normocytic anemia  Stable.  Hbg 10-12  Obtain CBC, iron sats, TIBC, serum iron and ferritin     Depression, recurrent  Insomnia  Requested nursing arrange a visit with [facility] per request of patient.   Continue amitriptyline, mirtazapine and melatonin.  PhQ9 today: 21  Denies HI/SI  Reports to be sleeping okay     Orders:  Labs as above ordered for next lab day  Follow up and CT with Neurosurgery as above  Decrease  glargine to 13 units BID  Increase in Tylenol as seen above    Electronically signed by: CINDY Hodges CNP on 12/4/2024 at 8:37 PM         Sincerely,        CINDY Hodges CNP

## 2024-12-05 ENCOUNTER — LAB REQUISITION (OUTPATIENT)
Dept: LAB | Facility: CLINIC | Age: 69
End: 2024-12-05
Payer: COMMERCIAL

## 2024-12-05 DIAGNOSIS — D64.9 ANEMIA, UNSPECIFIED: ICD-10-CM

## 2024-12-06 ENCOUNTER — CONTACT MOVED (OUTPATIENT)
Age: 69
End: 2024-12-06
Payer: COMMERCIAL

## 2024-12-06 LAB
ANION GAP SERPL CALCULATED.3IONS-SCNC: 7 MMOL/L (ref 7–15)
ANION GAP SERPL CALCULATED.3IONS-SCNC: 7 MMOL/L (ref 7–15)
BUN SERPL-MCNC: 38.6 MG/DL (ref 8–23)
BUN SERPL-MCNC: 38.6 MG/DL (ref 8–23)
CALCIUM SERPL-MCNC: 9.9 MG/DL (ref 8.8–10.4)
CALCIUM SERPL-MCNC: 9.9 MG/DL (ref 8.8–10.4)
CHLORIDE SERPL-SCNC: 103 MMOL/L (ref 98–107)
CHLORIDE SERPL-SCNC: 103 MMOL/L (ref 98–107)
CREAT SERPL-MCNC: 1.52 MG/DL (ref 0.51–0.95)
CREAT SERPL-MCNC: 1.52 MG/DL (ref 0.51–0.95)
EGFRCR SERPLBLD CKD-EPI 2021: 37 ML/MIN/1.73M2
EGFRCR SERPLBLD CKD-EPI 2021: 37 ML/MIN/1.73M2
ERYTHROCYTE [DISTWIDTH] IN BLOOD BY AUTOMATED COUNT: 17.2 % (ref 10–15)
ERYTHROCYTE [DISTWIDTH] IN BLOOD BY AUTOMATED COUNT: 17.2 % (ref 10–15)
EST. AVERAGE GLUCOSE BLD GHB EST-MCNC: 146 MG/DL
EST. AVERAGE GLUCOSE BLD GHB EST-MCNC: 146 MG/DL
FERRITIN SERPL-MCNC: 307 NG/ML (ref 11–328)
FERRITIN SERPL-MCNC: 307 NG/ML (ref 11–328)
GLUCOSE SERPL-MCNC: 88 MG/DL (ref 70–99)
GLUCOSE SERPL-MCNC: 88 MG/DL (ref 70–99)
HBA1C MFR BLD: 6.7 %
HBA1C MFR BLD: 6.7 %
HCO3 SERPL-SCNC: 27 MMOL/L (ref 22–29)
HCO3 SERPL-SCNC: 27 MMOL/L (ref 22–29)
HCT VFR BLD AUTO: 36.9 % (ref 35–47)
HCT VFR BLD AUTO: 36.9 % (ref 35–47)
HGB BLD-MCNC: 11.4 G/DL (ref 11.7–15.7)
HGB BLD-MCNC: 11.4 G/DL (ref 11.7–15.7)
IRON BINDING CAPACITY (ROCHE): 226 UG/DL (ref 240–430)
IRON BINDING CAPACITY (ROCHE): 226 UG/DL (ref 240–430)
IRON SATN MFR SERPL: 24 % (ref 15–46)
IRON SATN MFR SERPL: 24 % (ref 15–46)
IRON SERPL-MCNC: 55 UG/DL (ref 37–145)
IRON SERPL-MCNC: 55 UG/DL (ref 37–145)
MCH RBC QN AUTO: 29.1 PG (ref 26.5–33)
MCH RBC QN AUTO: 29.1 PG (ref 26.5–33)
MCHC RBC AUTO-ENTMCNC: 30.9 G/DL (ref 31.5–36.5)
MCHC RBC AUTO-ENTMCNC: 30.9 G/DL (ref 31.5–36.5)
MCV RBC AUTO: 94 FL (ref 78–100)
MCV RBC AUTO: 94 FL (ref 78–100)
PLATELET # BLD AUTO: 275 10E3/UL (ref 150–450)
PLATELET # BLD AUTO: 275 10E3/UL (ref 150–450)
POTASSIUM SERPL-SCNC: 4.8 MMOL/L (ref 3.4–5.3)
POTASSIUM SERPL-SCNC: 4.8 MMOL/L (ref 3.4–5.3)
RBC # BLD AUTO: 3.92 10E6/UL (ref 3.8–5.2)
RBC # BLD AUTO: 3.92 10E6/UL (ref 3.8–5.2)
SODIUM SERPL-SCNC: 137 MMOL/L (ref 135–145)
SODIUM SERPL-SCNC: 137 MMOL/L (ref 135–145)
WBC # BLD AUTO: 9.2 10E3/UL (ref 4–11)
WBC # BLD AUTO: 9.2 10E3/UL (ref 4–11)

## 2024-12-06 PROCEDURE — P9603 ONE-WAY ALLOW PRORATED MILES: HCPCS | Mod: ORL | Performed by: FAMILY MEDICINE

## 2024-12-06 PROCEDURE — 82728 ASSAY OF FERRITIN: CPT | Mod: ORL | Performed by: FAMILY MEDICINE

## 2024-12-06 PROCEDURE — 83540 ASSAY OF IRON: CPT | Mod: ORL | Performed by: FAMILY MEDICINE

## 2024-12-06 PROCEDURE — 85027 COMPLETE CBC AUTOMATED: CPT | Mod: ORL | Performed by: FAMILY MEDICINE

## 2024-12-06 PROCEDURE — 36415 COLL VENOUS BLD VENIPUNCTURE: CPT | Mod: ORL | Performed by: FAMILY MEDICINE

## 2024-12-06 PROCEDURE — 83550 IRON BINDING TEST: CPT | Mod: ORL | Performed by: FAMILY MEDICINE

## 2024-12-06 PROCEDURE — 83036 HEMOGLOBIN GLYCOSYLATED A1C: CPT | Mod: ORL | Performed by: FAMILY MEDICINE

## 2024-12-06 PROCEDURE — 80048 BASIC METABOLIC PNL TOTAL CA: CPT | Mod: ORL | Performed by: FAMILY MEDICINE

## 2024-12-17 ENCOUNTER — LAB REQUISITION (OUTPATIENT)
Dept: LAB | Facility: CLINIC | Age: 69
End: 2024-12-17
Payer: COMMERCIAL

## 2024-12-17 DIAGNOSIS — Z01.812 ENCOUNTER FOR PREPROCEDURAL LABORATORY EXAMINATION: ICD-10-CM

## 2024-12-18 LAB
ANION GAP SERPL CALCULATED.3IONS-SCNC: 13 MMOL/L (ref 7–15)
BUN SERPL-MCNC: 38 MG/DL (ref 8–23)
CALCIUM SERPL-MCNC: 9.5 MG/DL (ref 8.8–10.4)
CHLORIDE SERPL-SCNC: 104 MMOL/L (ref 98–107)
CREAT SERPL-MCNC: 1.42 MG/DL (ref 0.51–0.95)
EGFRCR SERPLBLD CKD-EPI 2021: 40 ML/MIN/1.73M2
ERYTHROCYTE [DISTWIDTH] IN BLOOD BY AUTOMATED COUNT: 16 % (ref 10–15)
GLUCOSE SERPL-MCNC: 95 MG/DL (ref 70–99)
HCO3 SERPL-SCNC: 22 MMOL/L (ref 22–29)
HCT VFR BLD AUTO: 36.5 % (ref 35–47)
HGB BLD-MCNC: 11.1 G/DL (ref 11.7–15.7)
MCH RBC QN AUTO: 29.3 PG (ref 26.5–33)
MCHC RBC AUTO-ENTMCNC: 30.4 G/DL (ref 31.5–36.5)
MCV RBC AUTO: 96 FL (ref 78–100)
PLATELET # BLD AUTO: 349 10E3/UL (ref 150–450)
POTASSIUM SERPL-SCNC: 5.1 MMOL/L (ref 3.4–5.3)
RBC # BLD AUTO: 3.79 10E6/UL (ref 3.8–5.2)
SODIUM SERPL-SCNC: 139 MMOL/L (ref 135–145)
WBC # BLD AUTO: 9.1 10E3/UL (ref 4–11)

## 2024-12-18 PROCEDURE — 85027 COMPLETE CBC AUTOMATED: CPT | Mod: ORL

## 2024-12-18 PROCEDURE — P9603 ONE-WAY ALLOW PRORATED MILES: HCPCS | Mod: ORL

## 2024-12-18 PROCEDURE — 36415 COLL VENOUS BLD VENIPUNCTURE: CPT | Mod: ORL

## 2024-12-18 PROCEDURE — 80048 BASIC METABOLIC PNL TOTAL CA: CPT | Mod: ORL

## 2024-12-19 ENCOUNTER — TELEPHONE (OUTPATIENT)
Dept: GERIATRICS | Facility: CLINIC | Age: 69
End: 2024-12-19
Payer: COMMERCIAL

## 2024-12-19 RX ORDER — IBUPROFEN 200 MG
300 TABLET ORAL 2 TIMES DAILY PRN
COMMUNITY

## 2024-12-19 NOTE — TELEPHONE ENCOUNTER
[facility]   2024     Name: Roya Burgos   : 1955     Background:  Ibuprofen for headaches    Orders:  Discussed ibuprofen with pharmacy. Discontinue ibuprofen due to cardiac risk with use. Recommend pt follow up with Neurology for further recommendations.     Electronically signed by CINDY Hodges CNP

## 2024-12-19 NOTE — TELEPHONE ENCOUNTER
[facility]   2024     Name: Roya Burgos   : 1955     Background:  Request for ibuprofen for headache    Orders:  300 mg BID PO PRN for headache take with food    Electronically signed by CINDY Hodges CNP

## 2024-12-19 NOTE — TELEPHONE ENCOUNTER
"[facility] Triage Call    Provider: CINDY Stacy CNP  Facility: [facility]  Facility Type:  LTC    Caller: Marilyn  Call Back Number: [phone]    Allergies:  No Known Allergies     SBAR:     S-(situation): Pt is requesting PRN Ibuprofen order for breakthrough pain.    B-(background): Pt is on Tylenol 1000 mg TID.     A-(assessment): Pt currently has a headache rating 9/10. Pt has intermittent headaches. Vitals stable.     R-(recommendations):        Telephone encounter sent to:  CINDY Stacy CNP    Please send response/orders to \"Geriatrics Nurse Pool\"    Natasha Collazo RN      "

## 2024-12-23 ENCOUNTER — OFFICE VISIT (OUTPATIENT)
Dept: GERIATRICS | Facility: CLINIC | Age: 69
End: 2024-12-23
Payer: COMMERCIAL

## 2024-12-23 VITALS
SYSTOLIC BLOOD PRESSURE: 103 MMHG | RESPIRATION RATE: 16 BRPM | TEMPERATURE: 97 F | OXYGEN SATURATION: 95 % | BODY MASS INDEX: 31.91 KG/M2 | HEART RATE: 88 BPM | HEIGHT: 63 IN | DIASTOLIC BLOOD PRESSURE: 64 MMHG | WEIGHT: 180.1 LBS

## 2024-12-23 DIAGNOSIS — J44.9 CHRONIC OBSTRUCTIVE PULMONARY DISEASE, UNSPECIFIED COPD TYPE (H): ICD-10-CM

## 2024-12-23 DIAGNOSIS — I10 ESSENTIAL HYPERTENSION: ICD-10-CM

## 2024-12-23 DIAGNOSIS — D64.9 NORMOCYTIC ANEMIA: ICD-10-CM

## 2024-12-23 DIAGNOSIS — Z86.73 HISTORY OF CVA (CEREBROVASCULAR ACCIDENT): Primary | ICD-10-CM

## 2024-12-23 DIAGNOSIS — Z79.4 TYPE 2 DIABETES MELLITUS TREATED WITH INSULIN (H): ICD-10-CM

## 2024-12-23 DIAGNOSIS — N18.31 STAGE 3A CHRONIC KIDNEY DISEASE (H): ICD-10-CM

## 2024-12-23 DIAGNOSIS — I48.0 PAROXYSMAL ATRIAL FIBRILLATION (H): ICD-10-CM

## 2024-12-23 DIAGNOSIS — Z93.1 G TUBE FEEDINGS (H): ICD-10-CM

## 2024-12-23 DIAGNOSIS — I50.20 HFREF (HEART FAILURE WITH REDUCED EJECTION FRACTION) (H): ICD-10-CM

## 2024-12-23 DIAGNOSIS — E11.9 TYPE 2 DIABETES MELLITUS TREATED WITH INSULIN (H): ICD-10-CM

## 2024-12-23 PROCEDURE — 99309 SBSQ NF CARE MODERATE MDM 30: CPT

## 2024-12-23 NOTE — LETTER
12/23/2024      Roya Burgos  C/o Miles Burgos  [address] [facility] [address]  [facility] [address]        Preoperative Evaluation  [facility]  [address] [place] [address]  Phone: [phone]  Fax: [phone]  Primary Provider: CINDY Hodges CNP  Pre-op Performing Provider: CINDY Hodges CNP  Dec 23, 2024         12/23/2024   Surgical Information   What procedure is being done? cranioplasty   Date of surgery / procedure: 1/6/24   Where do you plan to recover after surgery? at a nursing home     Fax number for surgical facility: Note does not need to be faxed, will be available electronically in Epic.    Assessment & Plan    The proposed surgical procedure is considered INTERMEDIATE risk.    History of CVA  Left MCA distribution. S/p decompressive hemicraniectomy due to cerebral edema, and hemorrhagic transformation.  Right sided hemiplegia. Continue statin.   Cranioplasty 1/6/24     G tube feeding  Stable Dietician follows for nutritional and hydration needs.   Adult Formula bolus feeding TID. Weights 178lbs- 188 lbs. Continue to trend weights     Afib  No changes today. Not on anticoagulation given brain bleeding. On digoxin for rate control (HR 70-80). 7/24/24 Dig level 1.0.      HTN  Stable. Well controlled not on BP meds. Bps 100s- 130s/ 70s.      Headache   Chronic without alarm features.Likely due to craniectomy and will improve with cranioplasty. Discussed with PharmD and she recommends reaching out for guidance from Neurosurgery. Outreach to Neurosurgery, awaiting return call.     CHF with reduced ejection fraction (H)  Stable Reduced EF (ejection fraction LVEF 46%TTE from an outside hospital (6/6/24).  Not on diuretics. Continue jardiance 10 mg. Recommend follow up with cardiology. Weight stable. Appears euvolemic on exam.      Type II Diabetes with current long term use insulin  Blood sugars continue to be low 70s-180s. Decrease  insulin glargine further to 13 units BID. Continue Jardiance 10 mg daily. Last A1c 7/16/24 7.4%.   Recommend A1c at next visit    COPD  Stable. Continue ipratropium albuterol nebulizer, Dulera, albuterol PRN.  Room air     CKD stage 3a  Stable. Last 12/18/24 Creat 1.42; eGFR 5.. Avoid nephrotoxins    Normocytic anemia  Stable.  Hbg 10-12  Recommend obtaining iron studies at next visit     Risks and Recommendations  The patient has the following additional risks and recommendations for perioperative complications: none    Antiplatelet or Anticoagulation Medication Instructions   - Patient is on no antiplatelet or anticoagulation medications.    Diabetic medications  Only give 1/2 dose glargine night prior to surgery   Hold glargine the day of surgery  Hold jardiance the day of surgery    Recommendation  Approval given to proceed with proposed procedure, without further diagnostic evaluation.    Subjective  Roya is a 69 year old, presenting for the following:  Preoperative exam.     HPI related to upcoming procedure: Roya has a history of a Left MCA distribution  S/p decompressive hemicraniectomy due to cerebral edema, and hemorrhagic transformation. She has been suffering from headaches with the hemicraniectomy. Blood sugars have been stable. Blood pressures stable. Continues with tube feeding. Bowel and bladder working. Denies chest pain, shortness of breath, palpitations.         12/23/2024   Pre-Op Questionnaire   Have you ever had a heart attack or stroke? (!) YES    Have you ever had surgery on your heart or blood vessels, such as a stent placement, a coronary artery bypass, or surgery on an artery in your head, neck, heart, or legs? (!) UNKNOWN    Do you have chest pain with activity? No   Do you have a history of heart failure? (!) YES    Do you currently have a cold, bronchitis or symptoms of other infection? No   Do you have a cough, shortness of breath, or wheezing? No   Do you or anyone in your  family have previous history of blood clots? No   Do you or does anyone in your family have a serious bleeding problem such as prolonged bleeding following surgeries or cuts? No   Have you ever had problems with anemia or been told to take iron pills? No   Have you had any abnormal blood loss such as black, tarry or bloody stools, or abnormal vaginal bleeding? No   Have you ever had a blood transfusion? (!) UNKNOWN   Have you or any of your relatives ever had problems with anesthesia? (!) UNKNOWN    Do you have sleep apnea, excessive snoring or daytime drowsiness? No   Do you have any artifical heart valves or other implanted medical devices like a pacemaker, defibrillator, or continuous glucose monitor? No   Do you have artificial joints? No   Are you allergic to latex? No     Health Care Directive  Patient does not have a Health Care Directive:Patient is a FULL CODE in facility [facility]    Preoperative Review of    reviewed - no record of controlled substances prescribed.    Patient Active Problem List    Diagnosis Date Noted     Obesity (BMI 30-39.9) 09/12/2024     Priority: Medium     Solitary benign cyst of right breast 09/12/2024     Priority: Medium     Obesity, morbid (H) 07/29/2024     Priority: Medium     Normocytic anemia 07/29/2024     Priority: Medium     On tube feeding diet 07/29/2024     Priority: Medium     Urinary frequency 07/16/2024     Priority: Medium     Unable to care for self 07/16/2024     Priority: Medium     Adult failure to thrive 07/16/2024     Priority: Medium     Metabolic encephalopathy 07/13/2024     Priority: Medium     Altered mental status 07/07/2024     Priority: Medium     Hemiplegia and hemiparesis following cerebral infarction affecting unspecified side (H) 07/05/2024     Priority: Medium     Need for continuous supervision 07/05/2024     Priority: Medium     Generalized muscle weakness 06/30/2024     Priority: Medium     Nonintractable headache 06/26/2024     Priority:  Medium     Bed confinement status 06/25/2024     Priority: Medium     Weakness 06/25/2024     Priority: Medium     Flaccid hemiplegia as late effect of cerebrovascular disease (H) 06/25/2024     Priority: Medium     Acute systolic heart failure (H) 06/21/2024     Priority: Medium     Recent cerebrovascular accident (CVA) 06/21/2024     Priority: Medium     Acute on chronic anemia 06/21/2024     Priority: Medium     Acute renal failure (H) 06/08/2024     Priority: Medium     HFrEF (heart failure with reduced ejection fraction) (H) 06/07/2024     Priority: Medium     Cerebral edema (H) 06/07/2024     Priority: Medium     Acute encephalopathy 06/07/2024     Priority: Medium     Nontraumatic hemorrhage of left cerebral hemisphere (H) 06/07/2024     Priority: Medium     Nontraumatic cortical hemorrhage of left cerebral hemisphere (H) 06/07/2024     Priority: Medium     Atrial flutter with rapid ventricular response (H) 05/31/2024     Priority: Medium     Aphasia following cerebrovascular accident (CVA) 05/31/2024     Priority: Medium     Dysphagia 05/31/2024     Priority: Medium     Impaired gait and mobility 05/31/2024     Priority: Medium     Impaired cognition 05/31/2024     Priority: Medium     Pain 05/31/2024     Priority: Medium     Weakness due to acute cerebrovascular accident (CVA) (H) 05/31/2024     Priority: Medium     Dysphagia following cerebrovascular disease 05/31/2024     Priority: Medium     Right hemiplegia (H) 05/31/2024     Priority: Medium     Morbid obesity (H) 05/30/2024     Priority: Medium     Cardiomyopathy (H) 05/28/2024     Priority: Medium     Paroxysmal atrial fibrillation (H) 05/28/2024     Priority: Medium     Atrial fibrillation with RVR (H) 05/28/2024     Priority: Medium     Sinus tachycardia 05/27/2024     Priority: Medium     Acute respiratory failure (H) 05/24/2024     Priority: Medium     Intracranial hypertension 05/24/2024     Priority: Medium     Midline shift of brain with brain  compression (H) 05/24/2024     Priority: Medium     Ischemic cerebrovascular accident (CVA) (H) 05/24/2024     Priority: Medium     Depression, recurrent (H) 04/17/2024     Priority: Medium     Nephrotic syndrome 06/28/2022     Priority: Medium     Formatting of this note might be different from the original.   Significant proteinuria w/ low albumin concern for nephroitic disease.  Not needing anticoagulation w/ current albumin level.  Called Pt w/ results and referral to nephrology - seen 7/2022 plan for renal US and follow-up 9/2022.  Edema improved on Fardixa       Dyslipidemia 06/27/2022     Priority: Medium     Formatting of this note might be different from the original.   The 10-year ASCVD risk score (Kinmundy MONIE Jr., et al., 2013) is: 68.6%         Last Assessment & Plan:    Formatting of this note might be different from the original.   Room to go up on atorvastatin, discuss at follow-up       Localized edema 06/27/2022     Priority: Medium     Formatting of this note might be different from the original.   On gabapentin, possible contributer       Last Assessment & Plan:    Formatting of this note might be different from the original.   Bilateral peripheral edema.  Suspect veneous insufficiency.  Labs to have for liver problems.  No murmur to suggest HF, lungs clear   - encouraged compress socks   - room to go up on HCTZ       Pain in joint 06/27/2022     Priority: Medium     Last Assessment & Plan:    Formatting of this note might be different from the original.   End of visit concern in ankle grossly swollen.  Discussed prednisone contraindicated w/ current conditions.     - Can trial diclofenac gel and discuss more at follow-up       Type 2 diabetes mellitus treated with insulin (H) 06/27/2022     Priority: Medium     Formatting of this note might be different from the original.   Longstanding w/ associated nephropathy.  On insulin       Last Assessment & Plan:    Formatting of this note might be different  from the original.   Will stop glipizide and start invokana to help w/ renal function as well as address swelling.  Try to get of prandial insulin but himanshu FHx       Stage 3 chronic kidney disease (H) 10/23/2020     Priority: Medium     Formatting of this note might be different from the original.   History of HTN and DM       Microalbuminuria 01/27/2020     Priority: Medium     Onychomycosis 01/27/2020     Priority: Medium     Long term current use of opiate analgesic 09/10/2018     Priority: Medium     Arthralgia of right ankle 12/21/2016     Priority: Medium     Pain in left leg 12/21/2016     Priority: Medium     Hypertension due to endocrine disorder 04/06/2016     Priority: Medium     Peripheral arterial occlusive disease (H) 01/25/2016     Priority: Medium     PAD (peripheral artery disease) (H) 01/25/2016     Priority: Medium     Depression 09/08/2014     Priority: Medium     Type 2 diabetes mellitus with stage 3 chronic kidney disease, with long-term current use of insulin, unspecified whether stage 3a or 3b CKD (H) 09/08/2014     Priority: Medium     HTN (hypertension) 09/08/2014     Priority: Medium     Major depressive disorder 03/04/2009     Priority: Medium     COPD exacerbation (H) 01/25/2009     Priority: Medium     Tobacco use disorder 01/25/2009     Priority: Medium     Formatting of this note might be different from the original.   Longstanding 1/2 PPD.  Quit attempts in the past       Last Assessment & Plan:    Formatting of this note might be different from the original.   Counseling and encouragement given.  Dual anti-nicotine therapy given       Primary hypertension 01/25/2009     Priority: Medium     Formatting of this note might be different from the original.   On losartan, carvedilol and HCTZ       Last Assessment & Plan:    Formatting of this note might be different from the original.   Encouraged home BP cuff. Not at goal today, encouraged to monitor at home and anticipate increasing  HCTZ       Diabetes mellitus (H) 11/04/2008     Priority: Medium     Formatting of this note might be different from the original.   Controlled w/ diet and exercise   ICD 10       Hyperlipidemia 11/04/2008     Priority: Medium     Formatting of this note might be different from the original.   Overview:    ICD 10  Formatting of this note might be different from the original.   ICD 10        Past Medical History:   Diagnosis Date     Diabetes mellitus, type II, insulin dependent (H)      HTN (hypertension)      Major depression      Past Surgical History:   Procedure Laterality Date     ANKLE SURGERY       IR LOWER EXTREMITY ANGIOGRAM LEFT  4/6/2016     IR LOWER EXTREMITY ANGIOGRAM LEFT  2/3/2016     Current Outpatient Medications   Medication Sig Dispense Refill     diclofenac (VOLTAREN) 1 % topical gel Apply 2 g topically 4 times daily.       acetaminophen (TYLENOL) 500 MG tablet Take 1,000 mg by mouth 3 times daily.       albuterol (PROAIR HFA/PROVENTIL HFA/VENTOLIN HFA) 108 (90 Base) MCG/ACT inhaler Inhale 2 puffs into the lungs every 4 hours as needed for shortness of breath, wheezing or cough 18 g 0     amitriptyline (ELAVIL) 10 MG tablet Take 10 mg by mouth at bedtime.       atorvastatin (LIPITOR) 40 MG tablet Take 40 mg by mouth daily.       digoxin (LANOXIN) 125 MCG tablet Take 125 mcg by mouth daily.       empagliflozin (JARDIANCE) 10 MG TABS tablet Take 1 tablet (10 mg) by mouth or Feeding Tube daily for 180 days 90 tablet 1     insulin glargine (LANTUS PEN) 100 UNIT/ML pen Inject 13 Units subcutaneously 2 times daily.       melatonin 3 MG tablet Take 6 mg by mouth at bedtime.       mirtazapine (REMERON) 30 MG tablet Take 30 mg by mouth at bedtime.       mometasone-formoterol (DULERA) 100-5 MCG/ACT inhaler Inhale 2 puffs into the lungs 2 times daily.       No current facility-administered medications for this visit.      No Known Allergies     Social History     Tobacco Use     Smoking status: Former      "Current packs/day: 0.00     Types: Cigarettes     Smokeless tobacco: Not on file   Substance Use Topics     Alcohol use: No     Objective   Vital signs:/64   Pulse 88   Temp 97  F (36.1  C)   Resp 16   Ht 1.6 m (5' 3\")   Wt 81.7 kg (180 lb 1.6 oz)   SpO2 95%   BMI 31.90 kg/m     GENERAL APPEARANCE: Well developed, well nourished, in no acute distress.  LUNGS: Lung sounds CTA, no adventitious sounds, respiratory effort normal.  CARD: RRR, S1, S2, without murmurs, gallops, rubs, no JVD, peripheral pulses 2+ and symmetric  ABD: Soft, nondistended and nontender with normal bowel sounds.   MSK: Muscle strength and tone were equal bilaterally. Moves all extremities easily and intentionally.   EXTREMITIES: No cyanosis, clubbing or edema.  NEURO: Alert and oriented x 3. Normal affect. Sensation to touch was normal. Face is symmetric.  SKIN: Inspection of the skin reveals no rashes, ulcerations or petechiae.  PSYCH: euthymic     Diagnostics  Lab Results   Component Value Date    WBC 9.1 12/18/2024     Lab Results   Component Value Date    RBC 3.79 12/18/2024     Lab Results   Component Value Date    HGB 11.1 12/18/2024     Lab Results   Component Value Date    HCT 36.5 12/18/2024     Lab Results   Component Value Date    MCV 96 12/18/2024     Lab Results   Component Value Date    MCH 29.3 12/18/2024     Lab Results   Component Value Date    MCHC 30.4 12/18/2024     Lab Results   Component Value Date    RDW 16.0 12/18/2024     Lab Results   Component Value Date     12/18/2024    Last Comprehensive Metabolic Panel:  Lab Results   Component Value Date     12/18/2024    POTASSIUM 5.1 12/18/2024    CHLORIDE 104 12/18/2024    CO2 22 12/18/2024    ANIONGAP 13 12/18/2024    GLC 95 12/18/2024    BUN 38.0 (H) 12/18/2024    CR 1.42 (H) 12/18/2024    GFRESTIMATED 40 (L) 12/18/2024    AMAURY 9.5 12/18/2024      EKG: sinus rhythm, AV block type I, incomplete right bunch branch block, Left anterior fascicular block, " interior infarction, probably old, abnormal repolarization, possible coronary ischemia    Revised Cardiac Risk Index (RCRI)  The patient has the following serious cardiovascular risks for perioperative complications:   - Congestive Heart Failure (pulmonary edema, PND, s3 shannan, CXR with pulmonary congestion, basilar rales) = 1 point   - Cerebrovascular Disease (TIA or CVA) = 1 point   - Diabetes Mellitus (on Insulin) = 1 point     RCRI Interpretation: 3 points: Class IV (high risk - >11% complication rate)    Estimated Functional Capacity: CANNOT perform 4 METS without symptoms    Signed Electronically by: CINDY Hodges CNP      Sincerely,        CINDY Hodges CNP    Electronically signed

## 2024-12-23 NOTE — PROGRESS NOTES
Preoperative Evaluation  [facility]ERIATRICS  [address] [place] [address]  Phone: [phone]  Fax: [phone]  Primary Provider: CINDY Hodges CNP  Pre-op Performing Provider: CINDY Hodges CNP  Dec 23, 2024         12/23/2024   Surgical Information   What procedure is being done? cranioplasty   Date of surgery / procedure: 1/6/24   Where do you plan to recover after surgery? at a nursing home     Fax number for surgical facility: Note does not need to be faxed, will be available electronically in Epic.    Assessment & Plan     The proposed surgical procedure is considered INTERMEDIATE risk.    History of CVA  Left MCA distribution. S/p decompressive hemicraniectomy due to cerebral edema, and hemorrhagic transformation.  Right sided hemiplegia. Continue statin.   Cranioplasty 1/6/24     G tube feeding  Stable Dietician follows for nutritional and hydration needs.   Adult Formula bolus feeding TID. Weights 178lbs- 188 lbs. Continue to trend weights     Afib  No changes today. Not on anticoagulation given brain bleeding. On digoxin for rate control (HR 70-80). 7/24/24 Dig level 1.0.      HTN  Stable. Well controlled not on BP meds. Bps 100s- 130s/ 70s.      Headache   Chronic without alarm features.Likely due to craniectomy and will improve with cranioplasty. Discussed with PharmD and she recommends reaching out for guidance from Neurosurgery. Outreach to Neurosurgery, awaiting return call.     CHF with reduced ejection fraction (H)  Stable Reduced EF (ejection fraction LVEF 46%TTE from an outside hospital (6/6/24).  Not on diuretics. Continue jardiance 10 mg. Recommend follow up with cardiology. Weight stable. Appears euvolemic on exam.      Type II Diabetes with current long term use insulin  Blood sugars continue to be low 70s-180s. Decrease insulin glargine further to 13 units BID. Continue Jardiance 10 mg daily. Last A1c 7/16/24 7.4%.   Recommend  A1c at next visit    COPD  Stable. Continue ipratropium albuterol nebulizer, Dulera, albuterol PRN.  Room air     CKD stage 3a  Stable. Last 12/18/24 Creat 1.42; eGFR 5.. Avoid nephrotoxins    Normocytic anemia  Stable.  Hbg 10-12  Recommend obtaining iron studies at next visit     Risks and Recommendations  The patient has the following additional risks and recommendations for perioperative complications: none    Antiplatelet or Anticoagulation Medication Instructions   - Patient is on no antiplatelet or anticoagulation medications.    Diabetic medications  Only give 1/2 dose glargine night prior to surgery   Hold glargine the day of surgery  Hold jardiance the day of surgery    Recommendation  Approval given to proceed with proposed procedure, without further diagnostic evaluation.    Subjective   Roya is a 69 year old, presenting for the following:  Preoperative exam.     HPI related to upcoming procedure: Roya has a history of a Left MCA distribution  S/p decompressive hemicraniectomy due to cerebral edema, and hemorrhagic transformation. She has been suffering from headaches with the hemicraniectomy. Blood sugars have been stable. Blood pressures stable. Continues with tube feeding. Bowel and bladder working. Denies chest pain, shortness of breath, palpitations.         12/23/2024   Pre-Op Questionnaire   Have you ever had a heart attack or stroke? (!) YES    Have you ever had surgery on your heart or blood vessels, such as a stent placement, a coronary artery bypass, or surgery on an artery in your head, neck, heart, or legs? (!) UNKNOWN    Do you have chest pain with activity? No   Do you have a history of heart failure? (!) YES    Do you currently have a cold, bronchitis or symptoms of other infection? No   Do you have a cough, shortness of breath, or wheezing? No   Do you or anyone in your family have previous history of blood clots? No   Do you or does anyone in your family have a serious bleeding  problem such as prolonged bleeding following surgeries or cuts? No   Have you ever had problems with anemia or been told to take iron pills? No   Have you had any abnormal blood loss such as black, tarry or bloody stools, or abnormal vaginal bleeding? No   Have you ever had a blood transfusion? (!) UNKNOWN   Have you or any of your relatives ever had problems with anesthesia? (!) UNKNOWN    Do you have sleep apnea, excessive snoring or daytime drowsiness? No   Do you have any artifical heart valves or other implanted medical devices like a pacemaker, defibrillator, or continuous glucose monitor? No   Do you have artificial joints? No   Are you allergic to latex? No     Health Care Directive  Patient does not have a Health Care Directive:Patient is a FULL CODE in facility [facility]    Preoperative Review of    reviewed - no record of controlled substances prescribed.    Patient Active Problem List    Diagnosis Date Noted    Obesity (BMI 30-39.9) 09/12/2024     Priority: Medium    Solitary benign cyst of right breast 09/12/2024     Priority: Medium    Obesity, morbid (H) 07/29/2024     Priority: Medium    Normocytic anemia 07/29/2024     Priority: Medium    On tube feeding diet 07/29/2024     Priority: Medium    Urinary frequency 07/16/2024     Priority: Medium    Unable to care for self 07/16/2024     Priority: Medium    Adult failure to thrive 07/16/2024     Priority: Medium    Metabolic encephalopathy 07/13/2024     Priority: Medium    Altered mental status 07/07/2024     Priority: Medium    Hemiplegia and hemiparesis following cerebral infarction affecting unspecified side (H) 07/05/2024     Priority: Medium    Need for continuous supervision 07/05/2024     Priority: Medium    Generalized muscle weakness 06/30/2024     Priority: Medium    Nonintractable headache 06/26/2024     Priority: Medium    Bed confinement status 06/25/2024     Priority: Medium    Weakness 06/25/2024     Priority: Medium    Flaccid  hemiplegia as late effect of cerebrovascular disease (H) 06/25/2024     Priority: Medium    Acute systolic heart failure (H) 06/21/2024     Priority: Medium    Recent cerebrovascular accident (CVA) 06/21/2024     Priority: Medium    Acute on chronic anemia 06/21/2024     Priority: Medium    Acute renal failure (H) 06/08/2024     Priority: Medium    HFrEF (heart failure with reduced ejection fraction) (H) 06/07/2024     Priority: Medium    Cerebral edema (H) 06/07/2024     Priority: Medium    Acute encephalopathy 06/07/2024     Priority: Medium    Nontraumatic hemorrhage of left cerebral hemisphere (H) 06/07/2024     Priority: Medium    Nontraumatic cortical hemorrhage of left cerebral hemisphere (H) 06/07/2024     Priority: Medium    Atrial flutter with rapid ventricular response (H) 05/31/2024     Priority: Medium    Aphasia following cerebrovascular accident (CVA) 05/31/2024     Priority: Medium    Dysphagia 05/31/2024     Priority: Medium    Impaired gait and mobility 05/31/2024     Priority: Medium    Impaired cognition 05/31/2024     Priority: Medium    Pain 05/31/2024     Priority: Medium    Weakness due to acute cerebrovascular accident (CVA) (H) 05/31/2024     Priority: Medium    Dysphagia following cerebrovascular disease 05/31/2024     Priority: Medium    Right hemiplegia (H) 05/31/2024     Priority: Medium    Morbid obesity (H) 05/30/2024     Priority: Medium    Cardiomyopathy (H) 05/28/2024     Priority: Medium    Paroxysmal atrial fibrillation (H) 05/28/2024     Priority: Medium    Atrial fibrillation with RVR (H) 05/28/2024     Priority: Medium    Sinus tachycardia 05/27/2024     Priority: Medium    Acute respiratory failure (H) 05/24/2024     Priority: Medium    Intracranial hypertension 05/24/2024     Priority: Medium    Midline shift of brain with brain compression (H) 05/24/2024     Priority: Medium    Ischemic cerebrovascular accident (CVA) (H) 05/24/2024     Priority: Medium    Depression,  recurrent (H) 04/17/2024     Priority: Medium    Nephrotic syndrome 06/28/2022     Priority: Medium     Formatting of this note might be different from the original.   Significant proteinuria w/ low albumin concern for nephroitic disease.  Not needing anticoagulation w/ current albumin level.  Called Pt w/ results and referral to nephrology - seen 7/2022 plan for renal US and follow-up 9/2022.  Edema improved on Fardixa      Dyslipidemia 06/27/2022     Priority: Medium     Formatting of this note might be different from the original.   The 10-year ASCVD risk score (Zelalem MONIE Jr., et al., 2013) is: 68.6%         Last Assessment & Plan:    Formatting of this note might be different from the original.   Room to go up on atorvastatin, discuss at follow-up      Localized edema 06/27/2022     Priority: Medium     Formatting of this note might be different from the original.   On gabapentin, possible contributer       Last Assessment & Plan:    Formatting of this note might be different from the original.   Bilateral peripheral edema.  Suspect veneous insufficiency.  Labs to have for liver problems.  No murmur to suggest HF, lungs clear   - encouraged compress socks   - room to go up on HCTZ      Pain in joint 06/27/2022     Priority: Medium     Last Assessment & Plan:    Formatting of this note might be different from the original.   End of visit concern in ankle grossly swollen.  Discussed prednisone contraindicated w/ current conditions.     - Can trial diclofenac gel and discuss more at follow-up      Type 2 diabetes mellitus treated with insulin (H) 06/27/2022     Priority: Medium     Formatting of this note might be different from the original.   Longstanding w/ associated nephropathy.  On insulin       Last Assessment & Plan:    Formatting of this note might be different from the original.   Will stop glipizide and start invokana to help w/ renal function as well as address swelling.  Try to get of prandial insulin  but himanshu FHx      Stage 3 chronic kidney disease (H) 10/23/2020     Priority: Medium     Formatting of this note might be different from the original.   History of HTN and DM      Microalbuminuria 01/27/2020     Priority: Medium    Onychomycosis 01/27/2020     Priority: Medium    Long term current use of opiate analgesic 09/10/2018     Priority: Medium    Arthralgia of right ankle 12/21/2016     Priority: Medium    Pain in left leg 12/21/2016     Priority: Medium    Hypertension due to endocrine disorder 04/06/2016     Priority: Medium    Peripheral arterial occlusive disease (H) 01/25/2016     Priority: Medium    PAD (peripheral artery disease) (H) 01/25/2016     Priority: Medium    Depression 09/08/2014     Priority: Medium    Type 2 diabetes mellitus with stage 3 chronic kidney disease, with long-term current use of insulin, unspecified whether stage 3a or 3b CKD (H) 09/08/2014     Priority: Medium    HTN (hypertension) 09/08/2014     Priority: Medium    Major depressive disorder 03/04/2009     Priority: Medium    COPD exacerbation (H) 01/25/2009     Priority: Medium    Tobacco use disorder 01/25/2009     Priority: Medium     Formatting of this note might be different from the original.   Longstanding 1/2 PPD.  Quit attempts in the past       Last Assessment & Plan:    Formatting of this note might be different from the original.   Counseling and encouragement given.  Dual anti-nicotine therapy given      Primary hypertension 01/25/2009     Priority: Medium     Formatting of this note might be different from the original.   On losartan, carvedilol and HCTZ       Last Assessment & Plan:    Formatting of this note might be different from the original.   Encouraged home BP cuff. Not at goal today, encouraged to monitor at home and anticipate increasing HCTZ      Diabetes mellitus (H) 11/04/2008     Priority: Medium     Formatting of this note might be different from the original.   Controlled w/ diet and  exercise   ICD 10      Hyperlipidemia 11/04/2008     Priority: Medium     Formatting of this note might be different from the original.   Overview:    ICD 10  Formatting of this note might be different from the original.   ICD 10        Past Medical History:   Diagnosis Date    Diabetes mellitus, type II, insulin dependent (H)     HTN (hypertension)     Major depression      Past Surgical History:   Procedure Laterality Date    ANKLE SURGERY      IR LOWER EXTREMITY ANGIOGRAM LEFT  4/6/2016    IR LOWER EXTREMITY ANGIOGRAM LEFT  2/3/2016     Current Outpatient Medications   Medication Sig Dispense Refill    diclofenac (VOLTAREN) 1 % topical gel Apply 2 g topically 4 times daily.      acetaminophen (TYLENOL) 500 MG tablet Take 1,000 mg by mouth 3 times daily.      albuterol (PROAIR HFA/PROVENTIL HFA/VENTOLIN HFA) 108 (90 Base) MCG/ACT inhaler Inhale 2 puffs into the lungs every 4 hours as needed for shortness of breath, wheezing or cough 18 g 0    amitriptyline (ELAVIL) 10 MG tablet Take 10 mg by mouth at bedtime.      atorvastatin (LIPITOR) 40 MG tablet Take 40 mg by mouth daily.      digoxin (LANOXIN) 125 MCG tablet Take 125 mcg by mouth daily.      empagliflozin (JARDIANCE) 10 MG TABS tablet Take 1 tablet (10 mg) by mouth or Feeding Tube daily for 180 days 90 tablet 1    insulin glargine (LANTUS PEN) 100 UNIT/ML pen Inject 13 Units subcutaneously 2 times daily.      melatonin 3 MG tablet Take 6 mg by mouth at bedtime.      mirtazapine (REMERON) 30 MG tablet Take 30 mg by mouth at bedtime.      mometasone-formoterol (DULERA) 100-5 MCG/ACT inhaler Inhale 2 puffs into the lungs 2 times daily.       No current facility-administered medications for this visit.      No Known Allergies     Social History     Tobacco Use    Smoking status: Former     Current packs/day: 0.00     Types: Cigarettes    Smokeless tobacco: Not on file   Substance Use Topics    Alcohol use: No     Objective    Vital signs:/64   Pulse 88    "Temp 97  F (36.1  C)   Resp 16   Ht 1.6 m (5' 3\")   Wt 81.7 kg (180 lb 1.6 oz)   SpO2 95%   BMI 31.90 kg/m     GENERAL APPEARANCE: Well developed, well nourished, in no acute distress.  LUNGS: Lung sounds CTA, no adventitious sounds, respiratory effort normal.  CARD: RRR, S1, S2, without murmurs, gallops, rubs, no JVD, peripheral pulses 2+ and symmetric  ABD: Soft, nondistended and nontender with normal bowel sounds.   MSK: Muscle strength and tone were equal bilaterally. Moves all extremities easily and intentionally.   EXTREMITIES: No cyanosis, clubbing or edema.  NEURO: Alert and oriented x 3. Normal affect. Sensation to touch was normal. Face is symmetric.  SKIN: Inspection of the skin reveals no rashes, ulcerations or petechiae.  PSYCH: euthymic     Diagnostics  Lab Results   Component Value Date    WBC 9.1 12/18/2024     Lab Results   Component Value Date    RBC 3.79 12/18/2024     Lab Results   Component Value Date    HGB 11.1 12/18/2024     Lab Results   Component Value Date    HCT 36.5 12/18/2024     Lab Results   Component Value Date    MCV 96 12/18/2024     Lab Results   Component Value Date    MCH 29.3 12/18/2024     Lab Results   Component Value Date    MCHC 30.4 12/18/2024     Lab Results   Component Value Date    RDW 16.0 12/18/2024     Lab Results   Component Value Date     12/18/2024    Last Comprehensive Metabolic Panel:  Lab Results   Component Value Date     12/18/2024    POTASSIUM 5.1 12/18/2024    CHLORIDE 104 12/18/2024    CO2 22 12/18/2024    ANIONGAP 13 12/18/2024    GLC 95 12/18/2024    BUN 38.0 (H) 12/18/2024    CR 1.42 (H) 12/18/2024    GFRESTIMATED 40 (L) 12/18/2024    AMAURY 9.5 12/18/2024      EKG: sinus rhythm, AV block type I, incomplete right bunch branch block, Left anterior fascicular block, interior infarction, probably old, abnormal repolarization, possible coronary ischemia    Revised Cardiac Risk Index (RCRI)  The patient has the following serious cardiovascular " risks for perioperative complications:   - Congestive Heart Failure (pulmonary edema, PND, s3 shannan, CXR with pulmonary congestion, basilar rales) = 1 point   - Cerebrovascular Disease (TIA or CVA) = 1 point   - Diabetes Mellitus (on Insulin) = 1 point     RCRI Interpretation: 3 points: Class IV (high risk - >11% complication rate)    Estimated Functional Capacity: CANNOT perform 4 METS without symptoms    Signed Electronically by: CINDY Hodges CNP

## 2025-01-21 ENCOUNTER — PATIENT OUTREACH (OUTPATIENT)
Dept: GERIATRIC MEDICINE | Facility: CLINIC | Age: 70
End: 2025-01-21
Payer: COMMERCIAL

## 2025-01-22 ENCOUNTER — NURSING HOME VISIT (OUTPATIENT)
Dept: GERIATRICS | Facility: CLINIC | Age: 70
End: 2025-01-22
Payer: COMMERCIAL

## 2025-01-22 VITALS
TEMPERATURE: 98.2 F | SYSTOLIC BLOOD PRESSURE: 128 MMHG | OXYGEN SATURATION: 96 % | DIASTOLIC BLOOD PRESSURE: 66 MMHG | WEIGHT: 180.8 LBS | RESPIRATION RATE: 17 BRPM | HEART RATE: 77 BPM | BODY MASS INDEX: 32.03 KG/M2

## 2025-01-22 DIAGNOSIS — Z98.890 HISTORY OF CRANIOPLASTY: ICD-10-CM

## 2025-01-22 DIAGNOSIS — E11.22 TYPE 2 DIABETES MELLITUS WITH STAGE 3 CHRONIC KIDNEY DISEASE, WITH LONG-TERM CURRENT USE OF INSULIN, UNSPECIFIED WHETHER STAGE 3A OR 3B CKD (H): ICD-10-CM

## 2025-01-22 DIAGNOSIS — N18.32 CHRONIC KIDNEY DISEASE, STAGE 3B (H): ICD-10-CM

## 2025-01-22 DIAGNOSIS — I10 ESSENTIAL HYPERTENSION: ICD-10-CM

## 2025-01-22 DIAGNOSIS — N18.31 STAGE 3A CHRONIC KIDNEY DISEASE (H): Primary | ICD-10-CM

## 2025-01-22 DIAGNOSIS — Z86.73 HISTORY OF CVA (CEREBROVASCULAR ACCIDENT): ICD-10-CM

## 2025-01-22 DIAGNOSIS — D64.9 NORMOCYTIC ANEMIA: ICD-10-CM

## 2025-01-22 DIAGNOSIS — I69.951: ICD-10-CM

## 2025-01-22 DIAGNOSIS — N18.30 TYPE 2 DIABETES MELLITUS WITH STAGE 3 CHRONIC KIDNEY DISEASE, WITH LONG-TERM CURRENT USE OF INSULIN, UNSPECIFIED WHETHER STAGE 3A OR 3B CKD (H): ICD-10-CM

## 2025-01-22 DIAGNOSIS — Z79.4 TYPE 2 DIABETES MELLITUS WITH STAGE 3 CHRONIC KIDNEY DISEASE, WITH LONG-TERM CURRENT USE OF INSULIN, UNSPECIFIED WHETHER STAGE 3A OR 3B CKD (H): ICD-10-CM

## 2025-01-22 DIAGNOSIS — I48.0 PAROXYSMAL ATRIAL FIBRILLATION (H): ICD-10-CM

## 2025-01-22 DIAGNOSIS — Z93.1 G TUBE FEEDINGS (H): ICD-10-CM

## 2025-01-22 RX ORDER — OXYCODONE HYDROCHLORIDE 5 MG/1
5 TABLET ORAL EVERY 6 HOURS PRN
COMMUNITY

## 2025-01-22 RX ORDER — METHOCARBAMOL 500 MG/1
500 TABLET, FILM COATED ORAL 4 TIMES DAILY PRN
COMMUNITY

## 2025-01-22 RX ORDER — AMOXICILLIN 250 MG
1 CAPSULE ORAL 2 TIMES DAILY PRN
COMMUNITY

## 2025-01-22 RX ORDER — POLYETHYLENE GLYCOL 3350 17 G/17G
1 POWDER, FOR SOLUTION ORAL DAILY PRN
COMMUNITY

## 2025-01-22 NOTE — LETTER
1/22/2025      Roya Burgos  C/o Miles Burgos  [address]  [place] [address]        [facility] G[facility]ATR[facility]    PRIMARY CARE PROVIDER AND CLINIC:  CINDY Hodges [facility], [address] / [place] [address]  Chief Complaint   Patient presents with     Hospital F/U      [place] Medical Record Number:  [id number]  Place of Service where encounter took place:  [facility] () [[id number]]    Roya Burgos  is a 69 year old  (1955), admitted to the above facility from  [facility] . Hospital stay 1/16/2025 through 1/20/2025..       Brief Hospital Course: Roya underwent a cranioplasty on 1/16/25. The procedure and recovery were uneventful. Pain was well managed with oral medications. She was discharged back to LTC.     Today she is laying comfortably in bed. States that her pain medication regiment is sufficient. When the pain medication wears off she endorses a headache and pressure behind her left eye. Her incision appears healthy, no s/s of infection. She reports bowels to be moving, denies urinary symptoms. She is eating well. Mood is stable. Sleeping okay. Denies shortness of breath, chest pain, palpitations, dizziness, lightheadedness, vision changes. Bps well controlled. Blood glucose 100-190. Weight stable.    CODE STATUS/ADVANCE DIRECTIVES DISCUSSION:  Prior  CPR/Full code   ALLERGIES: No Known Allergies   PAST MEDICAL HISTORY:   Past Medical History:   Diagnosis Date     Diabetes mellitus, type II, insulin dependent (H)      HTN (hypertension)      Major depression       PAST SURGICAL HISTORY:   has a past surgical history that includes Ankle surgery; IR Lower Extremity Angiogram Left (4/6/2016); and IR Lower Extremity Angiogram Left (2/3/2016).  FAMILY HISTORY: family history includes Asthma in her mother; Cancer - colorectal (age of onset: 52) in her sister; Diabetes in her brother, sister, and sister.  SOCIAL HISTORY:   reports that she  has quit smoking. Her smoking use included cigarettes. She does not have any smokeless tobacco history on file. She reports that she does not drink alcohol and does not use drugs.  Patient's living condition: lives in a skilled nursing facility    Current Outpatient Medications   Medication Sig Dispense Refill     acetaminophen (TYLENOL) 500 MG tablet Take 1,000 mg by mouth 3 times daily.       albuterol (PROAIR HFA/PROVENTIL HFA/VENTOLIN HFA) 108 (90 Base) MCG/ACT inhaler Inhale 2 puffs into the lungs every 4 hours as needed for shortness of breath, wheezing or cough 18 g 0     amitriptyline (ELAVIL) 10 MG tablet Take 10 mg by mouth at bedtime.       atorvastatin (LIPITOR) 40 MG tablet Take 40 mg by mouth daily.       digoxin (LANOXIN) 125 MCG tablet Take 125 mcg by mouth daily.       empagliflozin (JARDIANCE) 10 MG TABS tablet Take 10 mg by mouth daily.       melatonin 3 MG tablet Take 3 mg by mouth at bedtime.       methocarbamol (ROBAXIN) 500 MG tablet Take 500 mg by mouth 4 times daily as needed for muscle spasms.       mirtazapine (REMERON) 30 MG tablet Take 30 mg by mouth at bedtime.       mometasone-formoterol (DULERA) 100-5 MCG/ACT inhaler Inhale 2 puffs into the lungs 2 times daily.       oxyCODONE (ROXICODONE) 5 MG tablet Take 5 mg by mouth every 6 hours as needed for severe pain.       polyethylene glycol (MIRALAX) 17 g packet Take 1 packet by mouth daily as needed for constipation.       senna-docusate (SENOKOT-S/PERICOLACE) 8.6-50 MG tablet Take 1 tablet by mouth 2 times daily as needed for constipation.       No current facility-administered medications for this visit.      ROS:  Patient denies fever, chills, lightheadedness, dizziness, rhinorrhea, cough, congestion, shortness of breath, chest pain, palpitations, abdominal pain, n/v, diarrhea, constipation, change in appetite, change in sleep pattern, dysuria, frequency, burning or pain with urination.  Other than stated in HPI all other review of  systems is negative.      Vitals:  Vital signs:/66   Pulse 77   Temp 98.2  F (36.8  C)   Resp 17   Wt 82 kg (180 lb 12.8 oz)   SpO2 96%   BMI 32.03 kg/m     GENERAL APPEARANCE: Well developed, well nourished, in no acute distress.  LUNGS: Lung sounds CTA, no adventitious sounds, respiratory effort normal.  CARD: irregular rhythm per afib. Regular rate  ABD: Soft, nondistended and nontender with normal bowel sounds.   MSK: Muscle strength and tone were equal bilaterally. Moves all extremities easily and intentionally.   EXTREMITIES: No cyanosis, clubbing or edema.  NEURO: Alert and oriented x 3. Normal affect. Sensation to touch was normal. Face is symmetric.  SKIN: Incision clean dry and intact  PSYCH: euthymic     Lab/Diagnostic data:  Labs reviewed in EPIC by me including BMP and CBC    ASSESSMENT/PLAN:  Cranioplasty  Hx of CVA  Left MCA distribution. S/p decompressive hemicraniectomy due to cerebral edema, and hemorrhagic transformation.  Right sided hemiplegia. Continue statin.  Cranioplasty 1/16/25  Pain management includes tylenol, methocarbamol PRN, oxycodone PRN. Discussed that we will attempt to wean at her next visit early next week.   Neurosurgery Follow-Up: 2 Weeks for Wound Evaluation and Suture/Staple Removal and 6 weeks post-op follow up with MD     Type II Diabetes with current long term use insulin  CKD stage 3a  Blood sugars .   Lantus Dc'd during hosptial stay  Continue Jardiance 10 mg daily. Last A1c 7/16/24 7.4%.   Recommend A1c at next visit  Creat 1.3 1/17/25    Normocytic anemia  Stable.  Hbg 1/16/25 11.3  Recommend obtaining iron studies at next visit     G tube feeding  Stable Dietician follows for nutritional and hydration needs.   Adult Formula bolus feeding TID. Weights 176lbs- 188 lbs. Continue to trend weights    Afib  Not on anticoagulation given brain bleeding.   On digoxin for rate control (HR 70-80).   7/24/24 Dig level 1.0.      HTN  Stable post op. Well  controlled not on BP meds. Bps 100s- 130s/ 70s    Orders:  NNO    >45 minutes spent assessing patient, providing education, reviewing records from recent hospitalization at [facility], collaborating with nursing, developing plan of care.     Electronically signed by:  CINDY Hodges CNP on 1/22/2025 at 8:53 PM                     Sincerely,        CINDY Hodges CNP    Electronically signed

## 2025-01-22 NOTE — PROGRESS NOTES
[facility] GERIATRICS    PRIMARY CARE PROVIDER AND CLINIC:  CINDY Hodges CNP, [address] / [place] [address]  Chief Complaint   Patient presents with    Hospital /U      [facility] Medical Record Number:  [id number]  Place of Service where encounter took place:  [facility] () [[id number]]    Roya Burgos  is a 69 year old  (1955), admitted to the above facility from  [facility] . Hospital stay 1/16/2025 through 1/20/2025..       Brief Hospital Course: Roya underwent a cranioplasty on 1/16/25. The procedure and recovery were uneventful. Pain was well managed with oral medications. She was discharged back to LTC.     Today she is laying comfortably in bed. States that her pain medication regiment is sufficient. When the pain medication wears off she endorses a headache and pressure behind her left eye. Her incision appears healthy, no s/s of infection. She reports bowels to be moving, denies urinary symptoms. She is eating well. Mood is stable. Sleeping okay. Denies shortness of breath, chest pain, palpitations, dizziness, lightheadedness, vision changes. Bps well controlled. Blood glucose 100-190. Weight stable.    CODE STATUS/ADVANCE DIRECTIVES DISCUSSION:  Prior  CPR/Full code   ALLERGIES: No Known Allergies   PAST MEDICAL HISTORY:   Past Medical History:   Diagnosis Date    Diabetes mellitus, type II, insulin dependent (H)     HTN (hypertension)     Major depression       PAST SURGICAL HISTORY:   has a past surgical history that includes Ankle surgery; IR Lower Extremity Angiogram Left (4/6/2016); and IR Lower Extremity Angiogram Left (2/3/2016).  FAMILY HISTORY: family history includes Asthma in her mother; Cancer - colorectal (age of onset: 52) in her sister; Diabetes in her brother, sister, and sister.  SOCIAL HISTORY:   reports that she has quit smoking. Her smoking use included cigarettes. She does not have any smokeless tobacco history on file.  She reports that she does not drink alcohol and does not use drugs.  Patient's living condition: lives in a skilled nursing facility    Current Outpatient Medications   Medication Sig Dispense Refill    acetaminophen (TYLENOL) 500 MG tablet Take 1,000 mg by mouth 3 times daily.      albuterol (PROAIR HFA/PROVENTIL HFA/VENTOLIN HFA) 108 (90 Base) MCG/ACT inhaler Inhale 2 puffs into the lungs every 4 hours as needed for shortness of breath, wheezing or cough 18 g 0    amitriptyline (ELAVIL) 10 MG tablet Take 10 mg by mouth at bedtime.      atorvastatin (LIPITOR) 40 MG tablet Take 40 mg by mouth daily.      digoxin (LANOXIN) 125 MCG tablet Take 125 mcg by mouth daily.      empagliflozin (JARDIANCE) 10 MG TABS tablet Take 10 mg by mouth daily.      melatonin 3 MG tablet Take 3 mg by mouth at bedtime.      methocarbamol (ROBAXIN) 500 MG tablet Take 500 mg by mouth 4 times daily as needed for muscle spasms.      mirtazapine (REMERON) 30 MG tablet Take 30 mg by mouth at bedtime.      mometasone-formoterol (DULERA) 100-5 MCG/ACT inhaler Inhale 2 puffs into the lungs 2 times daily.      oxyCODONE (ROXICODONE) 5 MG tablet Take 5 mg by mouth every 6 hours as needed for severe pain.      polyethylene glycol (MIRALAX) 17 g packet Take 1 packet by mouth daily as needed for constipation.      senna-docusate (SENOKOT-S/PERICOLACE) 8.6-50 MG tablet Take 1 tablet by mouth 2 times daily as needed for constipation.       No current facility-administered medications for this visit.      ROS:  Patient denies fever, chills, lightheadedness, dizziness, rhinorrhea, cough, congestion, shortness of breath, chest pain, palpitations, abdominal pain, n/v, diarrhea, constipation, change in appetite, change in sleep pattern, dysuria, frequency, burning or pain with urination.  Other than stated in HPI all other review of systems is negative.      Vitals:  Vital signs:/66   Pulse 77   Temp 98.2  F (36.8  C)   Resp 17   Wt 82 kg (180 lb  12.8 oz)   SpO2 96%   BMI 32.03 kg/m     GENERAL APPEARANCE: Well developed, well nourished, in no acute distress.  LUNGS: Lung sounds CTA, no adventitious sounds, respiratory effort normal.  CARD: irregular rhythm per afib. Regular rate  ABD: Soft, nondistended and nontender with normal bowel sounds.   MSK: Muscle strength and tone were equal bilaterally. Moves all extremities easily and intentionally.   EXTREMITIES: No cyanosis, clubbing or edema.  NEURO: Alert and oriented x 3. Normal affect. Sensation to touch was normal. Face is symmetric.  SKIN: Incision clean dry and intact  PSYCH: euthymic     Lab/Diagnostic data:  Labs reviewed in EPIC by me including BMP and CBC    ASSESSMENT/PLAN:  Cranioplasty  Hx of CVA  Left MCA distribution. S/p decompressive hemicraniectomy due to cerebral edema, and hemorrhagic transformation.  Right sided hemiplegia. Continue statin.  Cranioplasty 1/16/25  Pain management includes tylenol, methocarbamol PRN, oxycodone PRN. Discussed that we will attempt to wean at her next visit early next week.   Neurosurgery Follow-Up: 2 Weeks for Wound Evaluation and Suture/Staple Removal and 6 weeks post-op follow up with MD     Type II Diabetes with current long term use insulin  CKD stage 3a  Blood sugars .   Lantus Dc'd during hosptial stay  Continue Jardiance 10 mg daily. Last A1c 7/16/24 7.4%.   Recommend A1c at next visit  Creat 1.3 1/17/25    Normocytic anemia  Stable.  Hbg 1/16/25 11.3  Recommend obtaining iron studies at next visit     G tube feeding  Stable Dietician follows for nutritional and hydration needs.   Adult Formula bolus feeding TID. Weights 176lbs- 188 lbs. Continue to trend weights    Afib  Not on anticoagulation given brain bleeding.   On digoxin for rate control (HR 70-80).   7/24/24 Dig level 1.0.      HTN  Stable post op. Well controlled not on BP meds. Bps 100s- 130s/ 70s    Orders:  NNO    >45 minutes spent assessing patient, providing education, reviewing  records from recent hospitalization at [facility], collaborating with nursing, developing plan of care.     Electronically signed by:  CINDY Hodges CNP on 1/22/2025 at 8:53 PM

## 2025-01-23 NOTE — PROGRESS NOTES
TRANSITIONS OF CARE (NEELIMA) LOG    NEELIMA tasks should be completed by the CC within one (1) business day of notification of each transition. Follow up contact with member is required after return to their usual care setting.  Note:  If CC finds out about the transitions fifteen (15) days or more after the member has returned to their usual care setting, no NEELIMA log is needed. However, the CC should check in with the member to discuss the transition process, any changes needed to the care plan and document it in a case note.     Member Name:  Roya Burgos [facility] Name:  [employer] Member ID#: [id number]   Product: [facility] Care Coordinator Contact:  CHHAYA Cox, [facility] Agency/County/Care System: [facility]   Transition Communication Actions from Care Management Contact   Transition #1   Notification Date: 1/21/25 Transition Date:   1/16/25 Transition From: Nursing Home, [facility]     Is this the member s usual care setting?               yes Transition To: Hospital,     Transition Type:  Planned    Documentation from conversation with the member/responsible party, provider, discharging and receiving facility:   Date: 1/21/25: Received notification of transition to  for cranioplasty, by time CC received notification Roya discharged to home.  CC contacted adult daughter Stephanie Burgos ([phone])   and reviewed discharge summary.  Member has a follow-up appointment with PCP in 7 days: Yes: will be followed by facility on-site care team.   Member has had a change in condition: No  Home visit needed: No  Support Plan reviewed and updated.  The following home based services None were resumed.  New referrals placed: No  Transition log completed.   PCP, Anny Tyler, notified of transition back to home via EMR.    CHHAYA Cox, [facility]  [phone]       Transition #2   Notification Date: 1/21/25 Transition Date:    1/20/25 Transition From: Hospital, [facility]      Is this the member s usual care setting?               no Transition To: Nursing Home, [facility]     See above note as Roya already discharged back to [facility] nursing home by time CC notified.    CHHAYA Cox, [facility]   [place]  [phone]         *RETURN TO USUAL CARE SETTING: *Complete tasks below when the member is discharging TO their usual care setting within one (1) business day of notification..      For situations where the Care Coordinator is notified of the discharge prior to the date of discharge, the Care Coordinator must follow up with the member or designated representative to confirm that discharge actually occurred and discuss required NEELIMA tasks as outlined in the NEELIMA Instructions.  (This includes situations where it may be a  new  usual care setting for the member. (i.e., a community member who decides upon permanent nursing home placement following hospitalization and rehab).    Discuss with Member/Responsible Party:    Check  Yes  - if the member, family member and/or SNF/facility staff manages the following:    If  No  provide explanation in the comments section.          Date completed: 1/21/25 Communicated with member or their designated representative about the following:  care transition process; about changes to the member s health status; plan of care updates; education about transitions and how to prevent unplanned transitions/readmissions    Four Pillars for Optimal Transition:    Check  Yes  - if the member, family member and/or SNF/facility staff manages the following:    If  No  provide explanation in the comments section.          [x]  Yes     []  No Does the member have a follow-up appointment scheduled with primary care or specialist? (Mental health hospitalizations--the appt. should be w/in 7 days)              For mental health hospitalizations:  []  Yes     []  No     Does the member have a  follow-up appointment scheduled with a mental health practitioner within 7 days of discharge?  [x]  Yes     []  No     Has a medication review been completed with member? If no, refer to PCP, home care nurse, MTM, pharmacist  [x]  Yes     []  No     Can the member manage their medications or is there a system in place to manage medications (e.g. home care set-up)?         [x]  Yes     []  No     Can the member verbalize warning signs and symptoms to watch for and how to respond?  [x]  Yes     []  No     Does the member have a copy of and understand their discharge instructions?  If no, assist to obtain copy of discharge instructions, review discharge instructions, and assist to contact PCP to discuss questions about their recent hospitalization.  [x]  Yes     []  No     Does the member have adequate food, housing and transportation?  If no, add goal and discuss additional supports available to the member                                                                                                                                                                                 [x]  Yes     []  No     Is the member safe in their home?  If no, document needs and support provided                                                                                                                                                                          []  Yes     [x]  No     Are there any concerns of vulnerability, abuse, or neglect?  If yes, document concerns and actions taken by Care Coordinator as a mandated                                                                                                                                                                              [x]  Yes     []  No     Does the member use a Personal Health Care Record?  Check  Yes  if visit summary, discharge summary, and/or healthcare summary are being used as a PHR.                                                                                                                                                                                   [x]  Yes     []  No     Have you reviewed the discharge summary with the member? If  No  provide explanation in comments.  [x]  Yes     []  No     Have you updated the member s care plan/support plan? Add new diagnosis, medications, treatments, goals & interventions, as applicable. If No, provide explanation in comments.    Comments:           CHHAYA Cox, [facility]  [phone]             done

## 2025-01-27 ENCOUNTER — NURSING HOME VISIT (OUTPATIENT)
Dept: GERIATRICS | Facility: CLINIC | Age: 70
End: 2025-01-27
Payer: COMMERCIAL

## 2025-01-27 VITALS
TEMPERATURE: 98.2 F | WEIGHT: 179.2 LBS | SYSTOLIC BLOOD PRESSURE: 128 MMHG | HEART RATE: 77 BPM | RESPIRATION RATE: 18 BRPM | HEIGHT: 67 IN | DIASTOLIC BLOOD PRESSURE: 66 MMHG | OXYGEN SATURATION: 96 % | BODY MASS INDEX: 28.12 KG/M2

## 2025-01-27 DIAGNOSIS — D64.9 NORMOCYTIC ANEMIA: ICD-10-CM

## 2025-01-27 DIAGNOSIS — Z79.4 TYPE 2 DIABETES MELLITUS WITH STAGE 3 CHRONIC KIDNEY DISEASE, WITH LONG-TERM CURRENT USE OF INSULIN, UNSPECIFIED WHETHER STAGE 3A OR 3B CKD (H): ICD-10-CM

## 2025-01-27 DIAGNOSIS — Z98.890 HISTORY OF CRANIOPLASTY: Primary | ICD-10-CM

## 2025-01-27 DIAGNOSIS — Z86.73 HISTORY OF CVA (CEREBROVASCULAR ACCIDENT): ICD-10-CM

## 2025-01-27 DIAGNOSIS — I48.0 PAROXYSMAL ATRIAL FIBRILLATION (H): ICD-10-CM

## 2025-01-27 DIAGNOSIS — E11.22 TYPE 2 DIABETES MELLITUS WITH STAGE 3 CHRONIC KIDNEY DISEASE, WITH LONG-TERM CURRENT USE OF INSULIN, UNSPECIFIED WHETHER STAGE 3A OR 3B CKD (H): ICD-10-CM

## 2025-01-27 DIAGNOSIS — I10 ESSENTIAL HYPERTENSION: ICD-10-CM

## 2025-01-27 DIAGNOSIS — N18.32 CHRONIC KIDNEY DISEASE, STAGE 3B (H): ICD-10-CM

## 2025-01-27 DIAGNOSIS — N18.30 TYPE 2 DIABETES MELLITUS WITH STAGE 3 CHRONIC KIDNEY DISEASE, WITH LONG-TERM CURRENT USE OF INSULIN, UNSPECIFIED WHETHER STAGE 3A OR 3B CKD (H): ICD-10-CM

## 2025-01-27 DIAGNOSIS — I69.351 FLACCID HEMIPLEGIA OF RIGHT DOMINANT SIDE AS LATE EFFECT OF CEREBRAL INFARCTION (H): ICD-10-CM

## 2025-01-27 DIAGNOSIS — N18.31 STAGE 3A CHRONIC KIDNEY DISEASE (H): ICD-10-CM

## 2025-01-27 DIAGNOSIS — E66.01 OBESITY, MORBID (H): ICD-10-CM

## 2025-01-27 DIAGNOSIS — Z93.1 G TUBE FEEDINGS (H): ICD-10-CM

## 2025-01-27 PROCEDURE — 99309 SBSQ NF CARE MODERATE MDM 30: CPT

## 2025-01-27 NOTE — LETTER
1/27/2025      Roya Burgos  C/o Miles Burgos  [address]  [place] [address]        [facility] GERIATRICS    PRIMARY CARE PROVIDER AND CLINIC:  CNIDY Hodges [facility], [address] / [place] [address]  Chief Complaint   Patient presents with     RECHECK      [facility] Medical Record Number:  [id number]  [facility] encounter [facility] () [[id number]]    Roya Burgos  is a 69 year old  (1955), admitted to the above facility from  [facility] . Hospital stay 1/16/2025 through 1/20/2025..       Brief Hospital Course: Roay underwent a cranioplasty on 1/16/25. The procedure and recovery were uneventful. Pain was well managed with oral medications. She was discharged back to LT.     Today she is lying in bed watching TV when I arrive. She continues to have a dull headache which she finds tylenol to be most helpful with treating. She is using oxycodone ~1-2 times daily. Discussed that we will attempt to wean from this entirely in the next week. She is okay with this. Blood sugars stable 1teens-200. Will not adjust regiment today. Sleeping well. Appetite stable. Mood stable. Having daily bowel movements. Denies urinary symptoms including dysuria or hematuria. Denies shortness of breath, chest pain, palpitations, dizziness, lightheadedness.     CODE STATUS/ADVANCE DIRECTIVES DISCUSSION:  Prior  CPR/Full code   ALLERGIES: No Known Allergies   PAST MEDICAL HISTORY:   Past Medical History:   Diagnosis Date     Diabetes mellitus, type II, insulin dependent (H)      HTN (hypertension)      Major depression       Current Outpatient Medications   Medication Sig Dispense Refill     acetaminophen (TYLENOL) 500 MG tablet Take 1,000 mg by mouth 3 times daily.       albuterol (PROAIR HFA/PROVENTIL HFA/VENTOLIN HFA) 108 (90 Base) MCG/ACT inhaler Inhale 2 puffs into the lungs every 4 hours as needed for shortness of breath, wheezing or  "cough 18 g 0     amitriptyline (ELAVIL) 10 MG tablet Take 10 mg by mouth at bedtime.       atorvastatin (LIPITOR) 40 MG tablet Take 40 mg by mouth daily.       digoxin (LANOXIN) 125 MCG tablet Take 125 mcg by mouth daily.       empagliflozin (JARDIANCE) 10 MG TABS tablet Take 10 mg by mouth daily.       melatonin 3 MG tablet Take 3 mg by mouth at bedtime.       methocarbamol (ROBAXIN) 500 MG tablet Take 500 mg by mouth 4 times daily as needed for muscle spasms.       mirtazapine (REMERON) 30 MG tablet Take 30 mg by mouth at bedtime.       mometasone-formoterol (DULERA) 100-5 MCG/ACT inhaler Inhale 2 puffs into the lungs 2 times daily.       oxyCODONE (ROXICODONE) 5 MG tablet Take 5 mg by mouth every 6 hours as needed for severe pain.       polyethylene glycol (MIRALAX) 17 g packet Take 1 packet by mouth daily as needed for constipation.       senna-docusate (SENOKOT-S/PERICOLACE) 8.6-50 MG tablet Take 1 tablet by mouth 2 times daily as needed for constipation.       No current facility-administered medications for this visit.      ROS:  Patient denies fever, chills, lightheadedness, dizziness, rhinorrhea, cough, congestion, shortness of breath, chest pain, palpitations, abdominal pain, n/v, diarrhea, constipation, change in appetite, change in sleep pattern, dysuria, frequency, burning or pain with urination.  Other than stated in HPI all other review of systems is negative.      Vital signs:/66   Pulse 77   Temp 98.2  F (36.8  C)   Resp 18   Ht 1.702 m (5' 7\")   Wt 81.3 kg (179 lb 3.2 oz)   SpO2 96%   BMI 28.07 kg/m     GENERAL APPEARANCE: Well developed, well nourished, in no acute distress.  LUNGS: Lung sounds CTA  CARD: RRR  ABD: Soft, nondistended and nontender with normal bowel sounds. G tube in place   MSK: Right sided weakness (baseline)  EXTREMITIES: No cyanosis, clubbing or edema.  NEURO: Alert and oriented x 3. Normal affect. Baseline right sided weakness post stroke   SKIN: Incision on her " head is clean dry and intact without s/s of infection  PSYCH: euthymic      Lab/Diagnostic data:  Labs reviewed in EPIC by me including CBC and BMP    ASSESSMENT/PLAN:  Cranioplasty  Hx of CVA  Left MCA distribution. S/p decompressive hemicraniectomy due to cerebral edema, and hemorrhagic transformation.  Right sided hemiplegia. Continue statin.  Cranioplasty 1/16/25  Pain management includes tylenol, methocarbamol PRN, oxycodone PRN. She is only using oxy 1-2 times per day. Discussed discontinuing early next week.   Neurosurgery Follow-Up: 2 Weeks for Wound Evaluation and Suture/Staple Removal and 6 weeks post-op follow up with MD     Type II Diabetes with current long term use insulin  CKD stage 3a  Blood sugars 100-200.   Lantus Dc'd during hosptial stay  Continue Jardiance 10 mg daily. Last A1c 7/16/24 7.4%.   Hgb A1c due 1/29/25  Creat 1.3 1/17/25    Normocytic anemia  Stable.  Hbg 1/16/25 11.3  Iron studies and CBC due 1/29/25    G tube feeding  Morbid obesity  Stable Dietician follows for nutritional and hydration needs.   Adult Formula bolus feeding TID. Weights 176lbs- 188 lbs. Continue to trend weights  Today 179 lbs    Afib  Flaccid hemiplegia as late effect of cerebrovascular disease   Not on anticoagulation given brain bleeding.   On digoxin for rate control (HR 70-80).   7/24/24 Dig level 1.0.   No changes today     HTN  Stable post op. Well controlled not on BP meds. Bps 100s- 130s/ 70s    Orders:  Iron studies and CBC due 1/29/25  Hgb A1c due 1/29/25      Electronically signed by:CINDY Hodges CNP on 1/28/2025 at 9:01 AM                   Sincerely,        CINDY Hodges CNP    Electronically signed

## 2025-01-28 ENCOUNTER — LAB REQUISITION (OUTPATIENT)
Dept: LAB | Facility: CLINIC | Age: 70
End: 2025-01-28
Payer: COMMERCIAL

## 2025-01-28 DIAGNOSIS — E11.42 TYPE 2 DIABETES MELLITUS WITH DIABETIC POLYNEUROPATHY (H): ICD-10-CM

## 2025-01-28 DIAGNOSIS — D64.9 ANEMIA, UNSPECIFIED: ICD-10-CM

## 2025-01-28 DIAGNOSIS — N18.31 CHRONIC KIDNEY DISEASE, STAGE 3A (H): ICD-10-CM

## 2025-01-28 PROBLEM — J96.00 ACUTE RESPIRATORY FAILURE (H): Status: RESOLVED | Noted: 2024-05-24 | Resolved: 2025-01-28

## 2025-01-29 ENCOUNTER — HOSPITAL ENCOUNTER (OUTPATIENT)
Facility: CLINIC | Age: 70
Discharge: HOME | End: 2025-01-29
Admitting: FAMILY MEDICINE
Payer: COMMERCIAL

## 2025-01-29 LAB
ANION GAP SERPL CALCULATED.3IONS-SCNC: 11 MMOL/L (ref 7–15)
ANION GAP SERPL CALCULATED.3IONS-SCNC: 11 MMOL/L (ref 7–15)
BUN SERPL-MCNC: 39 MG/DL (ref 8–23)
BUN SERPL-MCNC: 39 MG/DL (ref 8–23)
CALCIUM SERPL-MCNC: 9.6 MG/DL (ref 8.8–10.4)
CALCIUM SERPL-MCNC: 9.6 MG/DL (ref 8.8–10.4)
CHLORIDE SERPL-SCNC: 103 MMOL/L (ref 98–107)
CHLORIDE SERPL-SCNC: 103 MMOL/L (ref 98–107)
CREAT SERPL-MCNC: 1.4 MG/DL (ref 0.51–0.95)
CREAT SERPL-MCNC: 1.4 MG/DL (ref 0.51–0.95)
EGFRCR SERPLBLD CKD-EPI 2021: 41 ML/MIN/1.73M2
EGFRCR SERPLBLD CKD-EPI 2021: 41 ML/MIN/1.73M2
ERYTHROCYTE [DISTWIDTH] IN BLOOD BY AUTOMATED COUNT: 14.1 % (ref 10–15)
ERYTHROCYTE [DISTWIDTH] IN BLOOD BY AUTOMATED COUNT: 14.1 % (ref 10–15)
EST. AVERAGE GLUCOSE BLD GHB EST-MCNC: 143 MG/DL
EST. AVERAGE GLUCOSE BLD GHB EST-MCNC: 143 MG/DL
FERRITIN SERPL-MCNC: 270 NG/ML (ref 11–328)
FERRITIN SERPL-MCNC: 270 NG/ML (ref 11–328)
GLUCOSE SERPL-MCNC: 137 MG/DL (ref 70–99)
GLUCOSE SERPL-MCNC: 137 MG/DL (ref 70–99)
HBA1C MFR BLD: 6.6 %
HBA1C MFR BLD: 6.6 %
HCO3 SERPL-SCNC: 24 MMOL/L (ref 22–29)
HCO3 SERPL-SCNC: 24 MMOL/L (ref 22–29)
HCT VFR BLD AUTO: 35.1 % (ref 35–47)
HCT VFR BLD AUTO: 35.1 % (ref 35–47)
HGB BLD-MCNC: 10.5 G/DL (ref 11.7–15.7)
HGB BLD-MCNC: 10.5 G/DL (ref 11.7–15.7)
IRON BINDING CAPACITY (ROCHE): 251 UG/DL (ref 240–430)
IRON BINDING CAPACITY (ROCHE): 251 UG/DL (ref 240–430)
IRON SATN MFR SERPL: 12 % (ref 15–46)
IRON SATN MFR SERPL: 12 % (ref 15–46)
IRON SERPL-MCNC: 29 UG/DL (ref 37–145)
IRON SERPL-MCNC: 29 UG/DL (ref 37–145)
MCH RBC QN AUTO: 29.6 PG (ref 26.5–33)
MCH RBC QN AUTO: 29.6 PG (ref 26.5–33)
MCHC RBC AUTO-ENTMCNC: 29.9 G/DL (ref 31.5–36.5)
MCHC RBC AUTO-ENTMCNC: 29.9 G/DL (ref 31.5–36.5)
MCV RBC AUTO: 99 FL (ref 78–100)
MCV RBC AUTO: 99 FL (ref 78–100)
PLATELET # BLD AUTO: 367 10E3/UL (ref 150–450)
PLATELET # BLD AUTO: 367 10E3/UL (ref 150–450)
POTASSIUM SERPL-SCNC: 5.2 MMOL/L (ref 3.4–5.3)
POTASSIUM SERPL-SCNC: 5.2 MMOL/L (ref 3.4–5.3)
RBC # BLD AUTO: 3.55 10E6/UL (ref 3.8–5.2)
RBC # BLD AUTO: 3.55 10E6/UL (ref 3.8–5.2)
SODIUM SERPL-SCNC: 138 MMOL/L (ref 135–145)
SODIUM SERPL-SCNC: 138 MMOL/L (ref 135–145)
WBC # BLD AUTO: 13.4 10E3/UL (ref 4–11)
WBC # BLD AUTO: 13.4 10E3/UL (ref 4–11)

## 2025-01-29 PROCEDURE — 85027 COMPLETE CBC AUTOMATED: CPT | Mod: ORL | Performed by: FAMILY MEDICINE

## 2025-01-29 PROCEDURE — 83540 ASSAY OF IRON: CPT | Mod: ORL | Performed by: FAMILY MEDICINE

## 2025-01-29 PROCEDURE — 80048 BASIC METABOLIC PNL TOTAL CA: CPT | Mod: ORL | Performed by: FAMILY MEDICINE

## 2025-01-29 PROCEDURE — 36415 COLL VENOUS BLD VENIPUNCTURE: CPT | Mod: ORL | Performed by: FAMILY MEDICINE

## 2025-01-29 PROCEDURE — P9603 ONE-WAY ALLOW PRORATED MILES: HCPCS | Mod: ORL | Performed by: FAMILY MEDICINE

## 2025-01-29 PROCEDURE — 83036 HEMOGLOBIN GLYCOSYLATED A1C: CPT | Mod: ORL | Performed by: FAMILY MEDICINE

## 2025-01-29 PROCEDURE — 83550 IRON BINDING TEST: CPT | Mod: ORL | Performed by: FAMILY MEDICINE

## 2025-01-29 PROCEDURE — 82728 ASSAY OF FERRITIN: CPT | Mod: ORL | Performed by: FAMILY MEDICINE

## 2025-02-05 ENCOUNTER — CLINICAL UPDATE (OUTPATIENT)
Dept: PHARMACY | Facility: CLINIC | Age: 70
End: 2025-02-05
Payer: COMMERCIAL

## 2025-02-05 DIAGNOSIS — F32.A DEPRESSION, UNSPECIFIED DEPRESSION TYPE: ICD-10-CM

## 2025-02-05 DIAGNOSIS — J44.9 CHRONIC OBSTRUCTIVE PULMONARY DISEASE, UNSPECIFIED COPD TYPE (H): ICD-10-CM

## 2025-02-05 DIAGNOSIS — G43.909 MIGRAINE: ICD-10-CM

## 2025-02-05 DIAGNOSIS — I48.91 ATRIAL FIBRILLATION WITH RVR (H): ICD-10-CM

## 2025-02-05 DIAGNOSIS — R52 PAIN: ICD-10-CM

## 2025-02-05 DIAGNOSIS — E11.9 TYPE 2 DIABETES MELLITUS (H): Primary | ICD-10-CM

## 2025-02-05 DIAGNOSIS — I50.20 HFREF (HEART FAILURE WITH REDUCED EJECTION FRACTION) (H): ICD-10-CM

## 2025-02-05 PROCEDURE — 99207 PR NO CHARGE LOS: CPT | Performed by: PHARMACIST

## 2025-02-05 NOTE — PROGRESS NOTES
This patient's medication list and chart were reviewed as part of the service provided by [facility].    Assessment/Recommendations:    Noted patient is on amitriptyline for migraine prevention.  Please monitor for side effects such as confusion, sedation, dry mouth, constipation, falls.  Also has potential to increase risk of Qtc prolongation, tachycardia, etc.  Please consider periodic monitoring of EKG/Qtc prolongation, especially with addition of any meds that may prolong Qtc interval and/or if patient experiencing symptoms.  Of note, she does have a history of afib.    Please also ensure periodic monitoring of Scr, K, Mg, Calcium due to digoxin use.  Periodic monitoring of digoxin level recommended as well, especially if any of the following (per UpToDate):  Questionable patient compliance or to evaluate clinical deterioration following an initial good response    Changing kidney function    Suspected digoxin toxicity    Initiation or discontinuation of therapy with drugs (eg, amiodarone, quinidine, verapamil) which potentially interact with digoxin.    Any disease changes (eg, thyroid disease)    Noted last dig level in 7/2024 = 1.0.  Of note, patient's renal function borderline for considering dose reduction to 0.0625mg once daily or 0.125mg every other day.  Continue close monitoring of renal function and consider digoxin level with next labs, sooner if indicated.    Noted NP plan to taper and d'c oxycodone - agree with plan.  In future, may benefit from taper/d'c of methocarbamol due to potential to contribute to confusion, sedation, dizziness, falls, etc.    Please assess if current treatment for COPD appropriate.  Of note, patient is currently on Dulera (ICS/LABA) scheduled and prn albuterol (DAVID).  GOLD guidelines recommend consideration of ICS if history of hospitalization for exacerbation of COPD despite appropriate therapy and adherence (of note, doesn't appear she has  been on LAMA/LABA in past), or if 2 or more moderate exacerbations of COPD per year, or blood eosinophils 300 or greater, or history of or concomitant asthma.  I do not see a history of asthma or blood eosinophils >300, and doesn't appear she has previously been on recommended LABA/LAMA combo therapy when she had exacerbations, so may benefit from trial d'c of Dulera and instead begin LABA/LAMA such as Stiolto Respimat or Anoro Ellipta, and continue with prn albuterol inhaler; monitoring closely for respiratory symptoms. Inhaled corticosteroids may increase risk of pneumonia.    Estimated Creatinine Clearance: 45.7 ml/min (based on Scr of 1.31)        Marilee Lott, Pharm.D.,[place]  Board Certified Geriatric Pharmacist  Medication Therapy Management Pharmacist  [phone]

## 2025-02-11 DIAGNOSIS — R52 PAIN: ICD-10-CM

## 2025-02-11 RX ORDER — OXYCODONE HYDROCHLORIDE 5 MG/1
5 TABLET ORAL DAILY PRN
Qty: 20 TABLET | Refills: 0 | Status: SHIPPED | OUTPATIENT
Start: 2025-02-11 | End: 2025-03-03

## 2025-02-20 ENCOUNTER — NURSING HOME VISIT (OUTPATIENT)
Dept: GERIATRICS | Facility: CLINIC | Age: 70
End: 2025-02-20
Payer: COMMERCIAL

## 2025-02-20 VITALS
HEIGHT: 67 IN | OXYGEN SATURATION: 96 % | WEIGHT: 197.5 LBS | HEART RATE: 60 BPM | SYSTOLIC BLOOD PRESSURE: 125 MMHG | RESPIRATION RATE: 17 BRPM | BODY MASS INDEX: 31 KG/M2 | TEMPERATURE: 98.2 F | DIASTOLIC BLOOD PRESSURE: 75 MMHG

## 2025-02-20 DIAGNOSIS — Z86.73 HISTORY OF CVA (CEREBROVASCULAR ACCIDENT): Primary | ICD-10-CM

## 2025-02-20 DIAGNOSIS — I50.20 HFREF (HEART FAILURE WITH REDUCED EJECTION FRACTION) (H): ICD-10-CM

## 2025-02-20 DIAGNOSIS — Z79.4 TYPE 2 DIABETES MELLITUS WITH STAGE 3 CHRONIC KIDNEY DISEASE, WITH LONG-TERM CURRENT USE OF INSULIN, UNSPECIFIED WHETHER STAGE 3A OR 3B CKD (H): ICD-10-CM

## 2025-02-20 DIAGNOSIS — I10 ESSENTIAL HYPERTENSION: ICD-10-CM

## 2025-02-20 DIAGNOSIS — E11.22 TYPE 2 DIABETES MELLITUS WITH STAGE 3 CHRONIC KIDNEY DISEASE, WITH LONG-TERM CURRENT USE OF INSULIN, UNSPECIFIED WHETHER STAGE 3A OR 3B CKD (H): ICD-10-CM

## 2025-02-20 DIAGNOSIS — N18.30 TYPE 2 DIABETES MELLITUS WITH STAGE 3 CHRONIC KIDNEY DISEASE, WITH LONG-TERM CURRENT USE OF INSULIN, UNSPECIFIED WHETHER STAGE 3A OR 3B CKD (H): ICD-10-CM

## 2025-02-20 DIAGNOSIS — I48.0 PAROXYSMAL ATRIAL FIBRILLATION (H): ICD-10-CM

## 2025-02-20 PROCEDURE — 99309 SBSQ NF CARE MODERATE MDM 30: CPT | Performed by: FAMILY MEDICINE

## 2025-02-20 NOTE — LETTER
2/20/2025      Roya Burgos  C/o Miles Burgos  1994 [facility] [address]  [facility] [address]        No notes on file      Sincerely,        Shari Lerma MD    Electronically signed     Afib  Digoxin levels wnl. Pt w/ afib w/ RVR earlier on 7/17 w/out home meds ordered, otherwise HDS. Will resume home meds  - PTA metoprolol tartrate 100 mg BID, diltiazem 60 mgs Q8h, digoxin 125mcg Daily   - telemetry  - pt not on AC 2/2 hemorrhagic stroke hx     AMS, resolved  Reported to be agitated and not herself. Labs obtained on admission thus far unrevealing (CXR negative, UA neg, digoxin level wnl, CT head neg). VBG w/ no hypercarbia, LA mildly elevated at 2.1, now 1.9 (7/18). Cdiff and enteric panel neg. Covid neg. CRP of 31 on admission and downtrending to 28 on 7/18. Ammonia wnl. Blood cultures pending. On our interview on 7/17, pt is conversant and does not appear altered. It appears that she has returned back to baseline.  -Holding off on antibiotics given lack of evidence of sepsis and lack of clear infectious source  -Neurochecks every 4 hours for the first 48 hours of admission  -Requested a pharmacy consult to review etiologies for acute toxic encephalopathy.              - No changes needed after review of meds.             Recent ischemic left MCA stroke c/b cerebral edema s/p decompressive hemicraniectomy c/b hemorrhagic transformation secondary to therapeutic anticoagulation  Dysarthria   Significant right-sided berhane-paralysis  oropharyngeal dysphagia s/p percutaneous gastrostomy tube on tube feeding  -Will resume aspirin once we ensure there is no active brain bleeding- pt reports not taking, and discharge summaries indicate discontinuation, will attempt verification w/ pharmacy   -Requested dietitian consult to resume tube feeding.  - SPL consulted.              - Initiate pureed (IDDSI 4) diet and thin liquids (7/17) and advanced to IDDSI 5 (7/18) with thin liquids.              - SLP will follow for dysphagia management and trialing advanced solids when pt's dentures are present.  -PTA Atorvastatin.  -The patient will need neurosurgery follow-up in 6 weeks based on documentation from prior  hospital (appears to be scheduled August 2024)     Acute kidney injury, akin stage I on top of CKD stage III, resolved   Cr baseline 1-1.5 (2024) and Cr 1.5 on admission. BUN/Cr ~19, likely prerenal. Now improved on 7/18 with Cr 1.08 and BUN 17.5.  - Strict intake and output and daily body weight  - Resume PTA losartan given stable Cr on 7/19.     Chronic heart failure with mildly reduced ejection fraction LVEF 46%TTE from an outside hospital (6/6/24):  1. Limited echo.  2. Mild LV enlargement with mildly increased wall thickness. Calculated biplane LVEF 46%. Mild global hypokinesis. No LV thrombus.  3. Mild RV enlargement with mildly decreased systolic function. Estimated PASP 25 mmHg + RA Pressure.   -Consider switching metoprolol tartrate to metoprolol succinate prior to discharge for HF   -Consider switching losartan to valsartan once acute kidney injury resolves then eventually initiation of Entresto  - resumed PTA empagliflozin     T2DM, Uncontrolled (7/15/2024 @ 7.4)  Hemoglobin A1c at [facility] was 9.2 on 4/4/2024. Repeat on 7/15 7.4   - resume PTA empagloflozin, lantus 8 units  - Started insulin NovoLog coverage  - Will hold off on metformin in setting of acute kidney injury  - Holding PTA gabapentin 300 mg 3 times daily due to ?sedation on 7/18 AM      Uncontrolled hypertension  - Resume PTA losartan (7/19-xx).  - resume PTA metoprolol as above      COPD not on home oxygen  Breathing comfortably on RA  - resume PTA inhaler (fluticasone vilanterol)  - Bill PRN      Need for placement into a transitional care unit versus long-term care  Daughter Miles on phone and discussed that she is agreeable for placement for patient as she was unable to care for her at home due to her medical needs.   - Case management/social work consulted     Overall stabilized and discharged to TCU on 7/24/2024 for PT, OT, nursing cares, medical management and monitoring.     Following TCU therapies, moved to LTC bed.      Today:  She is seen today for regulatory visit. She was in the hospital Jan 2025 for cranioplasty. No complications. Has appt this week for follow up with neurosurgery. She reports feeling pretty well with no new concerns. No pain. Has right hemiplegia from stroke, baseline. She is non ambulatory. Diabetic on meds, also treated for HTN.       PAST MEDICAL HISTORY:  Past Medical History:   Diagnosis Date     Diabetes mellitus, type II, insulin dependent (H)      HTN (hypertension)      Major depression        MEDICATIONS:  Most accurate list exists at facility.   Current Outpatient Medications   Medication Sig Dispense Refill     acetaminophen (TYLENOL) 500 MG tablet Take 1,000 mg by mouth 3 times daily.       albuterol (PROAIR HFA/PROVENTIL HFA/VENTOLIN HFA) 108 (90 Base) MCG/ACT inhaler Inhale 2 puffs into the lungs every 4 hours as needed for shortness of breath, wheezing or cough 18 g 0     amitriptyline (ELAVIL) 10 MG tablet Take 10 mg by mouth at bedtime.       atorvastatin (LIPITOR) 40 MG tablet Take 40 mg by mouth daily.       baclofen (LIORESAL) 10 MG tablet Take 5 mg by mouth 3 times daily.       cephALEXin (KEFLEX) 500 MG capsule Take 1,000 mg by mouth 3 times daily.       digoxin (LANOXIN) 125 MCG tablet Take 125 mcg by mouth daily.       melatonin 3 MG tablet Take 3 mg by mouth at bedtime.       mirtazapine (REMERON) 30 MG tablet Take 30 mg by mouth at bedtime.       ondansetron (ZOFRAN ODT) 4 MG ODT tab Take 4 mg by mouth every 8 hours as needed for nausea.       polyethylene glycol (MIRALAX) 17 g packet Take 1 packet by mouth daily as needed for constipation.       senna-docusate (SENOKOT-S/PERICOLACE) 8.6-50 MG tablet Take 1 tablet by mouth 2 times daily as needed for constipation.       umeclidinium-vilanterol (ANORO ELLIPTA) 62.5-25 MCG/ACT oral inhaler Inhale 1 puff into the lungs daily.          PHYSICAL EXAM:  General: Patient is alert female, no distress.    Vitals: /75   Pulse 60    "Temp 98.2  F (36.8  C)   Resp 17   Ht 1.702 m (5' 7\")   Wt 89.6 kg (197 lb 8 oz)   SpO2 96%   BMI 30.93 kg/m    HEENT: Head shows cranioplasty scar, healing. Eyes show no injection or icterus. Nares negative. Oropharynx well hydrated.  Neck: No JVD.  Lungs: Non labored respirations.   : Deferred.  Extremities: No edema.  Musculoskeletal: Degen changes. Right hemiparesis.   Skin: Warm and dry.   Psych: Mood appears good.      LABS/DIAGNOSTIC DATA:  Component      Latest Ref Rn 7/19/2024  11:19 AM 7/31/2024  5:30 AM   WBC      4.0 - 11.0 10e3/uL 10.4  11.4 (H)    RBC Count      3.80 - 5.20 10e6/uL 4.13  4.51    Hemoglobin      11.7 - 15.7 g/dL 10.9 (L)  11.6 (L)    Hematocrit      35.0 - 47.0 % 36.7  39.7    MCV      78 - 100 fL 89  88    MCH      26.5 - 33.0 pg 26.4 (L)  25.7 (L)    MCHC      31.5 - 36.5 g/dL 29.7 (L)  29.2 (L)    RDW      10.0 - 15.0 % 16.0 (H)  17.1 (H)    Platelet Count      150 - 450 10e3/uL 340  333       Component      Latest Ref Rn 7/24/2024  4:12 AM 7/31/2024  5:30 AM   Sodium      135 - 145 mmol/L 138  137    Potassium      3.4 - 5.3 mmol/L 4.5  4.5    Carbon Dioxide (CO2)      22 - 29 mmol/L 25  24    Anion Gap      7 - 15 mmol/L 13  13    Urea Nitrogen      8.0 - 23.0 mg/dL 27.9 (H)  31.4 (H)    Creatinine      0.51 - 0.95 mg/dL 1.11 (H)  1.14 (H)    GFR Estimate      >60 mL/min/1.73m2 54 (L)  53 (L)    Calcium      8.8 - 10.4 mg/dL 9.5  9.7    Chloride      98 - 107 mmol/L 100  100         ASSESSMENT/PLAN:  Stroke. Left MCA distribution. S/p decompressive hemicraniectomy due to cerebral edema, and hemorrhagic transformation. Resultant right hemiplegia, dysphagia. Had cranioplasty Jan 2025, no complications. Follow up with neurosurgery this week. Continue statin.   Afib. Not on anticoagulation given brain bleeding. On digoxin for rate control.   HTN. Not on BP meds, continue to monitor.   CHF. Reduced EF. Not on diuretics. Follow up with cardiology.  DM. On empagliflozin, lantus " insulin. Accuchecks followed.   COPD. Respiratory status is stable.   CKD. Labs as noted above.      Electronically signed by: Shari Lerma MD       Sincerely,        Shari Lerma MD    Electronically signed

## 2025-02-25 ENCOUNTER — TELEPHONE (OUTPATIENT)
Dept: GERIATRICS | Facility: CLINIC | Age: 70
End: 2025-02-25
Payer: COMMERCIAL

## 2025-02-26 DIAGNOSIS — R52 PAIN: ICD-10-CM

## 2025-02-26 RX ORDER — OXYCODONE HYDROCHLORIDE 5 MG/1
5 TABLET ORAL DAILY PRN
Qty: 20 TABLET | Refills: 0 | Status: SHIPPED | OUTPATIENT
Start: 2025-02-26

## 2025-02-26 NOTE — TELEPHONE ENCOUNTER
"Patient went to ED today with c/o \"presented to [facility] on 2/25/2025 with acute severe headache and Rt sided pain. Neurosurgery was consulted due to above symptoms\"    She was dx with UTI - tx with IV abx - given prescription for Cephalexin and sent back to facility without further addressing of pain - ED recommending follow-up with Neurology.    Patient previous on Oxycodone with minimal noted use; recently weaned off and discontinued - declining use of Tylenol.    -------  Order:  Patient still with supply of previous Oxycodone on hand - Okay given for one time dose of Oxycodone and to follow-up with PCP tomorrow.    Dr. Darlin Mares, APRN, DNP, AGNP-BC, PMHNP-BC    "

## 2025-02-28 ENCOUNTER — NURSING HOME VISIT (OUTPATIENT)
Dept: GERIATRICS | Facility: CLINIC | Age: 70
End: 2025-02-28
Payer: COMMERCIAL

## 2025-02-28 VITALS
DIASTOLIC BLOOD PRESSURE: 70 MMHG | SYSTOLIC BLOOD PRESSURE: 109 MMHG | OXYGEN SATURATION: 98 % | RESPIRATION RATE: 18 BRPM | HEART RATE: 65 BPM | WEIGHT: 173 LBS | TEMPERATURE: 97.7 F | BODY MASS INDEX: 27.1 KG/M2

## 2025-02-28 DIAGNOSIS — E11.22 TYPE 2 DIABETES MELLITUS WITH STAGE 3B CHRONIC KIDNEY DISEASE, WITHOUT LONG-TERM CURRENT USE OF INSULIN (H): ICD-10-CM

## 2025-02-28 DIAGNOSIS — J44.9 CHRONIC OBSTRUCTIVE PULMONARY DISEASE, UNSPECIFIED COPD TYPE (H): ICD-10-CM

## 2025-02-28 DIAGNOSIS — Z93.1 G TUBE FEEDINGS (H): ICD-10-CM

## 2025-02-28 DIAGNOSIS — E87.1 HYPONATREMIA: ICD-10-CM

## 2025-02-28 DIAGNOSIS — E66.01 OBESITY, MORBID (H): ICD-10-CM

## 2025-02-28 DIAGNOSIS — R25.2 SPASTICITY AS LATE EFFECT OF CEREBROVASCULAR ACCIDENT (CVA): Primary | ICD-10-CM

## 2025-02-28 DIAGNOSIS — Z98.890 HISTORY OF CRANIOPLASTY: ICD-10-CM

## 2025-02-28 DIAGNOSIS — I69.351 FLACCID HEMIPLEGIA OF RIGHT DOMINANT SIDE AS LATE EFFECT OF CEREBRAL INFARCTION (H): ICD-10-CM

## 2025-02-28 DIAGNOSIS — Z86.73 HISTORY OF CVA (CEREBROVASCULAR ACCIDENT): ICD-10-CM

## 2025-02-28 DIAGNOSIS — I10 ESSENTIAL HYPERTENSION: ICD-10-CM

## 2025-02-28 DIAGNOSIS — N30.00 ACUTE CYSTITIS WITHOUT HEMATURIA: ICD-10-CM

## 2025-02-28 DIAGNOSIS — I48.0 PAROXYSMAL ATRIAL FIBRILLATION (H): ICD-10-CM

## 2025-02-28 DIAGNOSIS — I69.398 SPASTICITY AS LATE EFFECT OF CEREBROVASCULAR ACCIDENT (CVA): Primary | ICD-10-CM

## 2025-02-28 DIAGNOSIS — N18.32 TYPE 2 DIABETES MELLITUS WITH STAGE 3B CHRONIC KIDNEY DISEASE, WITHOUT LONG-TERM CURRENT USE OF INSULIN (H): ICD-10-CM

## 2025-02-28 PROCEDURE — 99310 SBSQ NF CARE HIGH MDM 45: CPT

## 2025-02-28 NOTE — LETTER
" 2/28/2025      Roya Burgos  C/o Miles Burgos  1994 [facility] [address]  [facility] [address]        [facility]ATR[facility]    PRIMARY CARE PROVIDER AND CLINIC:  CINDY Hodges [facility], [address] / [place] [address]  Chief Complaint   Patient presents with     RECHECK      [place] Medical Record Number:  [id number]  Place of Service where encounter took place:  [facility] () [[id number]]    Roya Burgos  is a 69 year old  (1955), admitted to the above facility from  [facility] . Hospital stay 1/16/2025 through 1/20/2025..     Pt was recently transferred to the ED for severe right sided pain, fever of 100.8 and 10/10 headache. Was found to have a UTI, treated with ceftriaxone. Discharged back to the facility.     Today she states that she continues to have right sided \"spasms.\" Of note, she has baseline right sided weakness and facial droop following CVA. Reports headache to be resolved. Discussed trialing antispasmodic medication for right sided spasticity. She would like to try this. Sleeping okay. Appetite stable. Mood stable. Having daily bowel movements. Denies urinary symptoms including dysuria or hematuria. Denies shortness of breath, chest pain, palpitations, dizziness, lightheadedness.    CODE STATUS/ADVANCE DIRECTIVES DISCUSSION:  Prior  CPR/Full code   ALLERGIES: No Known Allergies   PAST MEDICAL HISTORY:   Past Medical History:   Diagnosis Date     Diabetes mellitus, type II, insulin dependent (H)      HTN (hypertension)      Major depression       Current Outpatient Medications   Medication Sig Dispense Refill     acetaminophen (TYLENOL) 500 MG tablet Take 1,000 mg by mouth 3 times daily.       albuterol (PROAIR HFA/PROVENTIL HFA/VENTOLIN HFA) 108 (90 Base) MCG/ACT inhaler Inhale 2 puffs into the lungs every 4 hours as needed for shortness of breath, wheezing or cough 18 g 0     amitriptyline (ELAVIL) 10 MG tablet Take 10 mg by mouth at " bedtime.       atorvastatin (LIPITOR) 40 MG tablet Take 40 mg by mouth daily.       baclofen (LIORESAL) 10 MG tablet Take 5 mg by mouth 3 times daily.       cephALEXin (KEFLEX) 500 MG capsule Take 1,000 mg by mouth 3 times daily.       melatonin 3 MG tablet Take 3 mg by mouth at bedtime.       mirtazapine (REMERON) 30 MG tablet Take 30 mg by mouth at bedtime.       ondansetron (ZOFRAN ODT) 4 MG ODT tab Take 4 mg by mouth every 8 hours as needed for nausea.       polyethylene glycol (MIRALAX) 17 g packet Take 1 packet by mouth daily as needed for constipation.       senna-docusate (SENOKOT-S/PERICOLACE) 8.6-50 MG tablet Take 1 tablet by mouth 2 times daily as needed for constipation.       umeclidinium-vilanterol (ANORO ELLIPTA) 62.5-25 MCG/ACT oral inhaler Inhale 1 puff into the lungs daily.       digoxin (LANOXIN) 125 MCG tablet Take 125 mcg by mouth daily.       No current facility-administered medications for this visit.      ROS:  Patient denies fever, chills, headache, lightheadedness, dizziness, rhinorrhea, cough, congestion, shortness of breath, chest pain, palpitations, abdominal pain, n/v, diarrhea, constipation, change in appetite, change in sleep pattern, dysuria, frequency, burning or pain with urination.  Other than stated in HPI all other review of systems is negative.        Vital signs:/70   Pulse 65   Temp 97.7  F (36.5  C)   Resp 18   Wt 78.5 kg (173 lb)   SpO2 98%   BMI 27.10 kg/m     GENERAL APPEARANCE: Well developed, well nourished, in no acute distress.  LUNGS: Lung sounds CTA, no adventitious sounds, respiratory effort normal.  CARD: RRR, S1, S2, without murmurs, gallops, rubs, no JVD, peripheral pulses 2+ and symmetric  ABD: Soft, nondistended and nontender with normal bowel sounds.   MSK: Right sided baseline weakness and right sided facial droop post CVA  EXTREMITIES: No cyanosis, clubbing or edema.  NEURO: Alert and oriented x 3. Normal affect. Sensation to touch was normal.  Face is symmetric..  PSYCH: memory and judgement impaired     Lab/Diagnostic data:  Labs reviewed in EPIC by me including UA,lactate, CMP, CBC    ASSESSMENT/PLAN:  Cranioplasty  Hx of CVA  Right sided spasms  Right sided hemiplegia of right dominant side  Left MCA distribution. S/p decompressive hemicraniectomy due to cerebral edema, and hemorrhagic transformation.  Right sided hemiplegia and right facial droop. Continue statin.  Cranioplasty 1/16/25  Neurosurgery Follow-Up: 6 weeks post-op follow up with MD   Start baclofen 5 mg TID, titrate as tolerated     Type II Diabetes  CKD stage 3b  Blood sugars 100-200.   Continue Jardiance 10 mg daily. Last A1c 1/16/25 6.6  Creat 1.3 2/25/25    Hyponatremia  Na 133 in ED  BMP due 3/3/25    G tube feeding  Morbid obesity  Stable Dietician follows for nutritional and hydration needs.    Adult Formula bolus feeding TID. Weights 170lbs- 175lbs. Continue to trend weights  Today 173 lbs    Afib    Not on anticoagulation given brain bleeding.   On digoxin for rate control (HR 70-80).   Dig levels, BMP, mag due 3/3/25    HTN  Stable. Well controlled not on BP meds. Bps 100s- 130s/ 70s    COPD  No respiratory concerns today  Hx cigarette use  Per pharmacy recs, discontinue dulera, start Anoro Ellipta     UTI  Acute cystitis demonstrated on CT in ED. Continue course of ceftriaxone as ordered in ED    Orders:  Start baclofen  Start anoro ellipta  Dig levels, BMP, mag due 3/3/25    >45 minutes spent assessing patient, providing education to patient, reviewing records from recent ED visit, collaborating with nursing, developing plan of care.     Electronically signed by:CINDY Hodges CNP on 3/1/2025 at 2:28 PM                   Sincerely,        CINDY Hodges CNP    Electronically signed

## 2025-03-01 RX ORDER — BACLOFEN 10 MG/1
5 TABLET ORAL 3 TIMES DAILY
COMMUNITY

## 2025-03-01 RX ORDER — ONDANSETRON 4 MG/1
4 TABLET, ORALLY DISINTEGRATING ORAL EVERY 8 HOURS PRN
COMMUNITY

## 2025-03-01 RX ORDER — CEPHALEXIN 500 MG/1
1000 CAPSULE ORAL 3 TIMES DAILY
COMMUNITY

## 2025-03-01 NOTE — PROGRESS NOTES
"[facility] GERIATRICS    PRIMARY CARE PROVIDER AND CLINIC:  Anny Camargo, CINDY CNP, [address] / [place] [address]  Chief Complaint   Patient presents with    SAIRA      [place] Medical Record Number:  [id number]  Place of Service where encounter took place:  [facility] () [[id number]]    Roya Burgos  is a 69 year old  (1955), admitted to the above facility from  [facility] . Hospital stay 1/16/2025 through 1/20/2025..     Pt was recently transferred to the ED for severe right sided pain, fever of 100.8 and 10/10 headache. Was found to have a UTI, treated with ceftriaxone. Discharged back to the facility.     Today she states that she continues to have right sided \"spasms.\" Of note, she has baseline right sided weakness and facial droop following CVA. Reports headache to be resolved. Discussed trialing antispasmodic medication for right sided spasticity. She would like to try this. Sleeping okay. Appetite stable. Mood stable. Having daily bowel movements. Denies urinary symptoms including dysuria or hematuria. Denies shortness of breath, chest pain, palpitations, dizziness, lightheadedness.    CODE STATUS/ADVANCE DIRECTIVES DISCUSSION:  Prior  CPR/Full code   ALLERGIES: No Known Allergies   PAST MEDICAL HISTORY:   Past Medical History:   Diagnosis Date    Diabetes mellitus, type II, insulin dependent (H)     HTN (hypertension)     Major depression       Current Outpatient Medications   Medication Sig Dispense Refill    acetaminophen (TYLENOL) 500 MG tablet Take 1,000 mg by mouth 3 times daily.      albuterol (PROAIR HFA/PROVENTIL HFA/VENTOLIN HFA) 108 (90 Base) MCG/ACT inhaler Inhale 2 puffs into the lungs every 4 hours as needed for shortness of breath, wheezing or cough 18 g 0    amitriptyline (ELAVIL) 10 MG tablet Take 10 mg by mouth at bedtime.      atorvastatin (LIPITOR) 40 MG tablet Take 40 mg by mouth daily.      baclofen (LIORESAL) 10 MG tablet " Group Therapy Documentation    PATIENT'S NAME: Tamra Jaimes  MRN:   [id number]  :   2004  ACCT. NUMBER: [id number]  DATE OF SERVICE: 3/14/22  START TIME: 10:30 AM  END TIME: 11:30 AM  FACILITATOR(S): Autumn Samuels MA, SHARMILA  TOPIC: Child/Adol Group Therapy  Number of patients attending the group:  5  Group Length:  1 Hours    Psychotherapy Group     Description and therapeutic purpose: Group Therapy is a treatment modality in which a licensed psychotherapist treats clients in a therapeutic group setting using a multitude of interventions  These interventions can include: cognitive behavior therapy (CBT), Dialectical Behavior Therapy (DBT), verbal processing, promoting verbal feedback, and building social/peer relationships within the context of this group, to create therapeutic change.     Patient/Session Objectives:  Patient to actively participate, interacting with peers that have similar issues in a safe, supportive environment.   Patients to discuss their issues and engage with others, both receiving and giving valuable feedback and insight.  Patient to model for peers how to handle life's problems, and conversely observe how others handle problems, thereby learning new healthy coping methods for their behaviors.   Patient to improve perspective taking ability.  Patients to gain better insight regarding their emotions, feelings, thoughts, and behavior patterns allowing them to cope in healthier ways and feel more socially competent and confident.  Patient will learn to communicate more clearly and effectively with peers in the group setting.       Summary of Group / Topics Discussed:    Weekend Check-In. Two Roses (two things that went well); One [place] (one thing that could have gone better)  Continued Discussion: Self Esteem-Self-[place]. Discuss what you see in others that demonstrates a strong sense of self. Using what you know about your cultural background, name things associated with your culture that  "can transfer to positives about yourself.    Group Attendance:  Attended group session    Patient's response to the group topic/interactions:  cooperative with task    Patient appeared to be Attentive and Engaged.       Client specific details:  Tamra responded that things that went well is that she went to the mall, saw a friend. She shared that something that could have gone.better is setting limits with her sister. She shared she has been doing that more and it has been hard for her. She reminded that these boundaries related to her sister using her phone and her money. She was bright in her affect. She was a positive participant dung this check-in. With the  Discussion, she did fairly well. It was difficult at times as she is adopted, however, shared that she feels she can find beauty with her hair related to  culture. Asked her to think about her parents culture and her love for cooking and asked if she can find  positives in that. She shared that she knows how to make some Italian dishes and agreed that some recipes are quite involved however, she  knows how to make these. She  shared a recipe that  is \"like a pancake\" that  she  really enjoys. She  is confident in her cooking abilities.        " Take 5 mg by mouth 3 times daily.      cephALEXin (KEFLEX) 500 MG capsule Take 1,000 mg by mouth 3 times daily.      melatonin 3 MG tablet Take 3 mg by mouth at bedtime.      mirtazapine (REMERON) 30 MG tablet Take 30 mg by mouth at bedtime.      ondansetron (ZOFRAN ODT) 4 MG ODT tab Take 4 mg by mouth every 8 hours as needed for nausea.      polyethylene glycol (MIRALAX) 17 g packet Take 1 packet by mouth daily as needed for constipation.      senna-docusate (SENOKOT-S/PERICOLACE) 8.6-50 MG tablet Take 1 tablet by mouth 2 times daily as needed for constipation.      umeclidinium-vilanterol (ANORO ELLIPTA) 62.5-25 MCG/ACT oral inhaler Inhale 1 puff into the lungs daily.      digoxin (LANOXIN) 125 MCG tablet Take 125 mcg by mouth daily.       No current facility-administered medications for this visit.      ROS:  Patient denies fever, chills, headache, lightheadedness, dizziness, rhinorrhea, cough, congestion, shortness of breath, chest pain, palpitations, abdominal pain, n/v, diarrhea, constipation, change in appetite, change in sleep pattern, dysuria, frequency, burning or pain with urination.  Other than stated in HPI all other review of systems is negative.        Vital signs:/70   Pulse 65   Temp 97.7  F (36.5  C)   Resp 18   Wt 78.5 kg (173 lb)   SpO2 98%   BMI 27.10 kg/m     GENERAL APPEARANCE: Well developed, well nourished, in no acute distress.  LUNGS: Lung sounds CTA, no adventitious sounds, respiratory effort normal.  CARD: RRR, S1, S2, without murmurs, gallops, rubs, no JVD, peripheral pulses 2+ and symmetric  ABD: Soft, nondistended and nontender with normal bowel sounds.   MSK: Right sided baseline weakness and right sided facial droop post CVA  EXTREMITIES: No cyanosis, clubbing or edema.  NEURO: Alert and oriented x 3. Normal affect. Sensation to touch was normal. Face is symmetric..  PSYCH: memory and judgement impaired     Lab/Diagnostic data:  Labs reviewed in EPIC by me including  UA,lactate, CMP, CBC    ASSESSMENT/PLAN:  Cranioplasty  Hx of CVA  Right sided spasms  Right sided hemiplegia of right dominant side  Left MCA distribution. S/p decompressive hemicraniectomy due to cerebral edema, and hemorrhagic transformation.  Right sided hemiplegia and right facial droop. Continue statin.  Cranioplasty 1/16/25  Neurosurgery Follow-Up: 6 weeks post-op follow up with MD   Start baclofen 5 mg TID, titrate as tolerated     Type II Diabetes  CKD stage 3b  Blood sugars 100-200.   Continue Jardiance 10 mg daily. Last A1c 1/16/25 6.6  Creat 1.3 2/25/25    Hyponatremia  Na 133 in ED  BMP due 3/3/25    G tube feeding  Morbid obesity  Stable Dietician follows for nutritional and hydration needs.    Adult Formula bolus feeding TID. Weights 170lbs- 175lbs. Continue to trend weights  Today 173 lbs    Afib    Not on anticoagulation given brain bleeding.   On digoxin for rate control (HR 70-80).   Dig levels, BMP, mag due 3/3/25    HTN  Stable. Well controlled not on BP meds. Bps 100s- 130s/ 70s    COPD  No respiratory concerns today  Hx cigarette use  Per pharmacy recs, discontinue dulera, start Anoro Ellipta     UTI  Acute cystitis demonstrated on CT in ED. Continue course of ceftriaxone as ordered in ED    Orders:  Start baclofen  Start anoro ellipta  Dig levels, BMP, mag due 3/3/25    >45 minutes spent assessing patient, providing education to patient, reviewing records from recent ED visit, collaborating with nursing, developing plan of care.     Electronically signed by:CINDY Hodges CNP on 3/1/2025 at 2:28 PM

## 2025-03-03 NOTE — PROGRESS NOTES
[facility] GERIATRICS  [place] Medical Record Number:  [id number]  Place of Service where encounter took place: [facility] () [[id number]]  CODE STATUS:   CPR/Full code     Chief Complaint/Reason for Visit:  Chief Complaint   Patient presents with    shelter Regulatory     LTC 2/20/2025.        HPI:    Roya Burgos is a 69 year old female with hospitalization July 2024 for stroke as noted below.     [employer]  Hospitalist Discharge Summary    Date of Admission:  7/16/2024  Date of Discharge:  7/24/2024     Hospital Course  Roya Burgos is a 69 year old female admitted on 7/16/2024 for evaluation of AMS. She has PMHx of recent ischemic left MCA stroke c/b cerebral edema s/p decompressive hemicraniectomy c/b hemorrhagic transformation 2/2 therapeutic anticoagulation for atrial fibrillation old complicated by dysarthria and significant right-sided berhane-paralysis, oropharyngeal dysphagia s/p percutaneous gastrostomy tube on tube feeding, chronic migraine headache, paroxysmal atrial fibrillation on aspirin only given brain bleeding, chronic heart failure with mildly reduced ejection fraction, hypertension, type 2 diabetes mellitus, hyperlipidemia, chronic kidney disease stage III A, L parotid gland nodule, Bilateral moderate carotid stenosis, and COPD not on home oxygen.  The patient had frequent hospitalization in [facility] and was recently discharged on 7/14/2024 for care at home with home visiting nurse service, but returned as daughter was unable to care for her at home. Hemodynamically and clinically stable.    Migraine headache  Per chart review, patient has been on percocet (10-325mg TID) since 09/2014, but indications are unclear.  - hold PTA amitriptyline, noted to be sedated 7/18 AM > resume on 7/19  - tylenol PRN     Afib  Digoxin levels wnl. Pt w/ afib w/ RVR earlier on 7/17 w/out home meds ordered, otherwise HDS. Will  resume home meds  - PTA metoprolol tartrate 100 mg BID, diltiazem 60 mgs Q8h, digoxin 125mcg Daily   - telemetry  - pt not on AC 2/2 hemorrhagic stroke hx     AMS, resolved  Reported to be agitated and not herself. Labs obtained on admission thus far unrevealing (CXR negative, UA neg, digoxin level wnl, CT head neg). VBG w/ no hypercarbia, LA mildly elevated at 2.1, now 1.9 (7/18). Cdiff and enteric panel neg. Covid neg. CRP of 31 on admission and downtrending to 28 on 7/18. Ammonia wnl. Blood cultures pending. On our interview on 7/17, pt is conversant and does not appear altered. It appears that she has returned back to baseline.  -Holding off on antibiotics given lack of evidence of sepsis and lack of clear infectious source  -Neurochecks every 4 hours for the first 48 hours of admission  -Requested a pharmacy consult to review etiologies for acute toxic encephalopathy.              - No changes needed after review of meds.             Recent ischemic left MCA stroke c/b cerebral edema s/p decompressive hemicraniectomy c/b hemorrhagic transformation secondary to therapeutic anticoagulation  Dysarthria   Significant right-sided berhane-paralysis  oropharyngeal dysphagia s/p percutaneous gastrostomy tube on tube feeding  -Will resume aspirin once we ensure there is no active brain bleeding- pt reports not taking, and discharge summaries indicate discontinuation, will attempt verification w/ pharmacy   -Requested dietitian consult to resume tube feeding.  - SPL consulted.              - Initiate pureed (IDDSI 4) diet and thin liquids (7/17) and advanced to IDDSI 5 (7/18) with thin liquids.              - SLP will follow for dysphagia management and trialing advanced solids when pt's dentures are present.  -PTA Atorvastatin.  -The patient will need neurosurgery follow-up in 6 weeks based on documentation from prior hospital (appears to be scheduled August 2024)     Acute kidney injury, akin stage I on top of CKD stage  III, resolved   Cr baseline 1-1.5 (2024) and Cr 1.5 on admission. BUN/Cr ~19, likely prerenal. Now improved on 7/18 with Cr 1.08 and BUN 17.5.  - Strict intake and output and daily body weight  - Resume PTA losartan given stable Cr on 7/19.     Chronic heart failure with mildly reduced ejection fraction LVEF 46%TTE from an outside hospital (6/6/24):  1. Limited echo.  2. Mild LV enlargement with mildly increased wall thickness. Calculated biplane LVEF 46%. Mild global hypokinesis. No LV thrombus.  3. Mild RV enlargement with mildly decreased systolic function. Estimated PASP 25 mmHg + RA Pressure.   -Consider switching metoprolol tartrate to metoprolol succinate prior to discharge for HF   -Consider switching losartan to valsartan once acute kidney injury resolves then eventually initiation of Entresto  - resumed PTA empagliflozin     T2DM, Uncontrolled (7/15/2024 @ 7.4)  Hemoglobin A1c at [facility] was 9.2 on 4/4/2024. Repeat on 7/15 7.4   - resume PTA empagloflozin, lantus 8 units  - Started insulin NovoLog coverage  - Will hold off on metformin in setting of acute kidney injury  - Holding PTA gabapentin 300 mg 3 times daily due to ?sedation on 7/18 AM      Uncontrolled hypertension  - Resume PTA losartan (7/19-xx).  - resume PTA metoprolol as above      COPD not on home oxygen  Breathing comfortably on RA  - resume PTA inhaler (fluticasone vilanterol)  - Bill PRN      Need for placement into a transitional care unit versus long-term care  Daughter Miles on phone and discussed that she is agreeable for placement for patient as she was unable to care for her at home due to her medical needs.   - Case management/social work consulted     Overall stabilized and discharged to TCU on 7/24/2024 for PT, OT, nursing cares, medical management and monitoring.     Following TCU therapies, moved to LTC bed.     Today:  She is seen today for regulatory visit. She was in the hospital Jan 2025 for cranioplasty. No  "complications. Has appt this week for follow up with neurosurgery. She reports feeling pretty well with no new concerns. No pain. Has right hemiplegia from stroke, baseline. She is non ambulatory. Diabetic on meds, also treated for HTN.       PAST MEDICAL HISTORY:  Past Medical History:   Diagnosis Date    Diabetes mellitus, type II, insulin dependent (H)     HTN (hypertension)     Major depression        MEDICATIONS:  Most accurate list exists at facility.   Current Outpatient Medications   Medication Sig Dispense Refill    acetaminophen (TYLENOL) 500 MG tablet Take 1,000 mg by mouth 3 times daily.      albuterol (PROAIR HFA/PROVENTIL HFA/VENTOLIN HFA) 108 (90 Base) MCG/ACT inhaler Inhale 2 puffs into the lungs every 4 hours as needed for shortness of breath, wheezing or cough 18 g 0    amitriptyline (ELAVIL) 10 MG tablet Take 10 mg by mouth at bedtime.      atorvastatin (LIPITOR) 40 MG tablet Take 40 mg by mouth daily.      baclofen (LIORESAL) 10 MG tablet Take 5 mg by mouth 3 times daily.      cephALEXin (KEFLEX) 500 MG capsule Take 1,000 mg by mouth 3 times daily.      digoxin (LANOXIN) 125 MCG tablet Take 125 mcg by mouth daily.      melatonin 3 MG tablet Take 3 mg by mouth at bedtime.      mirtazapine (REMERON) 30 MG tablet Take 30 mg by mouth at bedtime.      ondansetron (ZOFRAN ODT) 4 MG ODT tab Take 4 mg by mouth every 8 hours as needed for nausea.      polyethylene glycol (MIRALAX) 17 g packet Take 1 packet by mouth daily as needed for constipation.      senna-docusate (SENOKOT-S/PERICOLACE) 8.6-50 MG tablet Take 1 tablet by mouth 2 times daily as needed for constipation.      umeclidinium-vilanterol (ANORO ELLIPTA) 62.5-25 MCG/ACT oral inhaler Inhale 1 puff into the lungs daily.          PHYSICAL EXAM:  General: Patient is alert female, no distress.    Vitals: /75   Pulse 60   Temp 98.2  F (36.8  C)   Resp 17   Ht 1.702 m (5' 7\")   Wt 89.6 kg (197 lb 8 oz)   SpO2 96%   BMI 30.93 kg/m  "   HEENT: Head shows cranioplasty scar, healing. Eyes show no injection or icterus. Nares negative. Oropharynx well hydrated.  Neck: No JVD.  Lungs: Non labored respirations.   : Deferred.  Extremities: No edema.  Musculoskeletal: Degen changes. Right hemiparesis.   Skin: Warm and dry.   Psych: Mood appears good.      LABS/DIAGNOSTIC DATA:  Component      Latest Ref Rn 7/19/2024  11:19 AM 7/31/2024  5:30 AM   WBC      4.0 - 11.0 10e3/uL 10.4  11.4 (H)    RBC Count      3.80 - 5.20 10e6/uL 4.13  4.51    Hemoglobin      11.7 - 15.7 g/dL 10.9 (L)  11.6 (L)    Hematocrit      35.0 - 47.0 % 36.7  39.7    MCV      78 - 100 fL 89  88    MCH      26.5 - 33.0 pg 26.4 (L)  25.7 (L)    MCHC      31.5 - 36.5 g/dL 29.7 (L)  29.2 (L)    RDW      10.0 - 15.0 % 16.0 (H)  17.1 (H)    Platelet Count      150 - 450 10e3/uL 340  333       Component      Latest Ref [facility] 7/24/2024  4:12 AM 7/31/2024  5:30 AM   Sodium      135 - 145 mmol/L 138  137    Potassium      3.4 - 5.3 mmol/L 4.5  4.5    Carbon Dioxide (CO2)      22 - 29 mmol/L 25  24    Anion Gap      7 - 15 mmol/L 13  13    Urea Nitrogen      8.0 - 23.0 mg/dL 27.9 (H)  31.4 (H)    Creatinine      0.51 - 0.95 mg/dL 1.11 (H)  1.14 (H)    GFR Estimate      >60 mL/min/1.73m2 54 (L)  53 (L)    Calcium      8.8 - 10.4 mg/dL 9.5  9.7    Chloride      98 - 107 mmol/L 100  100         ASSESSMENT/PLAN:  Stroke. Left MCA distribution. S/p decompressive hemicraniectomy due to cerebral edema, and hemorrhagic transformation. Resultant right hemiplegia, dysphagia. Had cranioplasty Jan 2025, no complications. Follow up with neurosurgery this week. Continue statin.   Afib. Not on anticoagulation given brain bleeding. On digoxin for rate control.   HTN. Not on BP meds, continue to monitor.   CHF. Reduced EF. Not on diuretics. Follow up with cardiology.  DM. On empagliflozin, lantus insulin. Accuchecks followed.   COPD. Respiratory status is stable.   CKD. Labs as noted above.      Electronically  signed by: Shari Lerma MD

## 2025-03-04 ENCOUNTER — TELEPHONE (OUTPATIENT)
Dept: GERIATRICS | Facility: CLINIC | Age: 70
End: 2025-03-04
Payer: COMMERCIAL

## 2025-03-04 ENCOUNTER — LAB REQUISITION (OUTPATIENT)
Dept: LAB | Facility: CLINIC | Age: 70
End: 2025-03-04
Payer: COMMERCIAL

## 2025-03-04 DIAGNOSIS — Z48.811 ENCOUNTER FOR SURGICAL AFTERCARE FOLLOWING SURGERY ON THE NERVOUS SYSTEM: ICD-10-CM

## 2025-03-04 NOTE — TELEPHONE ENCOUNTER
"[facility] Geriatrics   2025     Name: Roya Burgos   : 1955     Background:  [facility] Triage Call     Provider: CINDY Stacy CNP  Facility: [facility]  Facility Type:  [facility]     Caller: Dai  Call Back Number: [phone]     Allergies:    Allergies   No Known Allergies      SBAR:   S-(situation): Pt continues to c/o right side pain - from her arm to her leg.      B-(background): Hx of CA with right side weakness. Pt went to the ED on  for the same pain complaints. They found a UTI and antibiotics completed. Baclofen started on  and is on scheduled Tylenol 1000 mg TID.      A-(assessment): No current UTI symptoms. Pt reporting constant pain 10/10 on her whole left side. Nurse unable to clarify if they are spasms or dull pain. Having regular Bms. Vitals: 118/75, 65, 98.3, 97%RA, 17.      R-(recommendations):  Pt requesting \"something stronger\" for her pain     Telephone encounter sent to:  Shari Lerma MD     Please send response/orders to \"Geriatrics Nurse Pool\"     Natasha Collazo RN      Response:  Patient was just seen 2025 with initiation of Baclofen which may take some time to work. Continue current meds, no new orders. Pain management and response best assessed on follow up visit.    Route message for information to Anny Tyler NP.    Electronically signed by Shari Lerma MD      "

## 2025-03-04 NOTE — TELEPHONE ENCOUNTER
"[facility] Triage Call    Provider: CINDY Stacy CNP  Facility: [facility]  Facility Type:  LTC    Caller: Dai  Call Back Number: [phone]    Allergies:  No Known Allergies     SBAR:     S-(situation): Pt continues to c/o right side pain - from her arm to her leg.     B-(background): Hx of CA with right side weakness. Pt went to the ED on 2/25 for the same pain complaints. They found a UTI and antibiotics completed. Baclofen started on 2/28 and is on scheduled Tylenol 1000 mg TID.     A-(assessment): No current UTI symptoms. Pt reporting constant pain 10/10 on her whole left side. Nurse unable to clarify if they are spasms or dull pain. Having regular Bms. Vitals: 118/75, 65, 98.3, 97%RA, 17.     R-(recommendations):  Pt requesting \"something stronger\" for her pain      Telephone encounter sent to:  Shari Lerma MD    Please send response/orders to \"Geriatrics Nurse Pool\"    Natasha Collazo RN      "

## 2025-03-05 ENCOUNTER — TELEPHONE (OUTPATIENT)
Dept: GERIATRICS | Facility: CLINIC | Age: 70
End: 2025-03-05
Payer: COMMERCIAL

## 2025-03-05 DIAGNOSIS — R25.2 SPASTICITY AS LATE EFFECT OF CEREBROVASCULAR ACCIDENT (CVA): Primary | ICD-10-CM

## 2025-03-05 DIAGNOSIS — I69.398 SPASTICITY AS LATE EFFECT OF CEREBROVASCULAR ACCIDENT (CVA): Primary | ICD-10-CM

## 2025-03-05 RX ORDER — OXYCODONE HYDROCHLORIDE 5 MG/1
5 TABLET ORAL DAILY PRN
Qty: 10 TABLET | Refills: 0 | Status: SHIPPED | OUTPATIENT
Start: 2025-03-05

## 2025-03-06 NOTE — TELEPHONE ENCOUNTER
Called re: on-going total R sided pain. No new injury/trauma, no skin issues in the area, vitals are stable. Was started on Baclofen in combination with Tylenol last week in hopes this would help with post stroke pain. RN reports it maybe helps a bit. Had previous Oxycodone / Percocet prescribed over the years; looks like attempted to wean off as was only using 1-2 doses per day in hopes it wouldn't be a long term medication.     Per RN she doesn't specifically seek or ask for a specific medication that he has seen. However tonight if she doesn't have some relief she is going to demand going into ER again. RN went back to talk with her and she does think if she received a dose of Oxycodone it may help until further discussion with primary team. Discussed with nurse to make sure patient aware that on-going conversation will be needed with primary team on best pain control regimen. Note also is on Amitriptyline at night with scheduled Tylenol during the day.       Rx Oxycodone 5 mg once daily PRN pain; only sent in 10 day supply at this time after checking PDMP database.    Lolita Taylor,   [facility]

## 2025-03-07 LAB
ANION GAP SERPL CALCULATED.3IONS-SCNC: 10 MMOL/L (ref 7–15)
BUN SERPL-MCNC: 43 MG/DL (ref 8–23)
CALCIUM SERPL-MCNC: 9.8 MG/DL (ref 8.8–10.4)
CHLORIDE SERPL-SCNC: 104 MMOL/L (ref 98–107)
CREAT SERPL-MCNC: 1.49 MG/DL (ref 0.51–0.95)
DIGOXIN SERPL-MCNC: <0.4 NG/ML (ref 0.6–1.2)
EGFRCR SERPLBLD CKD-EPI 2021: 38 ML/MIN/1.73M2
GLUCOSE SERPL-MCNC: 104 MG/DL (ref 70–99)
HCO3 SERPL-SCNC: 27 MMOL/L (ref 22–29)
MAGNESIUM SERPL-MCNC: 2.6 MG/DL (ref 1.7–2.3)
POTASSIUM SERPL-SCNC: 4.8 MMOL/L (ref 3.4–5.3)
SODIUM SERPL-SCNC: 141 MMOL/L (ref 135–145)

## 2025-03-07 PROCEDURE — 80162 ASSAY OF DIGOXIN TOTAL: CPT | Mod: ORL | Performed by: FAMILY MEDICINE

## 2025-03-07 PROCEDURE — P9604 ONE-WAY ALLOW PRORATED TRIP: HCPCS | Mod: ORL | Performed by: FAMILY MEDICINE

## 2025-03-07 PROCEDURE — 80048 BASIC METABOLIC PNL TOTAL CA: CPT | Mod: ORL | Performed by: FAMILY MEDICINE

## 2025-03-07 PROCEDURE — 36415 COLL VENOUS BLD VENIPUNCTURE: CPT | Mod: ORL | Performed by: FAMILY MEDICINE

## 2025-03-07 PROCEDURE — 83735 ASSAY OF MAGNESIUM: CPT | Mod: ORL | Performed by: FAMILY MEDICINE

## 2025-03-12 ENCOUNTER — NURSING HOME VISIT (OUTPATIENT)
Dept: GERIATRICS | Facility: CLINIC | Age: 70
End: 2025-03-12
Payer: COMMERCIAL

## 2025-03-12 VITALS
TEMPERATURE: 97.2 F | DIASTOLIC BLOOD PRESSURE: 71 MMHG | HEART RATE: 62 BPM | SYSTOLIC BLOOD PRESSURE: 125 MMHG | BODY MASS INDEX: 27.03 KG/M2 | WEIGHT: 172.2 LBS | HEIGHT: 67 IN | OXYGEN SATURATION: 94 % | RESPIRATION RATE: 16 BRPM

## 2025-03-12 DIAGNOSIS — J44.9 CHRONIC OBSTRUCTIVE PULMONARY DISEASE, UNSPECIFIED COPD TYPE (H): ICD-10-CM

## 2025-03-12 DIAGNOSIS — I69.351 FLACCID HEMIPLEGIA OF RIGHT DOMINANT SIDE AS LATE EFFECT OF CEREBRAL INFARCTION (H): ICD-10-CM

## 2025-03-12 DIAGNOSIS — I10 ESSENTIAL HYPERTENSION: ICD-10-CM

## 2025-03-12 DIAGNOSIS — Z93.1 G TUBE FEEDINGS (H): ICD-10-CM

## 2025-03-12 DIAGNOSIS — N18.32 TYPE 2 DIABETES MELLITUS WITH STAGE 3B CHRONIC KIDNEY DISEASE, WITHOUT LONG-TERM CURRENT USE OF INSULIN (H): ICD-10-CM

## 2025-03-12 DIAGNOSIS — I69.398 SPASTICITY AS LATE EFFECT OF CEREBROVASCULAR ACCIDENT (CVA): Primary | ICD-10-CM

## 2025-03-12 DIAGNOSIS — I48.0 PAROXYSMAL ATRIAL FIBRILLATION (H): ICD-10-CM

## 2025-03-12 DIAGNOSIS — R25.2 SPASTICITY AS LATE EFFECT OF CEREBROVASCULAR ACCIDENT (CVA): Primary | ICD-10-CM

## 2025-03-12 DIAGNOSIS — E66.01 OBESITY, MORBID (H): ICD-10-CM

## 2025-03-12 DIAGNOSIS — N18.32 CHRONIC KIDNEY DISEASE, STAGE 3B (H): ICD-10-CM

## 2025-03-12 DIAGNOSIS — E11.22 TYPE 2 DIABETES MELLITUS WITH STAGE 3B CHRONIC KIDNEY DISEASE, WITHOUT LONG-TERM CURRENT USE OF INSULIN (H): ICD-10-CM

## 2025-03-12 DIAGNOSIS — Z86.73 HISTORY OF CVA (CEREBROVASCULAR ACCIDENT): ICD-10-CM

## 2025-03-12 DIAGNOSIS — Z98.890 HISTORY OF CRANIOPLASTY: ICD-10-CM

## 2025-03-12 PROCEDURE — 99309 SBSQ NF CARE MODERATE MDM 30: CPT

## 2025-03-12 NOTE — LETTER
3/12/2025      Roya Burgos  C/o Miles Burgos  [address]  [place] [address]        [facility] GERIATRICS    PRIMARY CARE PROVIDER AND CLINIC:  CINDY Hodges [facility], [address] / [place] [address]  Chief Complaint   Patient presents with     RECHECK      [place] Medical Record Number:  [id number]  Place of Service where encounter took place:  [facility] () [[id number]]    Roya Burgos  is a 69 year old  (1955), admitted to the above facility from  [facility] . Hospital stay 1/16/2025 through 1/20/2025..     Pt was recently transferred to the ED for severe right sided pain, fever of 100.8 and 10/10 headache. Was found to have a UTI, treated with ceftriaxone. Discharged back to the facility.     Today I follow up with Joanna to assess her right sided spasms. Reports this is somewhat better. She has been using her oxycodone once every couple of days. Discussed that I would like to see her off of this medication. She does think the baclofen has helped a little bit. Discussed that we can increase this dose which she would like to do. Sleeping well. Appetite stable. Mood stable. Having daily bowel movements. Denies urinary symptoms including dysuria or hematuria. Denies shortness of breath, chest pain, palpitations, dizziness, lightheadedness.      CODE STATUS/ADVANCE DIRECTIVES DISCUSSION:  Prior  CPR/Full code   ALLERGIES: No Known Allergies   PAST MEDICAL HISTORY:   Past Medical History:   Diagnosis Date     Diabetes mellitus, type II, insulin dependent (H)      HTN (hypertension)      Major depression       Current Outpatient Medications   Medication Sig Dispense Refill     acetaminophen (TYLENOL) 500 MG tablet Take 1,000 mg by mouth 3 times daily.       empagliflozin (JARDIANCE) 10 MG TABS tablet Take 10 mg by mouth daily.       albuterol (PROAIR HFA/PROVENTIL HFA/VENTOLIN HFA) 108 (90 Base) MCG/ACT inhaler Inhale 2 puffs into the lungs  "every 4 hours as needed for shortness of breath, wheezing or cough 18 g 0     amitriptyline (ELAVIL) 10 MG tablet Take 10 mg by mouth at bedtime.       atorvastatin (LIPITOR) 40 MG tablet Take 40 mg by mouth daily.       baclofen (LIORESAL) 10 MG tablet Take 20 mg by mouth 3 times daily.       melatonin 3 MG tablet Take 3 mg by mouth at bedtime.       mirtazapine (REMERON) 30 MG tablet Take 30 mg by mouth at bedtime.       ondansetron (ZOFRAN ODT) 4 MG ODT tab Take 4 mg by mouth every 8 hours as needed for nausea.       oxyCODONE (ROXICODONE) 5 MG tablet Take 1 tablet (5 mg) by mouth daily as needed for severe pain. 10 tablet 0     polyethylene glycol (MIRALAX) 17 g packet Take 1 packet by mouth daily as needed for constipation.       senna-docusate (SENOKOT-S/PERICOLACE) 8.6-50 MG tablet Take 1 tablet by mouth 2 times daily as needed for constipation.       umeclidinium-vilanterol (ANORO ELLIPTA) 62.5-25 MCG/ACT oral inhaler Inhale 1 puff into the lungs daily.       No current facility-administered medications for this visit.      ROS:  Patient denies fever, chills,  lightheadedness, dizziness, rhinorrhea, cough, congestion, shortness of breath, chest pain, palpitations, abdominal pain, n/v, diarrhea, constipation, change in appetite, change in sleep pattern, dysuria, frequency, burning or pain with urination.  Other than stated in HPI all other review of systems is negative.      Vital signs:/71   Pulse 62   Temp 97.2  F (36.2  C)   Resp 16   Ht 1.702 m (5' 7\")   Wt 78.1 kg (172 lb 3.2 oz)   SpO2 94%   BMI 26.97 kg/m     GENERAL APPEARANCE: Well developed, well nourished, in no acute distress.  LUNGS: Lung sounds CTA, no adventitious sounds, respiratory effort normal.  CARD: RRR, systolic murmur  ABD: Soft, nondistended and nontender with normal bowel sounds.   EXTREMITIES: No cyanosis, clubbing or edema.  NEURO: Alert and oriented x 3. Right facial droop. Right sided weakness  PSYCH: memory and " judgement impaired at baseline     Lab/Diagnostic data:  Labs reviewed in EPIC by me including KYLE mcfadden    ASSESSMENT/PLAN:  Cranioplasty  Hx of CVA  Right sided spasms  Right sided hemiplegia of right dominant side  Left MCA distribution. S/p decompressive hemicraniectomy due to cerebral edema, and hemorrhagic transformation.  Right sided hemiplegia and right facial droop.   Continue statin.  Increase baclofen to 20 mg TID- discussed risks and benefits to pt    Type II Diabetes  CKD stage 3b  Blood sugars 100-200.   Continue Jardiance 10 mg daily. Last A1c 1/16/25 6.6  Creat 3/7/25 1.49, eGFR 38  At next visit obtain hgb A1c    G tube feeding  Morbid obesity  Stable Dietician follows for nutritional and hydration needs.   Adult Formula bolus feeding TID. Weights 170lbs- 175lbs. Continue to trend weights  Today 174 lbs    Afib  Not on anticoagulation given brain bleeding.   Digoxin discontinued in hospital  Rates controlled in the 60s    HTN  Stable. Well controlled not on BP meds. Bps 120s- 130s/ 70s    COPD  No respiratory concerns today  Hx cigarette use  Continue Anoro Ellipta     Orders:  Increase baclofen    Electronically signed by:CINDY Hodges CNP on 3/13/2025 at 2:00 PM                 Sincerely,        CINDY Hodges CNP    Electronically signed

## 2025-03-13 ENCOUNTER — PATIENT OUTREACH (OUTPATIENT)
Dept: GERIATRIC MEDICINE | Facility: CLINIC | Age: 70
End: 2025-03-13
Payer: COMMERCIAL

## 2025-03-13 NOTE — PROGRESS NOTES
[facility] Care Coordination Contact    [facility] discharge referral.  Called daughter Marquis who requested to be at discharge visit, left message asking if next week on Tues or Wed at 11am works best for her.      CHHAYA Cox, [facility]  [phone]

## 2025-03-13 NOTE — PROGRESS NOTES
[facility] GERIATRICS    PRIMARY CARE PROVIDER AND CLINIC:  CINDY Hodges CNP, [address] / [place] [address]  Chief Complaint   Patient presents with    SAIRA      [place] Medical Record Number:  [id number]  Place of Service where encounter took place:  [facility] () [[id number]]    Roya Burgos  is a 69 year old  (1955), admitted to the above facility from  [facility] . Hospital stay 1/16/2025 through 1/20/2025..     Pt was recently transferred to the ED for severe right sided pain, fever of 100.8 and 10/10 headache. Was found to have a UTI, treated with ceftriaxone. Discharged back to the facility.     Today I follow up with Joanna to assess her right sided spasms. Reports this is somewhat better. She has been using her oxycodone once every couple of days. Discussed that I would like to see her off of this medication. She does think the baclofen has helped a little bit. Discussed that we can increase this dose which she would like to do. Sleeping well. Appetite stable. Mood stable. Having daily bowel movements. Denies urinary symptoms including dysuria or hematuria. Denies shortness of breath, chest pain, palpitations, dizziness, lightheadedness.      CODE STATUS/ADVANCE DIRECTIVES DISCUSSION:  Prior  CPR/Full code   ALLERGIES: No Known Allergies   PAST MEDICAL HISTORY:   Past Medical History:   Diagnosis Date    Diabetes mellitus, type II, insulin dependent (H)     HTN (hypertension)     Major depression       Current Outpatient Medications   Medication Sig Dispense Refill    acetaminophen (TYLENOL) 500 MG tablet Take 1,000 mg by mouth 3 times daily.      empagliflozin (JARDIANCE) 10 MG TABS tablet Take 10 mg by mouth daily.      albuterol (PROAIR HFA/PROVENTIL HFA/VENTOLIN HFA) 108 (90 Base) MCG/ACT inhaler Inhale 2 puffs into the lungs every 4 hours as needed for shortness of breath, wheezing or cough 18 g 0    amitriptyline (ELAVIL) 10 MG tablet  "Take 10 mg by mouth at bedtime.      atorvastatin (LIPITOR) 40 MG tablet Take 40 mg by mouth daily.      baclofen (LIORESAL) 10 MG tablet Take 20 mg by mouth 3 times daily.      melatonin 3 MG tablet Take 3 mg by mouth at bedtime.      mirtazapine (REMERON) 30 MG tablet Take 30 mg by mouth at bedtime.      ondansetron (ZOFRAN ODT) 4 MG ODT tab Take 4 mg by mouth every 8 hours as needed for nausea.      oxyCODONE (ROXICODONE) 5 MG tablet Take 1 tablet (5 mg) by mouth daily as needed for severe pain. 10 tablet 0    polyethylene glycol (MIRALAX) 17 g packet Take 1 packet by mouth daily as needed for constipation.      senna-docusate (SENOKOT-S/PERICOLACE) 8.6-50 MG tablet Take 1 tablet by mouth 2 times daily as needed for constipation.      umeclidinium-vilanterol (ANORO ELLIPTA) 62.5-25 MCG/ACT oral inhaler Inhale 1 puff into the lungs daily.       No current facility-administered medications for this visit.      ROS:  Patient denies fever, chills,  lightheadedness, dizziness, rhinorrhea, cough, congestion, shortness of breath, chest pain, palpitations, abdominal pain, n/v, diarrhea, constipation, change in appetite, change in sleep pattern, dysuria, frequency, burning or pain with urination.  Other than stated in HPI all other review of systems is negative.      Vital signs:/71   Pulse 62   Temp 97.2  F (36.2  C)   Resp 16   Ht 1.702 m (5' 7\")   Wt 78.1 kg (172 lb 3.2 oz)   SpO2 94%   BMI 26.97 kg/m     GENERAL APPEARANCE: Well developed, well nourished, in no acute distress.  LUNGS: Lung sounds CTA, no adventitious sounds, respiratory effort normal.  CARD: RRR, systolic murmur  ABD: Soft, nondistended and nontender with normal bowel sounds.   EXTREMITIES: No cyanosis, clubbing or edema.  NEURO: Alert and oriented x 3. Right facial droop. Right sided weakness  PSYCH: memory and judgement impaired at baseline     Lab/Diagnostic data:  Labs reviewed in EPIC by me including mag, " BMP    ASSESSMENT/PLAN:  Cranioplasty  Hx of CVA  Right sided spasms  Right sided hemiplegia of right dominant side  Left MCA distribution. S/p decompressive hemicraniectomy due to cerebral edema, and hemorrhagic transformation.  Right sided hemiplegia and right facial droop.   Continue statin.  Increase baclofen to 20 mg TID- discussed risks and benefits to pt    Type II Diabetes  CKD stage 3b  Blood sugars 100-200.   Continue Jardiance 10 mg daily. Last A1c 1/16/25 6.6  Creat 3/7/25 1.49, eGFR 38  At next visit obtain hgb A1c    G tube feeding  Morbid obesity  Stable Dietician follows for nutritional and hydration needs.   Adult Formula bolus feeding TID. Weights 170lbs- 175lbs. Continue to trend weights  Today 174 lbs    Afib  Not on anticoagulation given brain bleeding.   Digoxin discontinued in hospital  Rates controlled in the 60s    HTN  Stable. Well controlled not on BP meds. Bps 120s- 130s/ 70s    COPD  No respiratory concerns today  Hx cigarette use  Continue Anoro Ellipta     Orders:  Increase baclofen    Electronically signed by:CINDY Hodges CNP on 3/13/2025 at 2:00 PM

## 2025-03-19 ENCOUNTER — PATIENT OUTREACH (OUTPATIENT)
Dept: GERIATRIC MEDICINE | Facility: CLINIC | Age: 70
End: 2025-03-19
Payer: COMMERCIAL

## 2025-03-19 ENCOUNTER — TELEPHONE (OUTPATIENT)
Dept: GERIATRICS | Facility: CLINIC | Age: 70
End: 2025-03-19
Payer: COMMERCIAL

## 2025-03-19 DIAGNOSIS — I69.398 SPASTICITY AS LATE EFFECT OF CEREBROVASCULAR ACCIDENT (CVA): ICD-10-CM

## 2025-03-19 DIAGNOSIS — R25.2 SPASTICITY AS LATE EFFECT OF CEREBROVASCULAR ACCIDENT (CVA): ICD-10-CM

## 2025-03-19 RX ORDER — OXYCODONE HYDROCHLORIDE 5 MG/1
5 TABLET ORAL DAILY PRN
Qty: 30 TABLET | Refills: 0 | Status: SHIPPED | OUTPATIENT
Start: 2025-03-19

## 2025-03-19 NOTE — PROGRESS NOTES
TRANSITIONS OF CARE (NEELIMA) LOG    NEELIMA tasks should be completed by the CC within one (1) business day of notification of each transition. Follow up contact with member is required after return to their usual care setting.  Note:  If CC finds out about the transitions fifteen (15) days or more after the member has returned to their usual care setting, no NEELIMA log is needed. However, the CC should check in with the member to discuss the transition process, any changes needed to the care plan and document it in a case note.     Member Name:  Roya Burgos [facility] [place] Name:  [employer] Member ID#: [id number]   Product: [facility] Care Coordinator Contact:  CHHAYA Cox, [facility] Agency/County/Care System: [employer]   Transition Communication Actions from Care Management Contact   Transition #1   Notification Date: 3/19/25 Transition Date:   3/15/25 Transition From: Nursing Home, [facility]     Is this the member s usual care setting?               yes Transition To: Hospital, [facility]    Transition Type:  Unplanned  Roya Burgos admitted on 3/15 to [facility] due to left-sided weakness and somnolence. She discharged on 3/18 back to [facility].     Documentation from conversation with the member/responsible party, provider, discharging and receiving facility:   Date: 3/19/25: Received notification of admission to hospital with dx of left-sided weakness and somnolence.  Roya already discharged back to NH/[facility] by time of notification.  CC reached out to adult daughter Miles Burgos  regarding transition and offered support as needed.  Reviewed and update support plan as needed.  Notified community service providers and placed services None on hold as needed.  Transition log initiated.   PCP, Anny Tyler, notified of hospitalization via EMR.    CHHAYA Cox, [facility]  [phone]       Transition #2   Notification Date:  3/19/25 Transition Date:   3/18/25 Transition From: Hospital, [facility]      Is this the member s usual care setting?               no Transition To: Nursing Home, [facility]   Transition Type:  Planned    Documentation from conversation with the member/responsible party, provider, discharging and receiving facility:   Date: 3/19/25: Received notification of transition to home.  Roya already discharged back to NH/[facility] by time of notification.  CC reached out to adult daughter Miles Burgos regarding transition and offered support as needed.  Member has a follow-up appointment with PCP in 7 days: Yes: will be followed by facility on-site care team.   Member has had a change in condition: No  Home visit needed: No  Support Plan reviewed and updated.  The following home based services None were resumed.  New referrals placed: No  Transition log completed.   PCP, Anny Tyler, notified of transition back to home via EMR.    CHHAYA Cox, [facility]  [phone]         *RETURN TO USUAL CARE SETTING: *Complete tasks below when the member is discharging TO their usual care setting within one (1) business day of notification..      For situations where the Care Coordinator is notified of the discharge prior to the date of discharge, the Care Coordinator must follow up with the member or designated representative to confirm that discharge actually occurred and discuss required NEELIMA tasks as outlined in the NEELIMA Instructions.  (This includes situations where it may be a  new  usual care setting for the member. (i.e., a community member who decides upon permanent nursing home placement following hospitalization and rehab).    Discuss with Member/Responsible Party:    Check  Yes  - if the member, family member and/or SNF/facility staff manages the following:    If  No  provide explanation in the comments section.          Date completed: 3/19/25 Communicated with  member or their designated representative about the following:  care transition process; about changes to the member s health status; plan of care updates; education about transitions and how to prevent unplanned transitions/readmissions    Four Pillars for Optimal Transition:    Check  Yes  - if the member, family member and/or SNF/facility staff manages the following:    If  No  provide explanation in the comments section.          [x]  Yes     []  No Does the member have a follow-up appointment scheduled with primary care or specialist? (Mental health hospitalizations--the appt. should be w/in 7 days)              For mental health hospitalizations:  []  Yes     []  No     Does the member have a follow-up appointment scheduled with a mental health practitioner within 7 days of discharge?  [x]  Yes     []  No     Has a medication review been completed with member? If no, refer to PCP, home care nurse, MTM, pharmacist  [x]  Yes     []  No     Can the member manage their medications or is there a system in place to manage medications (e.g. home care set-up)?         [x]  Yes     []  No     Can the member verbalize warning signs and symptoms to watch for and how to respond?  [x]  Yes     []  No     Does the member have a copy of and understand their discharge instructions?  If no, assist to obtain copy of discharge instructions, review discharge instructions, and assist to contact PCP to discuss questions about their recent hospitalization.  [x]  Yes     []  No     Does the member have adequate food, housing and transportation?  If no, add goal and discuss additional supports available to the member                                                                                                                                                                                 [x]  Yes     []  No     Is the member safe in their home?  If no, document needs and support provided                                                                                                                                                                           []  Yes     [x]  No     Are there any concerns of vulnerability, abuse, or neglect?  If yes, document concerns and actions taken by Care Coordinator as a mandated                                                                                                                                                                              [x]  Yes     []  No     Does the member use a Personal Health Care Record?  Check  Yes  if visit summary, discharge summary, and/or healthcare summary are being used as a PHR.                                                                                                                                                                                  [x]  Yes     []  No     Have you reviewed the discharge summary with the member? If  No  provide explanation in comments.  [x]  Yes     []  No     Have you updated the member s care plan/support plan? Add new diagnosis, medications, treatments, goals & interventions, as applicable. If No, provide explanation in comments.    Comments:           CHHAYA Cox, [facility]  [phone]

## 2025-03-19 NOTE — TELEPHONE ENCOUNTER
"[facility] Triage Call    Provider: CINDY Stacy CNP  Facility: [facility]  Facility Type:  C    Caller: Anna  Call Back Number: [phone]    Allergies:  No Known Allergies     SBAR:     S-(situation): Headache pain    B-(background): PMH of left MCA ischemic stroke s/p left cranioplasty on 1/16/25. Returned from hospital yesterday where she was consulted by Neurology for altered mental status and encephalopathy. Amitriptyline and Tylenol were discontinued.      A-(assessment): Today patient c/o severe headache/pain. Took PRN Oxycodone 5mg this morning around 0930 and was effective for about 1 hour. Has no current order for Tylenol.     R-(recommendations): Tylenol 1000mg PO QID PRN for pain. Provider will follow up.        Telephone encounter sent to:  CINDY Stacy CNP    Please send response/orders to \"Geriatrics Nurse Pool\"    Alicia Krishnamurthy RN      "

## 2025-03-20 ENCOUNTER — NURSING HOME VISIT (OUTPATIENT)
Dept: GERIATRICS | Facility: CLINIC | Age: 70
End: 2025-03-20
Payer: COMMERCIAL

## 2025-03-20 VITALS
WEIGHT: 168.5 LBS | DIASTOLIC BLOOD PRESSURE: 79 MMHG | SYSTOLIC BLOOD PRESSURE: 124 MMHG | TEMPERATURE: 97.9 F | OXYGEN SATURATION: 95 % | HEIGHT: 67 IN | BODY MASS INDEX: 26.45 KG/M2 | RESPIRATION RATE: 17 BRPM | HEART RATE: 94 BPM

## 2025-03-20 DIAGNOSIS — N18.32 TYPE 2 DIABETES MELLITUS WITH STAGE 3B CHRONIC KIDNEY DISEASE, WITHOUT LONG-TERM CURRENT USE OF INSULIN (H): ICD-10-CM

## 2025-03-20 DIAGNOSIS — J44.9 CHRONIC OBSTRUCTIVE PULMONARY DISEASE, UNSPECIFIED COPD TYPE (H): ICD-10-CM

## 2025-03-20 DIAGNOSIS — I48.0 PAROXYSMAL ATRIAL FIBRILLATION (H): ICD-10-CM

## 2025-03-20 DIAGNOSIS — R25.2 SPASTICITY AS LATE EFFECT OF CEREBROVASCULAR ACCIDENT (CVA): ICD-10-CM

## 2025-03-20 DIAGNOSIS — G44.209 TENSION HEADACHE: ICD-10-CM

## 2025-03-20 DIAGNOSIS — Z93.1 G TUBE FEEDINGS (H): ICD-10-CM

## 2025-03-20 DIAGNOSIS — Z86.73 HISTORY OF CVA (CEREBROVASCULAR ACCIDENT): Primary | ICD-10-CM

## 2025-03-20 DIAGNOSIS — N18.32 CHRONIC KIDNEY DISEASE, STAGE 3B (H): ICD-10-CM

## 2025-03-20 DIAGNOSIS — E66.01 OBESITY, MORBID (H): ICD-10-CM

## 2025-03-20 DIAGNOSIS — K59.01 SLOW TRANSIT CONSTIPATION: ICD-10-CM

## 2025-03-20 DIAGNOSIS — E11.22 TYPE 2 DIABETES MELLITUS WITH STAGE 3B CHRONIC KIDNEY DISEASE, WITHOUT LONG-TERM CURRENT USE OF INSULIN (H): ICD-10-CM

## 2025-03-20 DIAGNOSIS — I69.398 SPASTICITY AS LATE EFFECT OF CEREBROVASCULAR ACCIDENT (CVA): ICD-10-CM

## 2025-03-20 DIAGNOSIS — Z98.890 HISTORY OF CRANIOPLASTY: ICD-10-CM

## 2025-03-20 DIAGNOSIS — M79.621 PAIN OF RIGHT UPPER ARM: ICD-10-CM

## 2025-03-20 DIAGNOSIS — I69.351 FLACCID HEMIPLEGIA OF RIGHT DOMINANT SIDE AS LATE EFFECT OF CEREBRAL INFARCTION (H): ICD-10-CM

## 2025-03-20 DIAGNOSIS — I10 ESSENTIAL HYPERTENSION: ICD-10-CM

## 2025-03-20 PROBLEM — E78.5 DYSLIPIDEMIA: Status: ACTIVE | Noted: 2022-06-27

## 2025-03-20 PROBLEM — Z99.3 WHEELCHAIR DEPENDENCE: Status: ACTIVE | Noted: 2025-03-12

## 2025-03-20 PROBLEM — I67.9 CEREBROVASCULAR DISEASE: Status: ACTIVE | Noted: 2025-03-16

## 2025-03-20 PROBLEM — R60.0 LOCALIZED EDEMA: Status: ACTIVE | Noted: 2022-06-27

## 2025-03-20 PROBLEM — D72.829 LEUKOCYTOSIS: Status: ACTIVE | Noted: 2025-03-16

## 2025-03-20 PROBLEM — R06.81 CENTRAL APNEA: Status: ACTIVE | Noted: 2025-03-16

## 2025-03-20 PROBLEM — Z79.4 TYPE 2 DIABETES MELLITUS WITH STAGE 3 CHRONIC KIDNEY DISEASE, WITH LONG-TERM CURRENT USE OF INSULIN (H): Chronic | Status: ACTIVE | Noted: 2022-06-27

## 2025-03-20 PROBLEM — N18.30 TYPE 2 DIABETES MELLITUS WITH STAGE 3 CHRONIC KIDNEY DISEASE, WITH LONG-TERM CURRENT USE OF INSULIN (H): Chronic | Status: ACTIVE | Noted: 2022-06-27

## 2025-03-20 PROBLEM — N18.30 STAGE 3 CHRONIC KIDNEY DISEASE (H): Status: ACTIVE | Noted: 2022-06-27

## 2025-03-20 PROBLEM — N04.9 NEPHROTIC SYNDROME: Status: ACTIVE | Noted: 2022-06-28

## 2025-03-20 RX ORDER — LIDOCAINE 4 G/G
1 PATCH TOPICAL EVERY 24 HOURS
COMMUNITY

## 2025-03-20 RX ORDER — GABAPENTIN 250 MG/5ML
100 SOLUTION ORAL 2 TIMES DAILY
COMMUNITY

## 2025-03-20 NOTE — LETTER
" 3/20/2025      Roya Burgos  C/o Miles Burgos  [address]  [place] [address]        [facility] GERIATRICS    PRIMARY CARE PROVIDER AND CLINIC:  CINDY Hodges [facility], [address] / [place] [address]  Chief Complaint   Patient presents with     RECHECK      [place] Medical Record Number:  [id number]  Place of Service where encounter took place:  [facility] () [[id number]]    Roya Burgos  is a 69 year old  (1955), admitted to the above facility from  [facility] . Hospital stay 3/15/25 through 3/18/25.. PMHx includes left MCA ischemic stroke with residual right-sided deficits associated with malignant cerebral edema status post left cranioplasty on 1/16/25, intracranial hypertension, HFrEF, T2D, CKD stage 3, COPD, HTN, HLD, and PAD     Joanna was recently admitted for encephalopathy and pain. Due to complaints of right sided muscle spasms, she was recently started on Baclofen. This was thought to be the cause of her altered mental status. In the hospital dose was decreased and suggested to be weaned off completely. Low dose gabapentin was started. Ammonia normal, LFTs normal, CRP elevated, procalcitonin elevated, drug panel positive for oxycodone, digoxin normal.     Nursing reports they have recently found Roya smoking in her room. She was found to have smoked 10 cigarettes. She was started on a nicotine patch. This was her second offense in the last couple of months. Today she was found to have bottles of alcohol in her room which was removed by the clinical manager.     Today when I visit Roya, she states she has pain in her left leg and her right arm. She just received oxycodone. Discussed that I would like to wean her from the oxycodone as we adjust her gabapentin dosing. She describes her pain as \"spasms, achy.\" She has a lidocaine patch on her left leg. Discussed that we will restart her tylenol scheduled. She on an off complains " "of a 10/10 headache which has been a symptom for > 6 months. Discussed that I would like her to follow up with Neurology. We had a lengthy discussion around the importance of her being compliant with facility expectations of no smoking and drinking only if approved by her care team given she is on medications that could dangerously interact. She reports she is sleeping well. Appetite stable. Mood stable. She is constipated, \"poop is like golf balls.\"  Denies urinary symptoms including dysuria or hematuria. Denies shortness of breath, chest pain, palpitations, dizziness, lightheadedness.       CODE STATUS/ADVANCE DIRECTIVES DISCUSSION:  Prior  DNR/DNI   ALLERGIES: No Known Allergies   PAST MEDICAL HISTORY:   Past Medical History:   Diagnosis Date     Diabetes mellitus, type II, insulin dependent (H)      HTN (hypertension)      Major depression       Current Outpatient Medications   Medication Sig Dispense Refill     gabapentin (NEURONTIN) 250 MG/5ML solution Take 100 mg by mouth 2 times daily.       Lidocaine (LIDOCARE) 4 % Patch Place 1 patch onto the skin every 24 hours. To prevent lidocaine toxicity, patient should be patch free for 12 hrs daily.       nicotine (NICODERM CQ) 7 MG/24HR 24 hr patch Place 1 patch onto the skin every 24 hours.       acetaminophen (TYLENOL) 500 MG tablet Take 1,000 mg by mouth every 6 hours as needed for pain.       albuterol (PROAIR HFA/PROVENTIL HFA/VENTOLIN HFA) 108 (90 Base) MCG/ACT inhaler Inhale 2 puffs into the lungs every 4 hours as needed for shortness of breath, wheezing or cough 18 g 0     atorvastatin (LIPITOR) 40 MG tablet Take 40 mg by mouth daily.       empagliflozin (JARDIANCE) 10 MG TABS tablet Take 10 mg by mouth daily.       melatonin 3 MG tablet Take 3 mg by mouth at bedtime.       mirtazapine (REMERON) 30 MG tablet Take 30 mg by mouth at bedtime.       ondansetron (ZOFRAN ODT) 4 MG ODT tab Take 4 mg by mouth every 8 hours as needed for nausea.       oxyCODONE " "(ROXICODONE) 5 MG tablet Take 1 tablet (5 mg) by mouth daily as needed for pain. 30 tablet 0     polyethylene glycol (MIRALAX) 17 g packet Take 1 packet by mouth daily as needed for constipation.       senna-docusate (SENOKOT-S/PERICOLACE) 8.6-50 MG tablet Take 2 tablets by mouth 2 times daily as needed for constipation.       umeclidinium-vilanterol (ANORO ELLIPTA) 62.5-25 MCG/ACT oral inhaler Inhale 1 puff into the lungs daily.       No current facility-administered medications for this visit.         ROS:  Patient denies fever, chills, headache, lightheadedness, dizziness, rhinorrhea, cough, congestion, shortness of breath, chest pain, palpitations, abdominal pain, n/v, diarrhea, constipation, change in appetite, change in sleep pattern, dysuria, frequency, burning or pain with urination.  Other than stated in HPI all other review of systems is negative.      Vital signs:/79   Pulse 94   Temp 97.9  F (36.6  C)   Resp 17   Ht 1.702 m (5' 7\")   Wt 76.4 kg (168 lb 8 oz)   SpO2 95%   BMI 26.39 kg/m     GENERAL APPEARANCE: Well developed, well nourished, in no acute distress.  LUNGS: Lung sounds CTA, no adventitious sounds, respiratory effort normal.  CARD: RRR, systolic murmur  ABD: Soft, nondistended and nontender with normal bowel sounds.   MSK: Muscle strength and tone were equal bilaterally. Moves all extremities easily and intentionally.   EXTREMITIES: No cyanosis, clubbing or edema.  NEURO: Alert and oriented x 3.Right sided facial droop, right sided hemiplegia  PSYCH: memory and judgment impaired at baseline     Lab/Diagnostic data:  Labs reviewed in EPIC by me including BMP, CBC, mag    ASSESSMENT/PLAN:  Cranioplasty  Hx of CVA  Right sided spasms/pain  Right sided hemiplegia of right dominant side  Headache  Left MCA distribution. S/p decompressive hemicraniectomy due to cerebral edema, and hemorrhagic transformation.  Right sided hemiplegia and right facial droop.   Continue statin  Follow up " with Neurology  Schedule tylenol  Continue gabapentin- consider increasing if tolerating  Continue lidocaine patch 4%  Discontinue baclofen due to side effects (AMS)  Oxycodone 5 mg once daily PRN- plan to wean     Type II Diabetes  CKD stage 3b  3/15/25 A1c 6.5%  Continue Jardiance 10 mg daily  Creat 3/18/25 1.47; eGFR 39    G tube feeding  Morbid obesity  Constipation  Stable Dietician follows for nutritional and hydration needs.   Adult Formula bolus feeding TID  Increase senna to 2 tabs BID  Continue to trend weights  Today 168.5 lbs    Afib  Not on anticoagulation given brain bleeding.   Rates controlled in the 60s    HTN  Stable. Well controlled not on BP meds. Bps 120s- 130s/ 70s  No changes today    COPD  Discussed importance of no smoking in facility  Nicotine patch   Continue Anoro Ellipta     Orders:  Discontinue baclofen  Schedule tylenol  Increase senna    >45 minutes spent assessing patient, providing education, reviewing records from recent hospitalization at [facility], collaborating with nursing, developing plan of care.     Electronically signed by:CINDY Hodges CNP on 3/20/2025 at 3:10 PM               Sincerely,        CINDY Hodges CNP    Electronically signed

## 2025-03-20 NOTE — PROGRESS NOTES
"[facility] GERIATRICS    PRIMARY CARE PROVIDER AND CLINIC:  CINDY Hodges CNP, [address] [facility] / [place] [address]  Chief Complaint   Patient presents with    SAIRA      [place] Medical Record Number:  [id number]  Place of Service where encounter took place:  [facility] () [[id number]]    Roya Burgos  is a 69 year old  (1955), admitted to the above facility from  [facility] . Hospital stay 3/15/25 through 3/18/25.. PMHx includes left MCA ischemic stroke with residual right-sided deficits associated with malignant cerebral edema status post left cranioplasty on 1/16/25, intracranial hypertension, HFrEF, T2D, CKD stage 3, COPD, HTN, HLD, and PAD     Joanna was recently admitted for encephalopathy and pain. Due to complaints of right sided muscle spasms, she was recently started on Baclofen. This was thought to be the cause of her altered mental status. In the hospital dose was decreased and suggested to be weaned off completely. Low dose gabapentin was started. Ammonia normal, LFTs normal, CRP elevated, procalcitonin elevated, drug panel positive for oxycodone, digoxin normal.     Nursing reports they have recently found Roya smoking in her room. She was found to have smoked 10 cigarettes. She was started on a nicotine patch. This was her second offense in the last couple of months. Today she was found to have bottles of alcohol in her room which was removed by the clinical manager.     Today when I visit Roya, she states she has pain in her left leg and her right arm. She just received oxycodone. Discussed that I would like to wean her from the oxycodone as we adjust her gabapentin dosing. She describes her pain as \"spasms, achy.\" She has a lidocaine patch on her left leg. Discussed that we will restart her tylenol scheduled. She on an off complains of a 10/10 headache which has been a symptom for > 6 months. Discussed that I would like her to follow up " "with Neurology. We had a lengthy discussion around the importance of her being compliant with facility expectations of no smoking and drinking only if approved by her care team given she is on medications that could dangerously interact. She reports she is sleeping well. Appetite stable. Mood stable. She is constipated, \"poop is like golf balls.\"  Denies urinary symptoms including dysuria or hematuria. Denies shortness of breath, chest pain, palpitations, dizziness, lightheadedness.       CODE STATUS/ADVANCE DIRECTIVES DISCUSSION:  Prior  DNR/DNI   ALLERGIES: No Known Allergies   PAST MEDICAL HISTORY:   Past Medical History:   Diagnosis Date    Diabetes mellitus, type II, insulin dependent (H)     HTN (hypertension)     Major depression       Current Outpatient Medications   Medication Sig Dispense Refill    gabapentin (NEURONTIN) 250 MG/5ML solution Take 100 mg by mouth 2 times daily.      Lidocaine (LIDOCARE) 4 % Patch Place 1 patch onto the skin every 24 hours. To prevent lidocaine toxicity, patient should be patch free for 12 hrs daily.      nicotine (NICODERM CQ) 7 MG/24HR 24 hr patch Place 1 patch onto the skin every 24 hours.      acetaminophen (TYLENOL) 500 MG tablet Take 1,000 mg by mouth every 6 hours as needed for pain.      albuterol (PROAIR HFA/PROVENTIL HFA/VENTOLIN HFA) 108 (90 Base) MCG/ACT inhaler Inhale 2 puffs into the lungs every 4 hours as needed for shortness of breath, wheezing or cough 18 g 0    atorvastatin (LIPITOR) 40 MG tablet Take 40 mg by mouth daily.      empagliflozin (JARDIANCE) 10 MG TABS tablet Take 10 mg by mouth daily.      melatonin 3 MG tablet Take 3 mg by mouth at bedtime.      mirtazapine (REMERON) 30 MG tablet Take 30 mg by mouth at bedtime.      ondansetron (ZOFRAN ODT) 4 MG ODT tab Take 4 mg by mouth every 8 hours as needed for nausea.      oxyCODONE (ROXICODONE) 5 MG tablet Take 1 tablet (5 mg) by mouth daily as needed for pain. 30 tablet 0    polyethylene glycol " "(MIRALAX) 17 g packet Take 1 packet by mouth daily as needed for constipation.      senna-docusate (SENOKOT-S/PERICOLACE) 8.6-50 MG tablet Take 2 tablets by mouth 2 times daily as needed for constipation.      umeclidinium-vilanterol (ANORO ELLIPTA) 62.5-25 MCG/ACT oral inhaler Inhale 1 puff into the lungs daily.       No current facility-administered medications for this visit.         ROS:  Patient denies fever, chills, headache, lightheadedness, dizziness, rhinorrhea, cough, congestion, shortness of breath, chest pain, palpitations, abdominal pain, n/v, diarrhea, constipation, change in appetite, change in sleep pattern, dysuria, frequency, burning or pain with urination.  Other than stated in HPI all other review of systems is negative.      Vital signs:/79   Pulse 94   Temp 97.9  F (36.6  C)   Resp 17   Ht 1.702 m (5' 7\")   Wt 76.4 kg (168 lb 8 oz)   SpO2 95%   BMI 26.39 kg/m     GENERAL APPEARANCE: Well developed, well nourished, in no acute distress.  LUNGS: Lung sounds CTA, no adventitious sounds, respiratory effort normal.  CARD: RRR, systolic murmur  ABD: Soft, nondistended and nontender with normal bowel sounds.   MSK: Muscle strength and tone were equal bilaterally. Moves all extremities easily and intentionally.   EXTREMITIES: No cyanosis, clubbing or edema.  NEURO: Alert and oriented x 3.Right sided facial droop, right sided hemiplegia  PSYCH: memory and judgment impaired at baseline     Lab/Diagnostic data:  Labs reviewed in EPIC by me including BMP, CBC, mag    ASSESSMENT/PLAN:  Cranioplasty  Hx of CVA  Right sided spasms/pain  Right sided hemiplegia of right dominant side  Headache  Left MCA distribution. S/p decompressive hemicraniectomy due to cerebral edema, and hemorrhagic transformation.  Right sided hemiplegia and right facial droop.   Continue statin  Follow up with Neurology  Schedule tylenol  Continue gabapentin- consider increasing if tolerating  Continue lidocaine patch " 4%  Discontinue baclofen due to side effects (AMS)  Oxycodone 5 mg once daily PRN- plan to wean     Type II Diabetes  CKD stage 3b  3/15/25 A1c 6.5%  Continue Jardiance 10 mg daily  Creat 3/18/25 1.47; eGFR 39    G tube feeding  Morbid obesity  Constipation  Stable Dietician follows for nutritional and hydration needs.   Adult Formula bolus feeding TID  Increase senna to 2 tabs BID  Continue to trend weights  Today 168.5 lbs    Afib  Not on anticoagulation given brain bleeding.   Rates controlled in the 60s    HTN  Stable. Well controlled not on BP meds. Bps 120s- 130s/ 70s  No changes today    COPD  Discussed importance of no smoking in facility  Nicotine patch   Continue Anoro Ellipta     Orders:  Discontinue baclofen  Schedule tylenol  Increase senna    >45 minutes spent assessing patient, providing education, reviewing records from recent hospitalization at [facility], collaborating with nursing, developing plan of care.     Electronically signed by:CINDY Hodges CNP on 3/20/2025 at 3:10 PM

## 2025-03-22 ENCOUNTER — TELEPHONE (OUTPATIENT)
Dept: GERIATRICS | Facility: CLINIC | Age: 70
End: 2025-03-22
Payer: COMMERCIAL

## 2025-03-22 NOTE — TELEPHONE ENCOUNTER
[place] [facility] TRIAGE ENCOUNTER    Chief Complaint   Patient presents with    Pain       Roya Burgos is a 69 year old  (1955), Nurse called today to report: patient with pain 10/10. Taking all scheduled medications and PRNs. No side effects from gabapentin.    ASSESSMENT/PLAN  Increase Gabapentin to 100 mg TID    Electronically signed by:   Abi Vera, NP

## 2025-03-27 ENCOUNTER — LAB REQUISITION (OUTPATIENT)
Dept: LAB | Facility: CLINIC | Age: 70
End: 2025-03-27
Payer: COMMERCIAL

## 2025-03-27 DIAGNOSIS — N18.31 CHRONIC KIDNEY DISEASE, STAGE 3A (H): ICD-10-CM

## 2025-03-27 DIAGNOSIS — E11.42 TYPE 2 DIABETES MELLITUS WITH DIABETIC POLYNEUROPATHY (H): ICD-10-CM

## 2025-04-02 LAB
ANION GAP SERPL CALCULATED.3IONS-SCNC: 8 MMOL/L (ref 7–15)
BUN SERPL-MCNC: 32.3 MG/DL (ref 8–23)
CALCIUM SERPL-MCNC: 10 MG/DL (ref 8.8–10.4)
CHLORIDE SERPL-SCNC: 103 MMOL/L (ref 98–107)
CREAT SERPL-MCNC: 1.29 MG/DL (ref 0.51–0.95)
EGFRCR SERPLBLD CKD-EPI 2021: 45 ML/MIN/1.73M2
EST. AVERAGE GLUCOSE BLD GHB EST-MCNC: 146 MG/DL
GLUCOSE SERPL-MCNC: 93 MG/DL (ref 70–99)
HBA1C MFR BLD: 6.7 %
HCO3 SERPL-SCNC: 28 MMOL/L (ref 22–29)
POTASSIUM SERPL-SCNC: 4.7 MMOL/L (ref 3.4–5.3)
SODIUM SERPL-SCNC: 139 MMOL/L (ref 135–145)

## 2025-04-02 PROCEDURE — 80048 BASIC METABOLIC PNL TOTAL CA: CPT | Mod: ORL

## 2025-04-02 PROCEDURE — 36415 COLL VENOUS BLD VENIPUNCTURE: CPT | Mod: ORL | Performed by: FAMILY MEDICINE

## 2025-04-02 PROCEDURE — P9604 ONE-WAY ALLOW PRORATED TRIP: HCPCS | Mod: ORL | Performed by: FAMILY MEDICINE

## 2025-04-02 PROCEDURE — 83036 HEMOGLOBIN GLYCOSYLATED A1C: CPT | Mod: ORL | Performed by: FAMILY MEDICINE

## 2025-04-16 ENCOUNTER — NURSING HOME VISIT (OUTPATIENT)
Dept: GERIATRICS | Facility: CLINIC | Age: 70
End: 2025-04-16
Payer: COMMERCIAL

## 2025-04-16 VITALS
TEMPERATURE: 98.1 F | RESPIRATION RATE: 16 BRPM | DIASTOLIC BLOOD PRESSURE: 53 MMHG | WEIGHT: 175.2 LBS | SYSTOLIC BLOOD PRESSURE: 109 MMHG | BODY MASS INDEX: 27.5 KG/M2 | HEART RATE: 65 BPM | HEIGHT: 67 IN | OXYGEN SATURATION: 96 %

## 2025-04-16 DIAGNOSIS — I48.0 PAROXYSMAL ATRIAL FIBRILLATION (H): ICD-10-CM

## 2025-04-16 DIAGNOSIS — Z86.73 HISTORY OF CVA (CEREBROVASCULAR ACCIDENT): ICD-10-CM

## 2025-04-16 DIAGNOSIS — N18.32 CHRONIC KIDNEY DISEASE, STAGE 3B (H): ICD-10-CM

## 2025-04-16 DIAGNOSIS — I69.351 FLACCID HEMIPLEGIA OF RIGHT DOMINANT SIDE AS LATE EFFECT OF CEREBRAL INFARCTION (H): ICD-10-CM

## 2025-04-16 DIAGNOSIS — K59.01 SLOW TRANSIT CONSTIPATION: ICD-10-CM

## 2025-04-16 DIAGNOSIS — I10 ESSENTIAL HYPERTENSION: ICD-10-CM

## 2025-04-16 DIAGNOSIS — N18.32 TYPE 2 DIABETES MELLITUS WITH STAGE 3B CHRONIC KIDNEY DISEASE, WITHOUT LONG-TERM CURRENT USE OF INSULIN (H): ICD-10-CM

## 2025-04-16 DIAGNOSIS — M79.621 PAIN OF RIGHT UPPER ARM: ICD-10-CM

## 2025-04-16 DIAGNOSIS — R25.2 SPASTICITY AS LATE EFFECT OF CEREBROVASCULAR ACCIDENT (CVA): ICD-10-CM

## 2025-04-16 DIAGNOSIS — Z98.890 HISTORY OF CRANIOPLASTY: Primary | ICD-10-CM

## 2025-04-16 DIAGNOSIS — E66.01 OBESITY, MORBID (H): ICD-10-CM

## 2025-04-16 DIAGNOSIS — G44.209 TENSION HEADACHE: ICD-10-CM

## 2025-04-16 DIAGNOSIS — I69.398 SPASTICITY AS LATE EFFECT OF CEREBROVASCULAR ACCIDENT (CVA): ICD-10-CM

## 2025-04-16 DIAGNOSIS — J44.9 CHRONIC OBSTRUCTIVE PULMONARY DISEASE, UNSPECIFIED COPD TYPE (H): ICD-10-CM

## 2025-04-16 DIAGNOSIS — E11.22 TYPE 2 DIABETES MELLITUS WITH STAGE 3B CHRONIC KIDNEY DISEASE, WITHOUT LONG-TERM CURRENT USE OF INSULIN (H): ICD-10-CM

## 2025-04-16 DIAGNOSIS — Z93.1 G TUBE FEEDINGS (H): ICD-10-CM

## 2025-04-16 NOTE — PROGRESS NOTES
[facility] GERIATRICS  Chief Complaint   Patient presents with    long-term Regulatory     [place] Medical Record Number:  [id number]  Place of Service where encounter took place:  [facility] () [[id number]]    HPI:    Roya Burgos  is 70 year old (1955), who is being seen today for a federally mandated E/M visit. Today's concerns are: none    Nursing does not have concerns today. Unfortunately Joanna's appointment with Neurology was pushed back per the clinic. She continues to have right sided pain and headaches for which she takes oxycodone ~ 1 daily. Unfortunately, failed treatment with baclofen (resulted in encephalopathy). She has been found on several occasions smoking cigarettes and vaping in her room at the facility. Nursing/facility leadership has been addressing this. Sleeping well. Appetite stable. Mood stable. Having daily bowel movements. Denies urinary symptoms including dysuria or hematuria. Denies shortness of breath, chest pain, palpitations, dizziness, lightheadedness.     ALLERGIES:Patient has no known allergies.  PAST MEDICAL HISTORY:   Past Medical History:   Diagnosis Date    Diabetes mellitus, type II, insulin dependent (H)     HTN (hypertension)     Major depression      PAST SURGICAL HISTORY:   has a past surgical history that includes Ankle surgery; IR Lower Extremity Angiogram Left (4/6/2016); and IR Lower Extremity Angiogram Left (2/3/2016).  FAMILY HISTORY: family history includes Asthma in her mother; Cancer - colorectal (age of onset: 52) in her sister; Diabetes in her brother, sister, and sister.  SOCIAL HISTORY: continues to smoke cigarettes and vape. Unsure of how much/often    MEDICATIONS:  Current Outpatient Medications   Medication Sig Dispense Refill    ondansetron (ZOFRAN) 4 MG tablet Take by mouth every 8 hours as needed for nausea.      acetaminophen (TYLENOL) 500 MG tablet Take 1,000 mg by mouth every 6 hours as needed for pain.      albuterol  "(PROAIR HFA/PROVENTIL HFA/VENTOLIN HFA) 108 (90 Base) MCG/ACT inhaler Inhale 2 puffs into the lungs every 4 hours as needed for shortness of breath, wheezing or cough 18 g 0    atorvastatin (LIPITOR) 40 MG tablet Take 40 mg by mouth daily.      empagliflozin (JARDIANCE) 10 MG TABS tablet Take 10 mg by mouth daily.      gabapentin (NEURONTIN) 250 MG/5ML solution Take 100 mg by mouth 3 times daily.      Lidocaine (LIDOCARE) 4 % Patch Place 1 patch onto the skin every 24 hours. To prevent lidocaine toxicity, patient should be patch free for 12 hrs daily.      melatonin 3 MG tablet Take 3 mg by mouth at bedtime.      mirtazapine (REMERON) 30 MG tablet Take 30 mg by mouth at bedtime.      nicotine (NICODERM CQ) 7 MG/24HR 24 hr patch Place 1 patch onto the skin every 24 hours.      ondansetron (ZOFRAN ODT) 4 MG ODT tab Take 4 mg by mouth every 8 hours as needed for nausea.      oxyCODONE (ROXICODONE) 5 MG tablet Take 1 tablet (5 mg) by mouth daily as needed for pain. 30 tablet 0    polyethylene glycol (MIRALAX) 17 g packet Take 1 packet by mouth daily as needed for constipation.      senna-docusate (SENOKOT-S/PERICOLACE) 8.6-50 MG tablet Take 2 tablets by mouth 2 times daily as needed for constipation.      umeclidinium-vilanterol (ANORO ELLIPTA) 62.5-25 MCG/ACT oral inhaler Inhale 1 puff into the lungs daily.       No current facility-administered medications for this visit.       Case Management:  I have reviewed the care plan and MDS and do agree with the plan. Patient's desire to return to the community is present, but is not able due to care needs . Information reviewed:  Medications, vital signs, orders, and nursing notes.    ROS:  4 point ROS including Respiratory, CV, GI and , other than that noted in the HPI,  is negative    Vitals:  Vital signs:/53   Pulse 65   Temp 98.1  F (36.7  C)   Resp 16   Ht 1.702 m (5' 7\")   Wt 79.5 kg (175 lb 3.2 oz)   SpO2 96%   BMI 27.44 kg/m     GENERAL APPEARANCE: " Well developed, well nourished, in no acute distress.  LUNGS: Lung sounds CTA, no adventitious sounds, respiratory effort normal.  CARD: RRR, S1, S2, without murmurs, gallops, rubs, no JVD, peripheral pulses 2+ and symmetric  ABD: Soft, nondistended and nontender with normal bowel sounds.   MSK: Right sided baseline weakness  EXTREMITIES: No cyanosis, clubbing or edema.  NEURO: Alert and oriented x 3. Right sided facial droop (baseline)  PSYCH: memory and judgement impaired at baseline     Lab/Diagnostic data:   Labs reviewed in EPIC by me.      ASSESSMENT/PLAN:  Hx of Cranioplasty  Hx of CVA  Right sided spasms/pain  Right sided hemiplegia of right dominant side  Headache  Left MCA distribution. S/p decompressive hemicraniectomy due to cerebral edema, and hemorrhagic transformation.  Right sided hemiplegia and right facial droop.   Continue statin  Follow up with Neurology- appt was pushed back per clinic  Continue scheduled tylenol  Continue gabapentin 100 mg TID- consider increasing if tolerating  Continue lidocaine patch 4%  Oxycodone 5 mg once daily PRN- plan to wean      Type II Diabetes  CKD stage 3b  3/2/25A1c 6.7%  Continue Jardiance 10 mg daily  4/2/25 Creat 1.29; eGFR 45     G tube feeding  Morbid obesity  Constipation  Stable, Dietician follows for nutritional and hydration needs.   Adult Formula bolus feeding TID  Continue senna 2 tabs BID  Continue to trend weights  Today 175.2 lbs     Afib  Not on anticoagulation given brain bleeding.   Rates controlled in the 60/80s     HTN  Stable. Well controlled not on BP meds. Bps 100-1teens/70-80s     COPD  Discussed importance of no smoking in facility  Dc'd Nicotine patch due to pt found smoking several times  Continue Anoro Ellipta     Electronically signed by:  CINDY Hodges CNP on 4/17/2025 at 9:53 AM

## 2025-04-16 NOTE — LETTER
4/16/2025      Roya Burgos  C/o Miles Burgos  [address]  [place] [address]        [facility] GERIATRICS  Chief Complaint   Patient presents with     care home Regulatory     [place] Medical Record Number:  [id number]  Place of Service where encounter took place:  [facility] () [[id number]]    HPI:    Roya Burgos  is 70 year old (1955), who is being seen today for a federally mandated E/M visit. Today's concerns are: none    Nursing does not have concerns today. Unfortunately Joanna's appointment with Neurology was pushed back per the clinic. She continues to have right sided pain and headaches for which she takes oxycodone ~ 1 daily. Unfortunately, failed treatment with baclofen (resulted in encephalopathy). She has been found on several occasions smoking cigarettes and vaping in her room at the facility. Nursing/facility leadership has been addressing this. Sleeping well. Appetite stable. Mood stable. Having daily bowel movements. Denies urinary symptoms including dysuria or hematuria. Denies shortness of breath, chest pain, palpitations, dizziness, lightheadedness.     ALLERGIES:Patient has no known allergies.  PAST MEDICAL HISTORY:   Past Medical History:   Diagnosis Date     Diabetes mellitus, type II, insulin dependent (H)      HTN (hypertension)      Major depression      PAST SURGICAL HISTORY:   has a past surgical history that includes Ankle surgery; IR Lower Extremity Angiogram Left (4/6/2016); and IR Lower Extremity Angiogram Left (2/3/2016).  FAMILY HISTORY: family history includes Asthma in her mother; Cancer - colorectal (age of onset: 52) in her sister; Diabetes in her brother, sister, and sister.  SOCIAL HISTORY: continues to smoke cigarettes and vape. Unsure of how much/often    MEDICATIONS:  Current Outpatient Medications   Medication Sig Dispense Refill     ondansetron (ZOFRAN) 4 MG tablet Take by mouth every 8 hours as needed for nausea.        acetaminophen (TYLENOL) 500 MG tablet Take 1,000 mg by mouth every 6 hours as needed for pain.       albuterol (PROAIR HFA/PROVENTIL HFA/VENTOLIN HFA) 108 (90 Base) MCG/ACT inhaler Inhale 2 puffs into the lungs every 4 hours as needed for shortness of breath, wheezing or cough 18 g 0     atorvastatin (LIPITOR) 40 MG tablet Take 40 mg by mouth daily.       empagliflozin (JARDIANCE) 10 MG TABS tablet Take 10 mg by mouth daily.       gabapentin (NEURONTIN) 250 MG/5ML solution Take 100 mg by mouth 3 times daily.       Lidocaine (LIDOCARE) 4 % Patch Place 1 patch onto the skin every 24 hours. To prevent lidocaine toxicity, patient should be patch free for 12 hrs daily.       melatonin 3 MG tablet Take 3 mg by mouth at bedtime.       mirtazapine (REMERON) 30 MG tablet Take 30 mg by mouth at bedtime.       nicotine (NICODERM CQ) 7 MG/24HR 24 hr patch Place 1 patch onto the skin every 24 hours.       ondansetron (ZOFRAN ODT) 4 MG ODT tab Take 4 mg by mouth every 8 hours as needed for nausea.       oxyCODONE (ROXICODONE) 5 MG tablet Take 1 tablet (5 mg) by mouth daily as needed for pain. 30 tablet 0     polyethylene glycol (MIRALAX) 17 g packet Take 1 packet by mouth daily as needed for constipation.       senna-docusate (SENOKOT-S/PERICOLACE) 8.6-50 MG tablet Take 2 tablets by mouth 2 times daily as needed for constipation.       umeclidinium-vilanterol (ANORO ELLIPTA) 62.5-25 MCG/ACT oral inhaler Inhale 1 puff into the lungs daily.       No current facility-administered medications for this visit.       Case Management:  I have reviewed the care plan and MDS and do agree with the plan. Patient's desire to return to the community is present, but is not able due to care needs . Information reviewed:  Medications, vital signs, orders, and nursing notes.    ROS:  4 point ROS including Respiratory, CV, GI and , other than that noted in the HPI,  is negative    Vitals:  Vital signs:/53   Pulse 65   Temp 98.1  F (36.7  " C)   Resp 16   Ht 1.702 m (5' 7\")   Wt 79.5 kg (175 lb 3.2 oz)   SpO2 96%   BMI 27.44 kg/m     GENERAL APPEARANCE: Well developed, well nourished, in no acute distress.  LUNGS: Lung sounds CTA, no adventitious sounds, respiratory effort normal.  CARD: RRR, S1, S2, without murmurs, gallops, rubs, no JVD, peripheral pulses 2+ and symmetric  ABD: Soft, nondistended and nontender with normal bowel sounds.   MSK: Right sided baseline weakness  EXTREMITIES: No cyanosis, clubbing or edema.  NEURO: Alert and oriented x 3. Right sided facial droop (baseline)  PSYCH: memory and judgement impaired at baseline     Lab/Diagnostic data:   Labs reviewed in EPIC by me.      ASSESSMENT/PLAN:  Hx of Cranioplasty  Hx of CVA  Right sided spasms/pain  Right sided hemiplegia of right dominant side  Headache  Left MCA distribution. S/p decompressive hemicraniectomy due to cerebral edema, and hemorrhagic transformation.  Right sided hemiplegia and right facial droop.   Continue statin  Follow up with Neurology- appt was pushed back per clinic  Continue scheduled tylenol  Continue gabapentin 100 mg TID- consider increasing if tolerating  Continue lidocaine patch 4%  Oxycodone 5 mg once daily PRN- plan to wean      Type II Diabetes  CKD stage 3b  3/2/25A1c 6.7%  Continue Jardiance 10 mg daily  4/2/25 Creat 1.29; eGFR 45     G tube feeding  Morbid obesity  Constipation  Stable, Dietician follows for nutritional and hydration needs.   Adult Formula bolus feeding TID  Continue senna 2 tabs BID  Continue to trend weights  Today 175.2 lbs     Afib  Not on anticoagulation given brain bleeding.   Rates controlled in the 60/80s     HTN  Stable. Well controlled not on BP meds. Bps 100-1teens/70-80s     COPD  Discussed importance of no smoking in facility  Dc'd Nicotine patch due to pt found smoking several times  Continue Anoro Ellipta     Electronically signed by:  CINDY Hodges CNP on 4/17/2025 at 9:53 AM "           Sincerely,        Anny Camargo, APRN CNP    Electronically signed

## 2025-04-17 RX ORDER — ONDANSETRON 4 MG/1
TABLET, FILM COATED ORAL EVERY 8 HOURS PRN
COMMUNITY

## 2025-05-20 ENCOUNTER — TELEPHONE (OUTPATIENT)
Dept: GERIATRICS | Facility: CLINIC | Age: 70
End: 2025-05-20
Payer: COMMERCIAL

## 2025-05-20 NOTE — TELEPHONE ENCOUNTER
Nursing called as Roya requesting stronger pain medication.  They have given her what they could.  This is not a new request from her.    Decided to pass this to the regular Provider as she would know her best and that it is 740am.   Primary Provider should be on soon, but will pass this note on to her.     CINDY Sol CNP

## 2025-05-20 NOTE — TELEPHONE ENCOUNTER
[facility]   May 20, 2025     Name: Roya Burgos   : 1955     Background:  Pain     Orders:  Discussed with nurse manager. No need to call facility. Pt pain resolved after PRNs and scheduled given with no additional medications provided. Encouraged staff to ensure PRNs are given prior to reaching out to provider for additional medications in the future.     Electronically signed by CINDY Hodges CNP

## 2025-05-21 DIAGNOSIS — I69.398 SPASTICITY AS LATE EFFECT OF CEREBROVASCULAR ACCIDENT (CVA): ICD-10-CM

## 2025-05-21 DIAGNOSIS — R25.2 SPASTICITY AS LATE EFFECT OF CEREBROVASCULAR ACCIDENT (CVA): ICD-10-CM

## 2025-05-21 RX ORDER — OXYCODONE HYDROCHLORIDE 5 MG/1
5 TABLET ORAL
Qty: 60 TABLET | Refills: 0 | Status: SHIPPED | OUTPATIENT
Start: 2025-05-21

## 2025-05-25 ENCOUNTER — TELEPHONE (OUTPATIENT)
Dept: GERIATRICS | Facility: CLINIC | Age: 70
End: 2025-05-25
Payer: COMMERCIAL

## 2025-05-25 NOTE — TELEPHONE ENCOUNTER
Roya Burgos is a 70 year old  (1955), Nurse called today to report: Nurse called to report that patient's daughter wanted her to sent to the ER due to possible UTI.  Per her daughter she was more confused when speaking to her this morning nourished tried to explain to the daughter that we could test her for a UTI in the facility and treat her if needed however she was adamant that she be sent to [facility]     electronically signed by:   America Moseley, CNP

## 2025-05-26 ENCOUNTER — TELEPHONE (OUTPATIENT)
Dept: GERIATRICS | Facility: CLINIC | Age: 70
End: 2025-05-26
Payer: COMMERCIAL

## 2025-05-28 ENCOUNTER — NURSING HOME VISIT (OUTPATIENT)
Dept: GERIATRICS | Facility: CLINIC | Age: 70
End: 2025-05-28
Payer: COMMERCIAL

## 2025-05-28 VITALS
RESPIRATION RATE: 17 BRPM | HEIGHT: 67 IN | DIASTOLIC BLOOD PRESSURE: 77 MMHG | OXYGEN SATURATION: 99 % | BODY MASS INDEX: 28.25 KG/M2 | SYSTOLIC BLOOD PRESSURE: 118 MMHG | TEMPERATURE: 98 F | WEIGHT: 180 LBS | HEART RATE: 68 BPM

## 2025-05-28 DIAGNOSIS — Z98.890 HISTORY OF CRANIOPLASTY: ICD-10-CM

## 2025-05-28 DIAGNOSIS — I69.351 FLACCID HEMIPLEGIA OF RIGHT DOMINANT SIDE AS LATE EFFECT OF CEREBRAL INFARCTION (H): ICD-10-CM

## 2025-05-28 DIAGNOSIS — M79.621 PAIN OF RIGHT UPPER ARM: ICD-10-CM

## 2025-05-28 DIAGNOSIS — Z86.73 HISTORY OF CVA (CEREBROVASCULAR ACCIDENT): ICD-10-CM

## 2025-05-28 DIAGNOSIS — R41.0 CONFUSION: Primary | ICD-10-CM

## 2025-05-28 PROCEDURE — 99310 SBSQ NF CARE HIGH MDM 45: CPT

## 2025-05-28 NOTE — LETTER
" 5/28/2025      Roya Burgos  C/o Miles Burgos  [address] [facility] [address]  [facility] [address]        M [facility] GERIATRICS    PRIMARY CARE PROVIDER AND CLINIC:  CINDY Hodges [facility], [address] [facility] [address] / [place] [address]  Chief Complaint   Patient presents with     Hospital F/U      [place] Medical Record Number:  [id number]  Place of Service where encounter took place:  [facility] () [[id number]]    Roya Burgos  is a 70 year old  (1955), recently evaluated in the ED at [facility] on 5/27.    HPI:    Patient's family was adamant to have pt evaluated in the ED as they felt she seemed \"more confused than normal over the phone.\" In the ED was found to have a contaminated urine analysis and started on oral abx. Due to culture result not convincing of active infection and no residual confusion, abx have been discontinued today. Today she denies fever, chills, pain with urination, suprapubic pain, flank pain. She continues to have chronic right sided pain where she has residual weakness from her stroke. Appetite stable. Mood stable. Having daily bowel movements.  Denies shortness of breath, chest pain, palpitations, dizziness, lightheadedness.      CODE STATUS/ADVANCE DIRECTIVES DISCUSSION:  No CPR- Do NOT Intubate    ALLERGIES: No Known Allergies   PAST MEDICAL HISTORY:   Past Medical History:   Diagnosis Date     Diabetes mellitus, type II, insulin dependent (H)      HTN (hypertension)      Major depression       PAST SURGICAL HISTORY:   has a past surgical history that includes Ankle surgery; IR Lower Extremity Angiogram Left (4/6/2016); and IR Lower Extremity Angiogram Left (2/3/2016).  FAMILY HISTORY: family history includes Asthma in her mother; Cancer - colorectal (age of onset: 52) in her sister; Diabetes in her brother, sister, and sister.  SOCIAL HISTORY:   reports that she has quit smoking. Her smoking use included cigarettes. She does not have any " smokeless tobacco history on file. She reports that she does not drink alcohol and does not use drugs.  Patient's living condition: lives in a skilled nursing facility    Current Outpatient Medications   Medication Sig Dispense Refill     acetaminophen (TYLENOL) 500 MG tablet Take 1,000 mg by mouth every 6 hours as needed for pain.       albuterol (PROAIR HFA/PROVENTIL HFA/VENTOLIN HFA) 108 (90 Base) MCG/ACT inhaler Inhale 2 puffs into the lungs every 4 hours as needed for shortness of breath, wheezing or cough 18 g 0     atorvastatin (LIPITOR) 40 MG tablet Take 40 mg by mouth daily.       empagliflozin (JARDIANCE) 10 MG TABS tablet Take 10 mg by mouth daily.       gabapentin (NEURONTIN) 250 MG/5ML solution Take 100 mg by mouth 3 times daily.       Lidocaine (LIDOCARE) 4 % Patch Place 1 patch onto the skin every 24 hours. To prevent lidocaine toxicity, patient should be patch free for 12 hrs daily.       melatonin 3 MG tablet Take 3 mg by mouth at bedtime.       mirtazapine (REMERON) 30 MG tablet Take 30 mg by mouth at bedtime.       nicotine (NICODERM CQ) 7 MG/24HR 24 hr patch Place 1 patch onto the skin every 24 hours.       ondansetron (ZOFRAN ODT) 4 MG ODT tab Take 4 mg by mouth every 8 hours as needed for nausea.       ondansetron (ZOFRAN) 4 MG tablet Take by mouth every 8 hours as needed for nausea.       oxyCODONE (ROXICODONE) 5 MG tablet TAKE 1 TABLET BY MOUTH ONCE DAILY AS NEEDED 60 tablet 0     polyethylene glycol (MIRALAX) 17 g packet Take 1 packet by mouth daily as needed for constipation.       senna-docusate (SENOKOT-S/PERICOLACE) 8.6-50 MG tablet Take 2 tablets by mouth 2 times daily as needed for constipation.       tiZANidine (ZANAFLEX) 2 MG tablet Take 2 mg by mouth every 8 hours.       umeclidinium-vilanterol (ANORO ELLIPTA) 62.5-25 MCG/ACT oral inhaler Inhale 1 puff into the lungs daily.       No current facility-administered medications for this visit.      ROS:  4 point ROS including  "Respiratory, CV, GI and , other than that noted in the HPI,  is negative    Objective:  Vital signs:/77   Pulse 68   Temp 98  F (36.7  C)   Resp 17   Ht 1.702 m (5' 7\")   Wt 81.6 kg (180 lb)   SpO2 99%   BMI 28.19 kg/m     GENERAL APPEARANCE: Elderly female, in no acute distress.  LUNGS: Lung sounds CTA, no adventitious sounds, respiratory effort normal.  CARD: RRR, S1, S2, without murmurs, gallops, rubs, no JVD, peripheral pulses 2+ and symmetric  ABD: Soft, nondistended and nontender with normal bowel sounds.   MSK: Right sided residual weakness upper and lower extremity  EXTREMITIES: No cyanosis, clubbing or edema.  NEURO: Alert with cognitive impairment  PSYCH: memory and judgement impaired at baseline    Lab/Diagnostic data:  Recent labs in [facility] reviewed by me today.     ASSESSMENT/PLAN:  Confusion  Family felt pt had increased confusion  Evaluated in the ED per family request, given dirty UA, was started on oral abx. Today culture resulted <10,000 mixed urogenital aly, thus we will discontinue oral abx    Left MCA distribution S/p decompressive hemicraniectomy due to cerebral edema, and hemorrhagic transformation  Right sided hemiplegia and right facial droop  Pain of right upper arm   Continue statin  NSG follow up 5/27, right sided pain was noted in documentation, however no new orders placed  Continue scheduled tylenol  Continue gabapentin 100 mg TID- consider increasing if tolerating  Continue lidocaine patch 4%  Oxycodone 5 mg once daily PRN- plan to wean   Tizanidine 2 mg q8h PRN  Recommend Neurology consult to address how to further manage right sided pain/spacticity- nursing to assist with scheduling    Orders:  Discontinue oral abx    >45 minutes spent assessing patient, providing education, reviewing records from recent ED visit at [facility], collaborating with nursing, developing plan of care.     Electronically signed by:  CINDY Hodges CNP              "       Sincerely,        Anny Camargo, APRN CNP    Electronically signed

## 2025-05-28 NOTE — PROGRESS NOTES
"[facility] GERIATRICS    PRIMARY CARE PROVIDER AND CLINIC:  CINDY Hodges CNP, [address] [facility] [address] / [place] [address]  Chief Complaint   Patient presents with    Hospital F/U      [place] Medical Record Number:  [id number]  Place of Service where encounter took place:  [facility] () [[id number]]    Roya Burgos  is a 70 year old  (1955), recently evaluated in the ED at  on 5/27.    HPI:    Patient's family was adamant to have pt evaluated in the ED as they felt she seemed \"more confused than normal over the phone.\" In the ED was found to have a contaminated urine analysis and started on oral abx. Due to culture result not convincing of active infection and no residual confusion, abx have been discontinued today. Today she denies fever, chills, pain with urination, suprapubic pain, flank pain. She continues to have chronic right sided pain where she has residual weakness from her stroke. Appetite stable. Mood stable. Having daily bowel movements.  Denies shortness of breath, chest pain, palpitations, dizziness, lightheadedness.      CODE STATUS/ADVANCE DIRECTIVES DISCUSSION:  No CPR- Do NOT Intubate    ALLERGIES: No Known Allergies   PAST MEDICAL HISTORY:   Past Medical History:   Diagnosis Date    Diabetes mellitus, type II, insulin dependent (H)     HTN (hypertension)     Major depression       PAST SURGICAL HISTORY:   has a past surgical history that includes Ankle surgery; IR Lower Extremity Angiogram Left (4/6/2016); and IR Lower Extremity Angiogram Left (2/3/2016).  FAMILY HISTORY: family history includes Asthma in her mother; Cancer - colorectal (age of onset: 52) in her sister; Diabetes in her brother, sister, and sister.  SOCIAL HISTORY:   reports that she has quit smoking. Her smoking use included cigarettes. She does not have any smokeless tobacco history on file. She reports that she does not drink alcohol and does not use drugs.  Patient's " living condition: lives in a skilled nursing facility    Current Outpatient Medications   Medication Sig Dispense Refill    acetaminophen (TYLENOL) 500 MG tablet Take 1,000 mg by mouth every 6 hours as needed for pain.      albuterol (PROAIR HFA/PROVENTIL HFA/VENTOLIN HFA) 108 (90 Base) MCG/ACT inhaler Inhale 2 puffs into the lungs every 4 hours as needed for shortness of breath, wheezing or cough 18 g 0    atorvastatin (LIPITOR) 40 MG tablet Take 40 mg by mouth daily.      empagliflozin (JARDIANCE) 10 MG TABS tablet Take 10 mg by mouth daily.      gabapentin (NEURONTIN) 250 MG/5ML solution Take 100 mg by mouth 3 times daily.      Lidocaine (LIDOCARE) 4 % Patch Place 1 patch onto the skin every 24 hours. To prevent lidocaine toxicity, patient should be patch free for 12 hrs daily.      melatonin 3 MG tablet Take 3 mg by mouth at bedtime.      mirtazapine (REMERON) 30 MG tablet Take 30 mg by mouth at bedtime.      nicotine (NICODERM CQ) 7 MG/24HR 24 hr patch Place 1 patch onto the skin every 24 hours.      ondansetron (ZOFRAN ODT) 4 MG ODT tab Take 4 mg by mouth every 8 hours as needed for nausea.      ondansetron (ZOFRAN) 4 MG tablet Take by mouth every 8 hours as needed for nausea.      oxyCODONE (ROXICODONE) 5 MG tablet TAKE 1 TABLET BY MOUTH ONCE DAILY AS NEEDED 60 tablet 0    polyethylene glycol (MIRALAX) 17 g packet Take 1 packet by mouth daily as needed for constipation.      senna-docusate (SENOKOT-S/PERICOLACE) 8.6-50 MG tablet Take 2 tablets by mouth 2 times daily as needed for constipation.      tiZANidine (ZANAFLEX) 2 MG tablet Take 2 mg by mouth every 8 hours.      umeclidinium-vilanterol (ANORO ELLIPTA) 62.5-25 MCG/ACT oral inhaler Inhale 1 puff into the lungs daily.       No current facility-administered medications for this visit.      ROS:  4 point ROS including Respiratory, CV, GI and , other than that noted in the HPI,  is negative    Objective:  Vital signs:/77   Pulse 68   Temp 98  F  "(36.7  C)   Resp 17   Ht 1.702 m (5' 7\")   Wt 81.6 kg (180 lb)   SpO2 99%   BMI 28.19 kg/m     GENERAL APPEARANCE: Elderly female, in no acute distress.  LUNGS: Lung sounds CTA, no adventitious sounds, respiratory effort normal.  CARD: RRR, S1, S2, without murmurs, gallops, rubs, no JVD, peripheral pulses 2+ and symmetric  ABD: Soft, nondistended and nontender with normal bowel sounds.   MSK: Right sided residual weakness upper and lower extremity  EXTREMITIES: No cyanosis, clubbing or edema.  NEURO: Alert with cognitive impairment  PSYCH: memory and judgement impaired at baseline    Lab/Diagnostic data:  Recent labs in [facility] reviewed by me today.     ASSESSMENT/PLAN:  Confusion  Family felt pt had increased confusion  Evaluated in the ED per family request, given dirty UA, was started on oral abx. Today culture resulted <10,000 mixed urogenital aly, thus we will discontinue oral abx    Left MCA distribution S/p decompressive hemicraniectomy due to cerebral edema, and hemorrhagic transformation  Right sided hemiplegia and right facial droop  Pain of right upper arm   Continue statin  NSG follow up 5/27, right sided pain was noted in documentation, however no new orders placed  Continue scheduled tylenol  Continue gabapentin 100 mg TID- consider increasing if tolerating  Continue lidocaine patch 4%  Oxycodone 5 mg once daily PRN- plan to wean   Tizanidine 2 mg q8h PRN  Recommend Neurology consult to address how to further manage right sided pain/spacticity- nursing to assist with scheduling    Orders:  Discontinue oral abx    >45 minutes spent assessing patient, providing education, reviewing records from recent ED visit at [facility], collaborating with nursing, developing plan of care.     Electronically signed by:  CINDY Hodges CNP                  "

## 2025-06-04 ENCOUNTER — LAB REQUISITION (OUTPATIENT)
Dept: LAB | Facility: CLINIC | Age: 70
End: 2025-06-04
Payer: COMMERCIAL

## 2025-06-04 ENCOUNTER — TELEPHONE (OUTPATIENT)
Dept: GERIATRICS | Facility: CLINIC | Age: 70
End: 2025-06-04

## 2025-06-04 ENCOUNTER — NURSING HOME VISIT (OUTPATIENT)
Dept: GERIATRICS | Facility: CLINIC | Age: 70
End: 2025-06-04
Payer: COMMERCIAL

## 2025-06-04 VITALS
OXYGEN SATURATION: 97 % | WEIGHT: 183.6 LBS | DIASTOLIC BLOOD PRESSURE: 80 MMHG | TEMPERATURE: 98.3 F | SYSTOLIC BLOOD PRESSURE: 132 MMHG | BODY MASS INDEX: 28.82 KG/M2 | HEART RATE: 70 BPM | RESPIRATION RATE: 16 BRPM | HEIGHT: 67 IN

## 2025-06-04 DIAGNOSIS — F51.01 PRIMARY INSOMNIA: ICD-10-CM

## 2025-06-04 DIAGNOSIS — K59.01 SLOW TRANSIT CONSTIPATION: ICD-10-CM

## 2025-06-04 DIAGNOSIS — B37.41 YEAST CYSTITIS: Primary | ICD-10-CM

## 2025-06-04 DIAGNOSIS — Z98.890 HISTORY OF CRANIOPLASTY: ICD-10-CM

## 2025-06-04 DIAGNOSIS — N18.31 CHRONIC KIDNEY DISEASE, STAGE 3A (H): ICD-10-CM

## 2025-06-04 DIAGNOSIS — E04.1 THYROID NODULE: ICD-10-CM

## 2025-06-04 DIAGNOSIS — I69.351 FLACCID HEMIPLEGIA OF RIGHT DOMINANT SIDE AS LATE EFFECT OF CEREBRAL INFARCTION (H): ICD-10-CM

## 2025-06-04 DIAGNOSIS — R25.2 SPASTICITY AS LATE EFFECT OF CEREBROVASCULAR ACCIDENT (CVA): ICD-10-CM

## 2025-06-04 DIAGNOSIS — M79.621 PAIN OF RIGHT UPPER ARM: ICD-10-CM

## 2025-06-04 DIAGNOSIS — I69.398 SPASTICITY AS LATE EFFECT OF CEREBROVASCULAR ACCIDENT (CVA): ICD-10-CM

## 2025-06-04 DIAGNOSIS — Z86.73 HISTORY OF CVA (CEREBROVASCULAR ACCIDENT): ICD-10-CM

## 2025-06-04 DIAGNOSIS — N18.31 STAGE 3A CHRONIC KIDNEY DISEASE (H): ICD-10-CM

## 2025-06-04 NOTE — PROGRESS NOTES
"[facility] GERIATRICS    PRIMARY CARE PROVIDER AND CLINIC:  CINDY Hodges CNP, [address] [facility] / [place] [address]  Chief Complaint   Patient presents with    Hospital F/U      [place] Medical Record Number:  [id number]  Place of Service where encounter took place:  [facility] () [[id number]]    Roya Burgos  is a 70 year old  (1955), admitted to the above facility from  [facility] . Hospital stay 5/30/2025 through 6/2/2025.. PMHx includes wheelchair dependence, T2DM, HLD, paroxysmal AF (not on AC), tobacco use disorder, , HLD, and hx of ischemic MCA stroke s/p decompressive hemicraniotomy 2/2 cerebral edema c/b hemorrhagic transformation c/b aphasia, oropharyngeal oysphagia s/p G-tube on Chronic TF's     HPI:    Presented to hospital with worsening right sided pain and lethargy. Concern for seizure, however pt declined EEG. MRI was not obtained as patient improved and right sided pain resolved. Upon admission, required supplemental oxygen, by day 2 was no longer on supplemental oxygen. Unclear etiology. Urine was negative for bacterial grown. Originally concern for bladder fungal ball, however Urology did not have concerns for this. Recommended keeping catheter for 1 week. Found to have high stool burden. Large BM achieved. CT neck found incidental thyroid nodules. Recommend outpt ultrasound.    Today she states pain is \"minimal, 8/10.\" Her main complaint is difficulty sleeping. She would like to try something as she is \"up all night.\" Reminded her of keeping the Tv off and avoiding use of devices prior to bedtime. Denies constipation. Denies nausea. She has been eating well. Tolerating tube feeds. Denies shortness of breath, chest pain, palpitations, dizziness, lightheadedness.    CODE STATUS/ADVANCE DIRECTIVES DISCUSSION:  No CPR- Do NOT Intubate  DNR / DNI  ALLERGIES: No Known Allergies   PAST MEDICAL HISTORY:   Past Medical History:   Diagnosis Date "    Diabetes mellitus, type II, insulin dependent (H)     HTN (hypertension)     Major depression       PAST SURGICAL HISTORY:   has a past surgical history that includes Ankle surgery; IR Lower Extremity Angiogram Left (4/6/2016); and IR Lower Extremity Angiogram Left (2/3/2016).  FAMILY HISTORY: family history includes Asthma in her mother; Cancer - colorectal (age of onset: 52) in her sister; Diabetes in her brother, sister, and sister.  SOCIAL HISTORY:   reports that she has quit smoking. Her smoking use included cigarettes. She does not have any smokeless tobacco history on file. She reports that she does not drink alcohol and does not use drugs.  Patient's living condition: lives in a skilled nursing facility    Current Outpatient Medications   Medication Sig Dispense Refill    acetaminophen (TYLENOL) 500 MG tablet Take 1,000 mg by mouth every 6 hours as needed for pain.      albuterol (PROAIR HFA/PROVENTIL HFA/VENTOLIN HFA) 108 (90 Base) MCG/ACT inhaler Inhale 2 puffs into the lungs every 4 hours as needed for shortness of breath, wheezing or cough 18 g 0    atorvastatin (LIPITOR) 40 MG tablet Take 40 mg by mouth daily.      empagliflozin (JARDIANCE) 10 MG TABS tablet Take 10 mg by mouth daily.      fluconazole (DIFLUCAN) 200 MG tablet Take 200 mg by mouth daily.      Lidocaine (LIDOCARE) 4 % Patch Place 1 patch onto the skin every 24 hours. To prevent lidocaine toxicity, patient should be patch free for 12 hrs daily.      melatonin 3 MG tablet Take 3 mg by mouth at bedtime.      mirtazapine (REMERON) 30 MG tablet Take 30 mg by mouth at bedtime.      nicotine (NICODERM CQ) 7 MG/24HR 24 hr patch Place 1 patch onto the skin every 24 hours.      ondansetron (ZOFRAN) 4 MG tablet Take by mouth every 8 hours as needed for nausea.      oxyCODONE (ROXICODONE) 5 MG tablet Take 5 mg by mouth daily as needed for severe pain.      polyethylene glycol (MIRALAX) 17 g packet Take 1 packet by mouth daily as needed for  "constipation.      traZODone (DESYREL) 50 MG tablet Take 25 mg by mouth at bedtime.      gabapentin (NEURONTIN) 250 MG/5ML solution Take 2 mLs (100 mg) by mouth 2 times daily.      senna-docusate (SENOKOT-S/PERICOLACE) 8.6-50 MG tablet Take 2 tablets by mouth 2 times daily as needed for constipation.      tiotropium-olodaterol 2.5-2.5 MCG/ACT AERS Inhale 2 puffs into the lungs daily.      tiZANidine (ZANAFLEX) 2 MG tablet Take 2 mg by mouth every 8 hours.       No current facility-administered medications for this visit.      ROS:  4 point ROS including Respiratory, CV, GI and , other than that noted in the HPI,  is negative    Vitals:  Vital signs:/80   Pulse 70   Temp 98.3  F (36.8  C)   Resp 16   Ht 1.702 m (5' 7\")   Wt 83.3 kg (183 lb 9.6 oz)   SpO2 97%   BMI 28.76 kg/m     GENERAL APPEARANCE: Elderly female in NAD  LUNGS: Lung sounds CTA, no adventitious sounds, respiratory effort normal.  CARD: RRR, S1, S2, without murmurs, gallops, rubs, no JVD, peripheral pulses 2+ and symmetric   ABD: Soft, nondistended and nontender with normal bowel sounds.   MSK: Muscle strength and tone were equal bilaterally. Moves all extremities easily and intentionally.   EXTREMITIES: No cyanosis, clubbing or edema.  NEURO: Alert . Right sided facial droop and hemiparesis (baseline)  PSYCH: euthymic     Lab/Diagnostic data:  Recent labs in [facility] reviewed by me today.     ASSESSMENT/PLAN:  Yeast cystitis  CKD stage IIIa  See above for hospital course  Continue fluconazole until 6/6/25  Rhodes to be removed on [address]cre[address] 10  Creat 1.19 on 6/1/25, avoid nephrotoxins  BMP due 6/9/25    Aphasia following CVA  Left MCA distribution S/p decompressive hemicraniectomy due to cerebral edema, and hemorrhagic transformation  Right sided hemiplegia and right facial droop  Pain of right upper arm   Acute encephalopathy resolved, unclear etiology  Reports pain to be controlled today in right arm  Continue scheduled tylenol  Continue " gabapentin 100 mg TID- consider increasing if tolerating  Continue lidocaine patch 4%  Oxycodone 5 mg once daily PRN- plan to wean   Tizanidine 2 mg q8h PRN    Constipation  Continue senna-s and miralax    Thyroid nodule  CT neck showed indeterminate 1.7 cm left thyroid lesion  Outpatient Thyroid US ordered     Insomnia  Continue melatonin  Discussed sleep hygiene  Trazodone 25 mg at HS    Orders:  BMP due 6/9/25  Trazodone 25 mg at HS  Remove do catheter on 6/10/25    >45 minutes spent assessing patient, providing education, reviewing records from recent hospitalization at [facility], collaborating with nursing, developing plan of care.     Electronically signed by:  CINDY Hodges CNP on 6/5/2025 at 3:27 PM

## 2025-06-04 NOTE — LETTER
" 6/4/2025      Roya Burgos  C/o Miles Burgos  1994 [facility] [address]  [facility] [address]        [facility] GERIATRICS    PRIMARY CARE PROVIDER AND CLINIC:  CINDY Hodges [facility], [address] / [place] [address]  Chief Complaint   Patient presents with     Hospital F/U      [place] Medical Record Number:  [id number]  Place of Service where encounter took place:  [facility] () [[id number]]    Roya Burgos  is a 70 year old  (1955), admitted to the above facility from  [facility] . Hospital stay 5/30/2025 through 6/2/2025.. PMHx includes wheelchair dependence, T2DM, HLD, paroxysmal AF (not on AC), tobacco use disorder, , HLD, and hx of ischemic MCA stroke s/p decompressive hemicraniotomy 2/2 cerebral edema c/b hemorrhagic transformation c/b aphasia, oropharyngeal oysphagia s/p G-tube on Chronic TF's     HPI:    Presented to hospital with worsening right sided pain and lethargy. Concern for seizure, however pt declined EEG. MRI was not obtained as patient improved and right sided pain resolved. Upon admission, required supplemental oxygen, by day 2 was no longer on supplemental oxygen. Unclear etiology. Urine was negative for bacterial grown. Originally concern for bladder fungal ball, however Urology did not have concerns for this. Recommended keeping catheter for 1 week. Found to have high stool burden. Large BM achieved. CT neck found incidental thyroid nodules. Recommend outpt ultrasound.    Today she states pain is \"minimal, 8/10.\" Her main complaint is difficulty sleeping. She would like to try something as she is \"up all night.\" Reminded her of keeping the Tv off and avoiding use of devices prior to bedtime. Denies constipation. Denies nausea. She has been eating well. Tolerating tube feeds. Denies shortness of breath, chest pain, palpitations, dizziness, lightheadedness.    CODE STATUS/ADVANCE DIRECTIVES DISCUSSION:  No CPR- Do NOT Intubate  " DNR / DNI  ALLERGIES: No Known Allergies   PAST MEDICAL HISTORY:   Past Medical History:   Diagnosis Date     Diabetes mellitus, type II, insulin dependent (H)      HTN (hypertension)      Major depression       PAST SURGICAL HISTORY:   has a past surgical history that includes Ankle surgery; IR Lower Extremity Angiogram Left (4/6/2016); and IR Lower Extremity Angiogram Left (2/3/2016).  FAMILY HISTORY: family history includes Asthma in her mother; Cancer - colorectal (age of onset: 52) in her sister; Diabetes in her brother, sister, and sister.  SOCIAL HISTORY:   reports that she has quit smoking. Her smoking use included cigarettes. She does not have any smokeless tobacco history on file. She reports that she does not drink alcohol and does not use drugs.  Patient's living condition: lives in a skilled nursing facility    Current Outpatient Medications   Medication Sig Dispense Refill     acetaminophen (TYLENOL) 500 MG tablet Take 1,000 mg by mouth every 6 hours as needed for pain.       albuterol (PROAIR HFA/PROVENTIL HFA/VENTOLIN HFA) 108 (90 Base) MCG/ACT inhaler Inhale 2 puffs into the lungs every 4 hours as needed for shortness of breath, wheezing or cough 18 g 0     atorvastatin (LIPITOR) 40 MG tablet Take 40 mg by mouth daily.       empagliflozin (JARDIANCE) 10 MG TABS tablet Take 10 mg by mouth daily.       fluconazole (DIFLUCAN) 200 MG tablet Take 200 mg by mouth daily.       Lidocaine (LIDOCARE) 4 % Patch Place 1 patch onto the skin every 24 hours. To prevent lidocaine toxicity, patient should be patch free for 12 hrs daily.       melatonin 3 MG tablet Take 3 mg by mouth at bedtime.       mirtazapine (REMERON) 30 MG tablet Take 30 mg by mouth at bedtime.       nicotine (NICODERM CQ) 7 MG/24HR 24 hr patch Place 1 patch onto the skin every 24 hours.       ondansetron (ZOFRAN) 4 MG tablet Take by mouth every 8 hours as needed for nausea.       oxyCODONE (ROXICODONE) 5 MG tablet Take 5 mg by mouth daily as  "needed for severe pain.       polyethylene glycol (MIRALAX) 17 g packet Take 1 packet by mouth daily as needed for constipation.       traZODone (DESYREL) 50 MG tablet Take 25 mg by mouth at bedtime.       gabapentin (NEURONTIN) 250 MG/5ML solution Take 2 mLs (100 mg) by mouth 2 times daily.       senna-docusate (SENOKOT-S/PERICOLACE) 8.6-50 MG tablet Take 2 tablets by mouth 2 times daily as needed for constipation.       tiotropium-olodaterol 2.5-2.5 MCG/ACT AERS Inhale 2 puffs into the lungs daily.       tiZANidine (ZANAFLEX) 2 MG tablet Take 2 mg by mouth every 8 hours.       No current facility-administered medications for this visit.      ROS:  4 point ROS including Respiratory, CV, GI and , other than that noted in the HPI,  is negative    Vitals:  Vital signs:/80   Pulse 70   Temp 98.3  F (36.8  C)   Resp 16   Ht 1.702 m (5' 7\")   Wt 83.3 kg (183 lb 9.6 oz)   SpO2 97%   BMI 28.76 kg/m     GENERAL APPEARANCE: Elderly female in NAD  LUNGS: Lung sounds CTA, no adventitious sounds, respiratory effort normal.  CARD: RRR, S1, S2, without murmurs, gallops, rubs, no JVD, peripheral pulses 2+ and symmetric   ABD: Soft, nondistended and nontender with normal bowel sounds.   MSK: Muscle strength and tone were equal bilaterally. Moves all extremities easily and intentionally.   EXTREMITIES: No cyanosis, clubbing or edema.  NEURO: Alert . Right sided facial droop and hemiparesis (baseline)  PSYCH: euthymic     Lab/Diagnostic data:  Recent labs in [facility] reviewed by me today.     ASSESSMENT/PLAN:  Yeast cystitis  CKD stage IIIa  See above for hospital course  Continue fluconazole until 6/6/25  Rhodes to be removed on Lucrecia 10  Creat 1.19 on 6/1/25, avoid nephrotoxins  BMP due 6/9/25    Aphasia following CVA  Left MCA distribution S/p decompressive hemicraniectomy due to cerebral edema, and hemorrhagic transformation  Right sided hemiplegia and right facial droop  Pain of right upper arm   Acute encephalopathy " resolved, unclear etiology  Reports pain to be controlled today in right arm  Continue scheduled tylenol  Continue gabapentin 100 mg TID- consider increasing if tolerating  Continue lidocaine patch 4%  Oxycodone 5 mg once daily PRN- plan to wean   Tizanidine 2 mg q8h PRN    Constipation  Continue senna-s and miralax    Thyroid nodule  CT neck showed indeterminate 1.7 cm left thyroid lesion  Outpatient Thyroid US ordered     Insomnia  Continue melatonin  Discussed sleep hygiene  Trazodone 25 mg at HS    Orders:  BMP due 6/9/25  Trazodone 25 mg at HS  Remove do catheter on 6/10/25    >45 minutes spent assessing patient, providing education, reviewing records from recent hospitalization at [facility], collaborating with nursing, developing plan of care.     Electronically signed by:  CINDY Hodges CNP on 6/5/2025 at 3:27 PM                   Sincerely,        CINDY Hodges CNP    Electronically signed

## 2025-06-04 NOTE — TELEPHONE ENCOUNTER
Verbal Order per: CINDY Stacy CNP  Start Stiolto 2.5 mcg 2 puffs per day  Discontinue Anoro Ellipta  Verbal order/direction given to: Suzanne Wolfe RN

## 2025-06-05 ENCOUNTER — LAB REQUISITION (OUTPATIENT)
Dept: LAB | Facility: CLINIC | Age: 70
End: 2025-06-05
Payer: COMMERCIAL

## 2025-06-05 DIAGNOSIS — N18.31 CHRONIC KIDNEY DISEASE, STAGE 3A (H): ICD-10-CM

## 2025-06-05 RX ORDER — GABAPENTIN 250 MG/5ML
100 SOLUTION ORAL 2 TIMES DAILY
COMMUNITY
Start: 2025-06-05

## 2025-06-05 RX ORDER — FLUCONAZOLE 200 MG/1
200 TABLET ORAL DAILY
COMMUNITY

## 2025-06-05 RX ORDER — OXYCODONE HYDROCHLORIDE 5 MG/1
5 TABLET ORAL DAILY PRN
COMMUNITY

## 2025-06-05 RX ORDER — TRAZODONE HYDROCHLORIDE 50 MG/1
25 TABLET ORAL AT BEDTIME
COMMUNITY

## 2025-06-06 ENCOUNTER — RESULTS FOLLOW-UP (OUTPATIENT)
Dept: GERIATRICS | Facility: CLINIC | Age: 70
End: 2025-06-06

## 2025-06-06 LAB
ANION GAP SERPL CALCULATED.3IONS-SCNC: 9 MMOL/L (ref 7–15)
BUN SERPL-MCNC: 32.2 MG/DL (ref 8–23)
CALCIUM SERPL-MCNC: 9.8 MG/DL (ref 8.8–10.4)
CHLORIDE SERPL-SCNC: 107 MMOL/L (ref 98–107)
CREAT SERPL-MCNC: 1.4 MG/DL (ref 0.51–0.95)
EGFRCR SERPLBLD CKD-EPI 2021: 41 ML/MIN/1.73M2
GLUCOSE SERPL-MCNC: 79 MG/DL (ref 70–99)
HCO3 SERPL-SCNC: 25 MMOL/L (ref 22–29)
POTASSIUM SERPL-SCNC: 4.5 MMOL/L (ref 3.4–5.3)
SODIUM SERPL-SCNC: 141 MMOL/L (ref 135–145)

## 2025-06-06 PROCEDURE — P9604 ONE-WAY ALLOW PRORATED TRIP: HCPCS | Mod: ORL

## 2025-06-06 PROCEDURE — 80048 BASIC METABOLIC PNL TOTAL CA: CPT | Mod: ORL

## 2025-06-06 PROCEDURE — 36415 COLL VENOUS BLD VENIPUNCTURE: CPT | Mod: ORL

## 2025-06-07 ENCOUNTER — TELEPHONE (OUTPATIENT)
Dept: GERIATRICS | Facility: CLINIC | Age: 70
End: 2025-06-07
Payer: COMMERCIAL

## 2025-06-07 RX ORDER — GABAPENTIN 250 MG/5ML
100 SOLUTION ORAL 3 TIMES DAILY
COMMUNITY
Start: 2025-06-07

## 2025-06-08 NOTE — TELEPHONE ENCOUNTER
[place] [place]IC [facility] TRIAGE ENCOUNTER    Chief Complaint   Patient presents with    Pain       Roya Burgos is a 70 year old  (1955), Nurse called today to report: staff are reporting that patient continues to complain of pain. Per note from NP on 5/28 consideration of increased gabapentin. This is reasonable to try    ASSESSMENT/PLAN  Increase gabapentin to 100 mg TID  Continue with all other pain relief measures    Electronically signed by:   Abi Vera, NP

## 2025-06-09 LAB
ANION GAP SERPL CALCULATED.3IONS-SCNC: 10 MMOL/L (ref 7–15)
BUN SERPL-MCNC: 31.7 MG/DL (ref 8–23)
CALCIUM SERPL-MCNC: 10.2 MG/DL (ref 8.8–10.4)
CHLORIDE SERPL-SCNC: 102 MMOL/L (ref 98–107)
CREAT SERPL-MCNC: 1.24 MG/DL (ref 0.51–0.95)
EGFRCR SERPLBLD CKD-EPI 2021: 47 ML/MIN/1.73M2
GLUCOSE SERPL-MCNC: 172 MG/DL (ref 70–99)
HCO3 SERPL-SCNC: 26 MMOL/L (ref 22–29)
POTASSIUM SERPL-SCNC: 4.3 MMOL/L (ref 3.4–5.3)
SODIUM SERPL-SCNC: 138 MMOL/L (ref 135–145)

## 2025-06-09 PROCEDURE — 80048 BASIC METABOLIC PNL TOTAL CA: CPT | Mod: ORL

## 2025-06-09 PROCEDURE — P9603 ONE-WAY ALLOW PRORATED MILES: HCPCS | Mod: ORL

## 2025-06-09 PROCEDURE — 36415 COLL VENOUS BLD VENIPUNCTURE: CPT | Mod: ORL

## 2025-06-11 ENCOUNTER — RESULTS FOLLOW-UP (OUTPATIENT)
Dept: GERIATRICS | Facility: CLINIC | Age: 70
End: 2025-06-11

## 2025-06-11 ENCOUNTER — NURSING HOME VISIT (OUTPATIENT)
Dept: GERIATRICS | Facility: CLINIC | Age: 70
End: 2025-06-11
Payer: COMMERCIAL

## 2025-06-11 VITALS
RESPIRATION RATE: 16 BRPM | TEMPERATURE: 97.7 F | BODY MASS INDEX: 29.3 KG/M2 | WEIGHT: 186.7 LBS | HEIGHT: 67 IN | SYSTOLIC BLOOD PRESSURE: 111 MMHG | HEART RATE: 69 BPM | DIASTOLIC BLOOD PRESSURE: 70 MMHG | OXYGEN SATURATION: 97 %

## 2025-06-11 DIAGNOSIS — N18.31 STAGE 3A CHRONIC KIDNEY DISEASE (H): ICD-10-CM

## 2025-06-11 DIAGNOSIS — R25.2 SPASTICITY AS LATE EFFECT OF CEREBROVASCULAR ACCIDENT (CVA): ICD-10-CM

## 2025-06-11 DIAGNOSIS — I69.351 FLACCID HEMIPLEGIA OF RIGHT DOMINANT SIDE AS LATE EFFECT OF CEREBRAL INFARCTION (H): ICD-10-CM

## 2025-06-11 DIAGNOSIS — Z86.73 HISTORY OF CVA (CEREBROVASCULAR ACCIDENT): ICD-10-CM

## 2025-06-11 DIAGNOSIS — B37.41 YEAST CYSTITIS: Primary | ICD-10-CM

## 2025-06-11 DIAGNOSIS — E04.1 THYROID NODULE: ICD-10-CM

## 2025-06-11 DIAGNOSIS — M79.621 PAIN OF RIGHT UPPER ARM: ICD-10-CM

## 2025-06-11 DIAGNOSIS — K59.01 SLOW TRANSIT CONSTIPATION: ICD-10-CM

## 2025-06-11 DIAGNOSIS — I69.398 SPASTICITY AS LATE EFFECT OF CEREBROVASCULAR ACCIDENT (CVA): ICD-10-CM

## 2025-06-11 DIAGNOSIS — Z98.890 HISTORY OF CRANIOPLASTY: ICD-10-CM

## 2025-06-11 DIAGNOSIS — F51.01 PRIMARY INSOMNIA: ICD-10-CM

## 2025-06-11 PROCEDURE — 99309 SBSQ NF CARE MODERATE MDM 30: CPT

## 2025-06-11 NOTE — PROGRESS NOTES
"[facility] GERIATRICS    PRIMARY CARE PROVIDER AND CLINIC:  Anny Camargo, CINDY CNP, [address] [facility] / [place] [address]  Chief Complaint   Patient presents with    RECHECK      [place] Medical Record Number:  [id number]  Place of Service where encounter took place:  [facility] () [[id number]]    Roya Burgos  is a 70 year old  (1955), admitted to the above facility from  [facility] . Hospital stay 5/30/2025 through 6/2/2025.. PMHx includes wheelchair dependence, T2DM, HLD, paroxysmal AF (not on AC), tobacco use disorder, , HLD, and hx of ischemic MCA stroke s/p decompressive hemicraniotomy 2/2 cerebral edema c/b hemorrhagic transformation c/b aphasia, oropharyngeal oysphagia s/p G-tube on Chronic TF's     HPI:    Presented to hospital with worsening right sided pain and lethargy. Concern for seizure, however pt declined EEG. MRI was not obtained as patient improved and right sided pain resolved. Upon admission, required supplemental oxygen, by day 2 was no longer on supplemental oxygen. Unclear etiology. Urine was negative for bacterial grown. Originally concern for bladder fungal ball, however Urology did not have concerns for this. Recommended keeping catheter for 1 week. Found to have high stool burden. Large BM achieved. CT neck found incidental thyroid nodules. Recommend outpt ultrasound.    Over the weekend nursing called the on call NP due to complaints of pain after use of PRN oxycodone. It is documented in facility EMR that pt refused her PRN tizanidine. On call NP increased gabapentin to 100 mg TID. Today Joanna states pain is \"okay.\" She states it's a 6/10 on her right sided extremities. She continues to not sleep as well as she would like to. She does not have additional complaints today. Her urinary catheter was removed yesterday and she is voiding well with minimal residual post void. Reports she is going to the dentist. Appetite stable. Mood stable. " Having regular bowel movements. Denies urinary symptoms including dysuria or hematuria. Denies shortness of breath, chest pain, palpitations, dizziness, lightheadedness, dizziness.      CODE STATUS/ADVANCE DIRECTIVES DISCUSSION:  No CPR- Do NOT Intubate  DNR / DNI  ALLERGIES: No Known Allergies   PAST MEDICAL HISTORY:   Past Medical History:   Diagnosis Date    Diabetes mellitus, type II, insulin dependent (H)     HTN (hypertension)     Major depression       PAST SURGICAL HISTORY:   has a past surgical history that includes Ankle surgery; IR Lower Extremity Angiogram Left (4/6/2016); and IR Lower Extremity Angiogram Left (2/3/2016).  FAMILY HISTORY: family history includes Asthma in her mother; Cancer - colorectal (age of onset: 52) in her sister; Diabetes in her brother, sister, and sister.  SOCIAL HISTORY:   reports that she has quit smoking. Her smoking use included cigarettes. She does not have any smokeless tobacco history on file. She reports that she does not drink alcohol and does not use drugs.  Patient's living condition: lives in a skilled nursing facility    Current Outpatient Medications   Medication Sig Dispense Refill    acetaminophen (TYLENOL) 500 MG tablet Take 1,000 mg by mouth every 6 hours as needed for pain.      albuterol (PROAIR HFA/PROVENTIL HFA/VENTOLIN HFA) 108 (90 Base) MCG/ACT inhaler Inhale 2 puffs into the lungs every 4 hours as needed for shortness of breath, wheezing or cough 18 g 0    atorvastatin (LIPITOR) 40 MG tablet Take 40 mg by mouth daily.      empagliflozin (JARDIANCE) 10 MG TABS tablet Take 10 mg by mouth daily.      fluconazole (DIFLUCAN) 200 MG tablet Take 200 mg by mouth daily.      gabapentin (NEURONTIN) 250 MG/5ML solution Take 2 mLs (100 mg) by mouth 3 times daily.      Lidocaine (LIDOCARE) 4 % Patch Place 1 patch onto the skin every 24 hours. To prevent lidocaine toxicity, patient should be patch free for 12 hrs daily.      melatonin 3 MG tablet Take 3 mg by mouth at  "bedtime.      mirtazapine (REMERON) 30 MG tablet Take 30 mg by mouth at bedtime.      nicotine (NICODERM CQ) 7 MG/24HR 24 hr patch Place 1 patch onto the skin every 24 hours.      ondansetron (ZOFRAN) 4 MG tablet Take by mouth every 8 hours as needed for nausea.      oxyCODONE (ROXICODONE) 5 MG tablet Take 5 mg by mouth daily as needed for severe pain.      polyethylene glycol (MIRALAX) 17 g packet Take 1 packet by mouth daily as needed for constipation.      senna-docusate (SENOKOT-S/PERICOLACE) 8.6-50 MG tablet Take 2 tablets by mouth 2 times daily as needed for constipation.      tiotropium-olodaterol 2.5-2.5 MCG/ACT AERS Inhale 2 puffs into the lungs daily.      tiZANidine (ZANAFLEX) 2 MG tablet Take 2 mg by mouth every 8 hours.      traZODone (DESYREL) 50 MG tablet Take 25 mg by mouth at bedtime.       No current facility-administered medications for this visit.      ROS:  Patient denies fever, chills, headache, lightheadedness, dizziness, rhinorrhea, cough, congestion, shortness of breath, chest pain, palpitations, abdominal pain, n/v, diarrhea, constipation, change in appetite, dysuria, frequency, burning or pain with urination.  Other than stated in HPI all other review of systems is negative.      Objective:  Vital signs:/70   Pulse 69   Temp 97.7  F (36.5  C)   Resp 16   Ht 1.702 m (5' 7\")   Wt 84.7 kg (186 lb 11.2 oz)   SpO2 97%   BMI 29.24 kg/m     GENERAL APPEARANCE: Well developed, well nourished, in no acute distress.Baseline right sided facial droop.   LUNGS: Lung sounds CTA, no adventitious sounds, respiratory effort normal.  CARD: RRR, S1, S2, holosystolic murmur present  ABD: Soft, nondistended and nontender with normal bowel sounds.   MSK: Right sided residual weakness at baseline  EXTREMITIES: No cyanosis, clubbing or edema.  NEURO: Alert with cognitive impairment  PSYCH: memory and judgment impaired at baseline     Lab/Diagnostic data:  Labs reviewed in [facility] by me.  "     ASSESSMENT/PLAN:  Yeast cystitis  CKD stage IIIa  Treated with fluconazole  Rhodes catheter removed, voiding well without retention following catheter removal on 6/10  Creat 1.19 on 6/1/25, 1.24 on 6/9  avoid nephrotoxins    Aphasia following CVA  Left MCA distribution S/p decompressive hemicraniectomy due to cerebral edema, and hemorrhagic transformation  Right sided hemiplegia and right facial droop  Pain of right upper arm   Acute encephalopathy resolved, unclear etiology  Reports pain to be controlled today in right arm  Continue scheduled tylenol  Continue gabapentin 100 mg TID  Continue lidocaine patch 4%  Oxycodone 5 mg once daily PRN   Tizanidine 2 mg q8h PRN- discussed with Joanna today to avoid refusing PRN medications  Follow up with Neurology for evaluation of post stroke pain scheduled for October 2025    Constipation  Chronic, stable  Continue senna-s and miralax    Thyroid nodule  CT neck showed indeterminate 1.7 cm left thyroid lesion  Outpatient Thyroid US ordered   Endocrinology appt for 6/12/25    Insomnia  Chronic, tenuous  Continue melatonin  Increase Trazodone to 50 mg at HS    Orders:  Increase trazodone 50 mg     Electronically signed by:  CINDY Hodges CNP on 6/11/2025 at 2:47 PM

## 2025-06-11 NOTE — LETTER
" 6/11/2025      Roya Burgos  C/o Miles Burgos  1994 [facility] [address]  [facility] [address]        [facility] GERIATRICS    PRIMARY CARE PROVIDER AND CLINIC:  CINDY Hodges [facility], [address] [facility] / [place] [address]  Chief Complaint   Patient presents with     RECHECK      [place] Medical Record Number:  [id number]  Place of Service where encounter took place:  [facility] () [[id number]]    Roya Burgos  is a 70 year old  (1955), admitted to the above facility from  [facility] . Hospital stay 5/30/2025 through 6/2/2025.. PMHx includes wheelchair dependence, T2DM, HLD, paroxysmal AF (not on AC), tobacco use disorder, , HLD, and hx of ischemic MCA stroke s/p decompressive hemicraniotomy 2/2 cerebral edema c/b hemorrhagic transformation c/b aphasia, oropharyngeal oysphagia s/p G-tube on Chronic TF's     HPI:    Presented to hospital with worsening right sided pain and lethargy. Concern for seizure, however pt declined EEG. MRI was not obtained as patient improved and right sided pain resolved. Upon admission, required supplemental oxygen, by day 2 was no longer on supplemental oxygen. Unclear etiology. Urine was negative for bacterial grown. Originally concern for bladder fungal ball, however Urology did not have concerns for this. Recommended keeping catheter for 1 week. Found to have high stool burden. Large BM achieved. CT neck found incidental thyroid nodules. Recommend outpt ultrasound.    Over the weekend nursing called the on call NP due to complaints of pain after use of PRN oxycodone. It is documented in facility EMR that pt refused her PRN tizanidine. On call NP increased gabapentin to 100 mg TID. Today Joanna states pain is \"okay.\" She states it's a 6/10 on her right sided extremities. She continues to not sleep as well as she would like to. She does not have additional complaints today. Her urinary catheter was removed yesterday and she is " voiding well with minimal residual post void. Reports she is going to the dentist. Appetite stable. Mood stable. Having regular bowel movements. Denies urinary symptoms including dysuria or hematuria. Denies shortness of breath, chest pain, palpitations, dizziness, lightheadedness, dizziness.      CODE STATUS/ADVANCE DIRECTIVES DISCUSSION:  No CPR- Do NOT Intubate  DNR / DNI  ALLERGIES: No Known Allergies   PAST MEDICAL HISTORY:   Past Medical History:   Diagnosis Date     Diabetes mellitus, type II, insulin dependent (H)      HTN (hypertension)      Major depression       PAST SURGICAL HISTORY:   has a past surgical history that includes Ankle surgery; IR Lower Extremity Angiogram Left (4/6/2016); and IR Lower Extremity Angiogram Left (2/3/2016).  FAMILY HISTORY: family history includes Asthma in her mother; Cancer - colorectal (age of onset: 52) in her sister; Diabetes in her brother, sister, and sister.  SOCIAL HISTORY:   reports that she has quit smoking. Her smoking use included cigarettes. She does not have any smokeless tobacco history on file. She reports that she does not drink alcohol and does not use drugs.  Patient's living condition: lives in a skilled nursing facility    Current Outpatient Medications   Medication Sig Dispense Refill     acetaminophen (TYLENOL) 500 MG tablet Take 1,000 mg by mouth every 6 hours as needed for pain.       albuterol (PROAIR HFA/PROVENTIL HFA/VENTOLIN HFA) 108 (90 Base) MCG/ACT inhaler Inhale 2 puffs into the lungs every 4 hours as needed for shortness of breath, wheezing or cough 18 g 0     atorvastatin (LIPITOR) 40 MG tablet Take 40 mg by mouth daily.       empagliflozin (JARDIANCE) 10 MG TABS tablet Take 10 mg by mouth daily.       fluconazole (DIFLUCAN) 200 MG tablet Take 200 mg by mouth daily.       gabapentin (NEURONTIN) 250 MG/5ML solution Take 2 mLs (100 mg) by mouth 3 times daily.       Lidocaine (LIDOCARE) 4 % Patch Place 1 patch onto the skin every 24 hours. To  "prevent lidocaine toxicity, patient should be patch free for 12 hrs daily.       melatonin 3 MG tablet Take 3 mg by mouth at bedtime.       mirtazapine (REMERON) 30 MG tablet Take 30 mg by mouth at bedtime.       nicotine (NICODERM CQ) 7 MG/24HR 24 hr patch Place 1 patch onto the skin every 24 hours.       ondansetron (ZOFRAN) 4 MG tablet Take by mouth every 8 hours as needed for nausea.       oxyCODONE (ROXICODONE) 5 MG tablet Take 5 mg by mouth daily as needed for severe pain.       polyethylene glycol (MIRALAX) 17 g packet Take 1 packet by mouth daily as needed for constipation.       senna-docusate (SENOKOT-S/PERICOLACE) 8.6-50 MG tablet Take 2 tablets by mouth 2 times daily as needed for constipation.       tiotropium-olodaterol 2.5-2.5 MCG/ACT AERS Inhale 2 puffs into the lungs daily.       tiZANidine (ZANAFLEX) 2 MG tablet Take 2 mg by mouth every 8 hours.       traZODone (DESYREL) 50 MG tablet Take 25 mg by mouth at bedtime.       No current facility-administered medications for this visit.      ROS:  Patient denies fever, chills, headache, lightheadedness, dizziness, rhinorrhea, cough, congestion, shortness of breath, chest pain, palpitations, abdominal pain, n/v, diarrhea, constipation, change in appetite, dysuria, frequency, burning or pain with urination.  Other than stated in HPI all other review of systems is negative.      Objective:  Vital signs:/70   Pulse 69   Temp 97.7  F (36.5  C)   Resp 16   Ht 1.702 m (5' 7\")   Wt 84.7 kg (186 lb 11.2 oz)   SpO2 97%   BMI 29.24 kg/m     GENERAL APPEARANCE: Well developed, well nourished, in no acute distress.Baseline right sided facial droop.   LUNGS: Lung sounds CTA, no adventitious sounds, respiratory effort normal.  CARD: RRR, S1, S2, holosystolic murmur present  ABD: Soft, nondistended and nontender with normal bowel sounds.   MSK: Right sided residual weakness at baseline  EXTREMITIES: No cyanosis, clubbing or edema.  NEURO: Alert with " cognitive impairment  PSYCH: memory and judgment impaired at baseline     Lab/Diagnostic data:  Labs reviewed in EPIC by me.      ASSESSMENT/PLAN:  Yeast cystitis  CKD stage IIIa  Treated with fluconazole  Rhodes catheter removed, voiding well without retention following catheter removal on 6/10  Creat 1.19 on 6/1/25, 1.24 on 6/9  avoid nephrotoxins    Aphasia following CVA  Left MCA distribution S/p decompressive hemicraniectomy due to cerebral edema, and hemorrhagic transformation  Right sided hemiplegia and right facial droop  Pain of right upper arm   Acute encephalopathy resolved, unclear etiology  Reports pain to be controlled today in right arm  Continue scheduled tylenol  Continue gabapentin 100 mg TID  Continue lidocaine patch 4%  Oxycodone 5 mg once daily PRN   Tizanidine 2 mg q8h PRN- discussed with Joanna today to avoid refusing PRN medications  Follow up with Neurology for evaluation of post stroke pain scheduled for October 2025    Constipation  Chronic, stable  Continue senna-s and miralax    Thyroid nodule  CT neck showed indeterminate 1.7 cm left thyroid lesion  Outpatient Thyroid US ordered   Endocrinology appt for 6/12/25    Insomnia  Chronic, tenuous  Continue melatonin  Increase Trazodone to 50 mg at HS    Orders:  Increase trazodone 50 mg     Electronically signed by:  CINDY Hodges CNP on 6/11/2025 at 2:47 PM                       Sincerely,        CINDY Hodges CNP    Electronically signed

## 2025-06-23 ENCOUNTER — NURSING HOME VISIT (OUTPATIENT)
Dept: GERIATRICS | Facility: CLINIC | Age: 70
End: 2025-06-23
Payer: COMMERCIAL

## 2025-06-23 DIAGNOSIS — I48.0 PAROXYSMAL ATRIAL FIBRILLATION (H): ICD-10-CM

## 2025-06-23 DIAGNOSIS — I50.20 HFREF (HEART FAILURE WITH REDUCED EJECTION FRACTION) (H): ICD-10-CM

## 2025-06-23 DIAGNOSIS — Z86.73 HISTORY OF CVA (CEREBROVASCULAR ACCIDENT): Primary | ICD-10-CM

## 2025-06-23 DIAGNOSIS — E11.22 TYPE 2 DIABETES MELLITUS WITH STAGE 3B CHRONIC KIDNEY DISEASE, WITHOUT LONG-TERM CURRENT USE OF INSULIN (H): ICD-10-CM

## 2025-06-23 DIAGNOSIS — I10 ESSENTIAL HYPERTENSION: ICD-10-CM

## 2025-06-23 DIAGNOSIS — N18.32 TYPE 2 DIABETES MELLITUS WITH STAGE 3B CHRONIC KIDNEY DISEASE, WITHOUT LONG-TERM CURRENT USE OF INSULIN (H): ICD-10-CM

## 2025-06-23 PROCEDURE — 99309 SBSQ NF CARE MODERATE MDM 30: CPT | Performed by: FAMILY MEDICINE

## 2025-06-23 NOTE — LETTER
6/23/2025      Roya Burgos  C/o Miles Burgos  [address]  [facility] [address]        No notes on file      Sincerely,        Shari Lerma MD    Electronically signed     Afib  Digoxin levels wnl. Pt w/ afib w/ RVR earlier on 7/17 w/out home meds ordered, otherwise HDS. Will resume home meds  - PTA metoprolol tartrate 100 mg BID, diltiazem 60 mgs Q8h, digoxin 125mcg Daily   - telemetry  - pt not on AC 2/2 hemorrhagic stroke hx     AMS, resolved  Reported to be agitated and not herself. Labs obtained on admission thus far unrevealing (CXR negative, UA neg, digoxin level wnl, CT head neg). VBG w/ no hypercarbia, LA mildly elevated at 2.1, now 1.9 (7/18). Cdiff and enteric panel neg. Covid neg. CRP of 31 on admission and downtrending to 28 on 7/18. Ammonia wnl. Blood cultures pending. On our interview on 7/17, pt is conversant and does not appear altered. It appears that she has returned back to baseline.  -Holding off on antibiotics given lack of evidence of sepsis and lack of clear infectious source  -Neurochecks every 4 hours for the first 48 hours of admission  -Requested a pharmacy consult to review etiologies for acute toxic encephalopathy.              - No changes needed after review of meds.             Recent ischemic left MCA stroke c/b cerebral edema s/p decompressive hemicraniectomy c/b hemorrhagic transformation secondary to therapeutic anticoagulation  Dysarthria   Significant right-sided berhane-paralysis  oropharyngeal dysphagia s/p percutaneous gastrostomy tube on tube feeding  -Will resume aspirin once we ensure there is no active brain bleeding- pt reports not taking, and discharge summaries indicate discontinuation, will attempt verification w/ pharmacy   -Requested dietitian consult to resume tube feeding.  - SPL consulted.              - Initiate pureed (IDDSI 4) diet and thin liquids (7/17) and advanced to IDDSI 5 (7/18) with thin liquids.              - SLP will follow for dysphagia management and trialing advanced solids when pt's dentures are present.  -PTA Atorvastatin.  -The patient will need neurosurgery follow-up in 6 weeks based on documentation from prior  hospital (appears to be scheduled August 2024)     Acute kidney injury, akin stage I on top of CKD stage III, resolved   Cr baseline 1-1.5 (2024) and Cr 1.5 on admission. BUN/Cr ~19, likely prerenal. Now improved on 7/18 with Cr 1.08 and BUN 17.5.  - Strict intake and output and daily body weight  - Resume PTA losartan given stable Cr on 7/19.     Chronic heart failure with mildly reduced ejection fraction LVEF 46%TTE from an outside hospital (6/6/24):  1. Limited echo.  2. Mild LV enlargement with mildly increased wall thickness. Calculated biplane LVEF 46%. Mild global hypokinesis. No LV thrombus.  3. Mild RV enlargement with mildly decreased systolic function. Estimated PASP 25 mmHg + RA Pressure.   -Consider switching metoprolol tartrate to metoprolol succinate prior to discharge for HF   -Consider switching losartan to valsartan once acute kidney injury resolves then eventually initiation of Entresto  - resumed PTA empagliflozin     T2DM, Uncontrolled (7/15/2024 @ 7.4)  Hemoglobin A1c at [facility] was 9.2 on 4/4/2024. Repeat on 7/15 7.4   - resume PTA empagloflozin, lantus 8 units  - Started insulin NovoLog coverage  - Will hold off on metformin in setting of acute kidney injury  - Holding PTA gabapentin 300 mg 3 times daily due to ?sedation on 7/18 AM      Uncontrolled hypertension  - Resume PTA losartan (7/19-xx).  - resume PTA metoprolol as above      COPD not on home oxygen  Breathing comfortably on RA  - resume PTA inhaler (fluticasone vilanterol)  - Bill PRN      Need for placement into a transitional care unit versus long-term care  Daughter Miles on phone and discussed that she is agreeable for placement for patient as she was unable to care for her at home due to her medical needs.   - Case management/social work consulted     Overall stabilized and discharged to TCU on 7/24/2024 for PT, OT, nursing cares, medical management and monitoring.     Following TCU therapies, moved to LTC bed.      Today:  Roya is seen today for regulatory visit. In her room, appears comfortable. Has no new complaints. Persistent dense right sided weakness. She was in the hospital Jan 2025 for cranioplasty following her July 2024 stroke and decompressive craniectomy. No pain. Has right hemiplegia from stroke, baseline. She is non ambulatory. Diabetic on meds, also treated for HTN. Had yeast cystitis, hospitalized recently, now resolved.      PAST MEDICAL HISTORY:  Past Medical History:   Diagnosis Date     Diabetes mellitus, type II, insulin dependent (H)      HTN (hypertension)      Major depression        MEDICATIONS:  Most accurate list exists at facility.   Current Outpatient Medications   Medication Sig Dispense Refill     acetaminophen (TYLENOL) 500 MG tablet Take 1,000 mg by mouth every 6 hours as needed for pain.       albuterol (PROAIR HFA/PROVENTIL HFA/VENTOLIN HFA) 108 (90 Base) MCG/ACT inhaler Inhale 2 puffs into the lungs every 4 hours as needed for shortness of breath, wheezing or cough 18 g 0     atorvastatin (LIPITOR) 40 MG tablet Take 40 mg by mouth daily.       empagliflozin (JARDIANCE) 10 MG TABS tablet Take 10 mg by mouth daily.       fluconazole (DIFLUCAN) 200 MG tablet Take 200 mg by mouth daily.       gabapentin (NEURONTIN) 250 MG/5ML solution Take 2 mLs (100 mg) by mouth 3 times daily.       Lidocaine (LIDOCARE) 4 % Patch Place 1 patch onto the skin every 24 hours. To prevent lidocaine toxicity, patient should be patch free for 12 hrs daily.       melatonin 3 MG tablet Take 3 mg by mouth at bedtime.       mirtazapine (REMERON) 30 MG tablet Take 30 mg by mouth at bedtime.       nicotine (NICODERM CQ) 7 MG/24HR 24 hr patch Place 1 patch onto the skin every 24 hours.       ondansetron (ZOFRAN) 4 MG tablet Take by mouth every 8 hours as needed for nausea.       oxyCODONE (ROXICODONE) 5 MG tablet Take 5 mg by mouth daily as needed for severe pain.       polyethylene glycol (MIRALAX) 17 g packet Take 1  "packet by mouth daily as needed for constipation.       senna-docusate (SENOKOT-S/PERICOLACE) 8.6-50 MG tablet Take 2 tablets by mouth 2 times daily as needed for constipation.       tiotropium-olodaterol 2.5-2.5 MCG/ACT AERS Inhale 2 puffs into the lungs daily.       tiZANidine (ZANAFLEX) 2 MG tablet Take 2 mg by mouth every 8 hours.       traZODone (DESYREL) 50 MG tablet Take 25 mg by mouth at bedtime.          PHYSICAL EXAM:  General: Patient is alert female, no distress.    Vitals: /67   Pulse 71   Temp 98.4  F (36.9  C)   Resp 18   Ht 1.702 m (5' 7\")   Wt 84.4 kg (186 lb 1.6 oz)   SpO2 96%   BMI 29.15 kg/m    HEENT: Eyes show no injection or icterus. Nares negative. Oropharynx well hydrated.  Neck: No JVD.  Lungs: Non labored respirations.   Extremities: No edema.  Musculoskeletal: Degen changes. Right hemiparesis.   Skin: Warm and dry.   Psych: Mood is stable.      LABS/DIAGNOSTIC DATA:  Component      Latest Ref Rng 7/19/2024  11:19 AM 7/31/2024  5:30 AM   WBC      4.0 - 11.0 10e3/uL 10.4  11.4 (H)    RBC Count      3.80 - 5.20 10e6/uL 4.13  4.51    Hemoglobin      11.7 - 15.7 g/dL 10.9 (L)  11.6 (L)    Hematocrit      35.0 - 47.0 % 36.7  39.7    MCV      78 - 100 fL 89  88    MCH      26.5 - 33.0 pg 26.4 (L)  25.7 (L)    MCHC      31.5 - 36.5 g/dL 29.7 (L)  29.2 (L)    RDW      10.0 - 15.0 % 16.0 (H)  17.1 (H)    Platelet Count      150 - 450 10e3/uL 340  333       Component      Latest Ref Rng 7/24/2024  4:12 AM 7/31/2024  5:30 AM   Sodium      135 - 145 mmol/L 138  137    Potassium      3.4 - 5.3 mmol/L 4.5  4.5    Carbon Dioxide (CO2)      22 - 29 mmol/L 25  24    Anion Gap      7 - 15 mmol/L 13  13    Urea Nitrogen      8.0 - 23.0 mg/dL 27.9 (H)  31.4 (H)    Creatinine      0.51 - 0.95 mg/dL 1.11 (H)  1.14 (H)    GFR Estimate      >60 mL/min/1.73m2 54 (L)  53 (L)    Calcium      8.8 - 10.4 mg/dL 9.5  9.7    Chloride      98 - 107 mmol/L 100  100         ASSESSMENT/PLAN:  Stroke. Left MCA " distribution. S/p decompressive hemicraniectomy due to cerebral edema, and hemorrhagic transformation. Resultant right hemiplegia, dysphagia. Had cranioplasty Jan 2025, no complications. Follow up with neurosurgery. Continue statin.   Afib. Not on anticoagulation given brain bleeding.   HTN. Not on BP meds, continue to monitor.   CHF. Reduced EF. Not on diuretics. Follow up with cardiology.  DM. On dapagloflozin. Accuchecks followed.   COPD. Respiratory status is stable.   CKD.       Electronically signed by: Shari Lerma MD       Sincerely,        Shari Lerma MD    Electronically signed

## 2025-06-24 VITALS
BODY MASS INDEX: 29.21 KG/M2 | DIASTOLIC BLOOD PRESSURE: 67 MMHG | WEIGHT: 186.1 LBS | OXYGEN SATURATION: 96 % | RESPIRATION RATE: 18 BRPM | SYSTOLIC BLOOD PRESSURE: 110 MMHG | TEMPERATURE: 98.4 F | HEIGHT: 67 IN | HEART RATE: 71 BPM

## 2025-06-27 ENCOUNTER — PATIENT OUTREACH (OUTPATIENT)
Dept: GERIATRIC MEDICINE | Facility: CLINIC | Age: 70
End: 2025-06-27
Payer: COMMERCIAL

## 2025-06-29 NOTE — PROGRESS NOTES
[facility] GERIATRICS  [facility] Medical Record Number:  [id number]  Place of Service where encounter took place: [facility] () [[id number]]  CODE STATUS:   CPR/Full code     Chief Complaint/Reason for Visit:  Chief Complaint   Patient presents with    [facility] Regulatory     LTC 6/23/2025.        HPI:    Roya Burgos is a 70 year old female with hospitalization July 2024 for stroke as noted below.     [facility]  Hospitalist Discharge Summary    Date of Admission:  7/16/2024  Date of Discharge:  7/24/2024     Hospital Course  Roya Burgos is a 69 year old female admitted on 7/16/2024 for evaluation of AMS. She has PMHx of recent ischemic left MCA stroke c/b cerebral edema s/p decompressive hemicraniectomy c/b hemorrhagic transformation 2/2 therapeutic anticoagulation for atrial fibrillation old complicated by dysarthria and significant right-sided berhane-paralysis, oropharyngeal dysphagia s/p percutaneous gastrostomy tube on tube feeding, chronic migraine headache, paroxysmal atrial fibrillation on aspirin only given brain bleeding, chronic heart failure with mildly reduced ejection fraction, hypertension, type 2 diabetes mellitus, hyperlipidemia, chronic kidney disease stage III A, L parotid gland nodule, Bilateral moderate carotid stenosis, and COPD not on home oxygen.  The patient had frequent hospitalization in [facility] and was recently discharged on 7/14/2024 for care at home with home visiting nurse service, but returned as daughter was unable to care for her at home. Hemodynamically and clinically stable.    Migraine headache  Per chart review, patient has been on percocet (10-325mg TID) since 09/2014, but indications are unclear.  - hold PTA amitriptyline, noted to be sedated 7/18 AM > resume on 7/19  - tylenol PRN     Afib  Digoxin levels wnl. Pt w/ afib w/ RVR earlier on 7/17 w/out home meds ordered, otherwise HDS. Will  resume home meds  - PTA metoprolol tartrate 100 mg BID, diltiazem 60 mgs Q8h, digoxin 125mcg Daily   - telemetry  - pt not on AC 2/2 hemorrhagic stroke hx     AMS, resolved  Reported to be agitated and not herself. Labs obtained on admission thus far unrevealing (CXR negative, UA neg, digoxin level wnl, CT head neg). VBG w/ no hypercarbia, LA mildly elevated at 2.1, now 1.9 (7/18). Cdiff and enteric panel neg. Covid neg. CRP of 31 on admission and downtrending to 28 on 7/18. Ammonia wnl. Blood cultures pending. On our interview on 7/17, pt is conversant and does not appear altered. It appears that she has returned back to baseline.  -Holding off on antibiotics given lack of evidence of sepsis and lack of clear infectious source  -Neurochecks every 4 hours for the first 48 hours of admission  -Requested a pharmacy consult to review etiologies for acute toxic encephalopathy.              - No changes needed after review of meds.             Recent ischemic left MCA stroke c/b cerebral edema s/p decompressive hemicraniectomy c/b hemorrhagic transformation secondary to therapeutic anticoagulation  Dysarthria   Significant right-sided berhane-paralysis  oropharyngeal dysphagia s/p percutaneous gastrostomy tube on tube feeding  -Will resume aspirin once we ensure there is no active brain bleeding- pt reports not taking, and discharge summaries indicate discontinuation, will attempt verification w/ pharmacy   -Requested dietitian consult to resume tube feeding.  - SPL consulted.              - Initiate pureed (IDDSI 4) diet and thin liquids (7/17) and advanced to IDDSI 5 (7/18) with thin liquids.              - SLP will follow for dysphagia management and trialing advanced solids when pt's dentures are present.  -PTA Atorvastatin.  -The patient will need neurosurgery follow-up in 6 weeks based on documentation from prior hospital (appears to be scheduled August 2024)     Acute kidney injury, akin stage I on top of CKD stage  III, resolved   Cr baseline 1-1.5 (2024) and Cr 1.5 on admission. BUN/Cr ~19, likely prerenal. Now improved on 7/18 with Cr 1.08 and BUN 17.5.  - Strict intake and output and daily body weight  - Resume PTA losartan given stable Cr on 7/19.     Chronic heart failure with mildly reduced ejection fraction LVEF 46%TTE from an outside hospital (6/6/24):  1. Limited echo.  2. Mild LV enlargement with mildly increased wall thickness. Calculated biplane LVEF 46%. Mild global hypokinesis. No LV thrombus.  3. Mild RV enlargement with mildly decreased systolic function. Estimated PASP 25 mmHg + RA Pressure.   -Consider switching metoprolol tartrate to metoprolol succinate prior to discharge for HF   -Consider switching losartan to valsartan once acute kidney injury resolves then eventually initiation of Entresto  - resumed PTA empagliflozin     T2DM, Uncontrolled (7/15/2024 @ 7.4)  Hemoglobin A1c at [facility] was 9.2 on 4/4/2024. Repeat on 7/15 7.4   - resume PTA empagloflozin, lantus 8 units  - Started insulin NovoLog coverage  - Will hold off on metformin in setting of acute kidney injury  - Holding PTA gabapentin 300 mg 3 times daily due to ?sedation on 7/18 AM      Uncontrolled hypertension  - Resume PTA losartan (7/19-xx).  - resume PTA metoprolol as above      COPD not on home oxygen  Breathing comfortably on RA  - resume PTA inhaler (fluticasone vilanterol)  - Bill PRN      Need for placement into a transitional care unit versus long-term care  Daughter Miles on phone and discussed that she is agreeable for placement for patient as she was unable to care for her at home due to her medical needs.   - Case management/social work consulted     Overall stabilized and discharged to TCU on 7/24/2024 for PT, OT, nursing cares, medical management and monitoring.     Following TCU therapies, moved to LTC bed.     Today:  Roya is seen today for regulatory visit. In her room, appears comfortable. Has no new complaints.  Persistent dense right sided weakness. She was in the hospital Jan 2025 for cranioplasty following her July 2024 stroke and decompressive craniectomy. No pain. Has right hemiplegia from stroke, baseline. She is non ambulatory. Diabetic on meds, also treated for HTN. Had yeast cystitis, hospitalized recently, now resolved.      PAST MEDICAL HISTORY:  Past Medical History:   Diagnosis Date    Diabetes mellitus, type II, insulin dependent (H)     HTN (hypertension)     Major depression        MEDICATIONS:  Most accurate list exists at facility.   Current Outpatient Medications   Medication Sig Dispense Refill    acetaminophen (TYLENOL) 500 MG tablet Take 1,000 mg by mouth every 6 hours as needed for pain.      albuterol (PROAIR HFA/PROVENTIL HFA/VENTOLIN HFA) 108 (90 Base) MCG/ACT inhaler Inhale 2 puffs into the lungs every 4 hours as needed for shortness of breath, wheezing or cough 18 g 0    atorvastatin (LIPITOR) 40 MG tablet Take 40 mg by mouth daily.      empagliflozin (JARDIANCE) 10 MG TABS tablet Take 10 mg by mouth daily.      fluconazole (DIFLUCAN) 200 MG tablet Take 200 mg by mouth daily.      gabapentin (NEURONTIN) 250 MG/5ML solution Take 2 mLs (100 mg) by mouth 3 times daily.      Lidocaine (LIDOCARE) 4 % Patch Place 1 patch onto the skin every 24 hours. To prevent lidocaine toxicity, patient should be patch free for 12 hrs daily.      melatonin 3 MG tablet Take 3 mg by mouth at bedtime.      mirtazapine (REMERON) 30 MG tablet Take 30 mg by mouth at bedtime.      nicotine (NICODERM CQ) 7 MG/24HR 24 hr patch Place 1 patch onto the skin every 24 hours.      ondansetron (ZOFRAN) 4 MG tablet Take by mouth every 8 hours as needed for nausea.      oxyCODONE (ROXICODONE) 5 MG tablet Take 5 mg by mouth daily as needed for severe pain.      polyethylene glycol (MIRALAX) 17 g packet Take 1 packet by mouth daily as needed for constipation.      senna-docusate (SENOKOT-S/PERICOLACE) 8.6-50 MG tablet Take 2 tablets by  "mouth 2 times daily as needed for constipation.      tiotropium-olodaterol 2.5-2.5 MCG/ACT AERS Inhale 2 puffs into the lungs daily.      tiZANidine (ZANAFLEX) 2 MG tablet Take 2 mg by mouth every 8 hours.      traZODone (DESYREL) 50 MG tablet Take 25 mg by mouth at bedtime.          PHYSICAL EXAM:  General: Patient is alert female, no distress.    Vitals: /67   Pulse 71   Temp 98.4  F (36.9  C)   Resp 18   Ht 1.702 m (5' 7\")   Wt 84.4 kg (186 lb 1.6 oz)   SpO2 96%   BMI 29.15 kg/m    HEENT: Eyes show no injection or icterus. Nares negative. Oropharynx well hydrated.  Neck: No JVD.  Lungs: Non labored respirations.   Extremities: No edema.  Musculoskeletal: Degen changes. Right hemiparesis.   Skin: Warm and dry.   Psych: Mood is stable.      LABS/DIAGNOSTIC DATA:  Component      Latest Ref Rn 7/19/2024  11:19 AM 7/31/2024  5:30 AM   WBC      4.0 - 11.0 10e3/uL 10.4  11.4 (H)    RBC Count      3.80 - 5.20 10e6/uL 4.13  4.51    Hemoglobin      11.7 - 15.7 g/dL 10.9 (L)  11.6 (L)    Hematocrit      35.0 - 47.0 % 36.7  39.7    MCV      78 - 100 fL 89  88    MCH      26.5 - 33.0 pg 26.4 (L)  25.7 (L)    MCHC      31.5 - 36.5 g/dL 29.7 (L)  29.2 (L)    RDW      10.0 - 15.0 % 16.0 (H)  17.1 (H)    Platelet Count      150 - 450 10e3/uL 340  333       Component      Latest Ref Rn 7/24/2024  4:12 AM 7/31/2024  5:30 AM   Sodium      135 - 145 mmol/L 138  137    Potassium      3.4 - 5.3 mmol/L 4.5  4.5    Carbon Dioxide (CO2)      22 - 29 mmol/L 25  24    Anion Gap      7 - 15 mmol/L 13  13    Urea Nitrogen      8.0 - 23.0 mg/dL 27.9 (H)  31.4 (H)    Creatinine      0.51 - 0.95 mg/dL 1.11 (H)  1.14 (H)    GFR Estimate      >60 mL/min/1.73m2 54 (L)  53 (L)    Calcium      8.8 - 10.4 mg/dL 9.5  9.7    Chloride      98 - 107 mmol/L 100  100         ASSESSMENT/PLAN:  Stroke. Left MCA distribution. S/p decompressive hemicraniectomy due to cerebral edema, and hemorrhagic transformation. Resultant right hemiplegia, " dysphagia. Had cranioplasty Jan 2025, no complications. Follow up with neurosurgery. Continue statin.   Afib. Not on anticoagulation given brain bleeding.   HTN. Not on BP meds, continue to monitor.   CHF. Reduced EF. Not on diuretics. Follow up with cardiology.  DM. On dapagloflozin. Accuchecks followed.   COPD. Respiratory status is stable.   CKD.       Electronically signed by: Shari Lerma MD

## 2025-07-01 NOTE — PROGRESS NOTES
[facility] Care Coordination Contact    [facility] discharge request for Roya, had met with Roya to discuss this previously and at that time ended request due to her daughter wanting her to move to new SNF.  That did not end up happening so  and her daughter Miles would like to have relocation services to look for group home setting.  Talked to Miles on this date, she said she would talk to her mom about when a joint visit could be done.  She will get back to .    CHHAYA Cox, [facility]  [phone]         no

## 2025-07-15 ENCOUNTER — TELEPHONE (OUTPATIENT)
Dept: GERIATRICS | Facility: CLINIC | Age: 70
End: 2025-07-15
Payer: COMMERCIAL

## 2025-07-15 NOTE — TELEPHONE ENCOUNTER
[facility]   July 15, 2025     Name: Roya Burgos   : 1955     Background:  Wet cough, stable vitals    Orders:  NNO. Agree with EDWAR    Electronically signed by CINDY Hodges CNP

## 2025-07-15 NOTE — TELEPHONE ENCOUNTER
"[facility] Triage Call    Provider: CINDY Stacy CNP  Facility: [facility]  Facility Type:  LTC    Caller: Anna  Call Back Number: [phone]    Allergies:  No Known Allergies       SBAR:     S-(situation): The nurse is calling to report an occasional wet cough.     B-(background): History of COPD    A-(assessment): /68, P67, T90.6, O2 sat 100% on room air, lungs clear, occasional wet cough noted, no shortness of breath.  Patient reports coughing up some phlegm.     R-(recommendations): Advised nursing to use standing house cough syrup at this time and to monitor.  Please advise of any new orders      Telephone encounter sent to:  CINDY Stacy CNP    Please send response/orders to \"Geriatrics Nurse Pool\"    Zeina Wolfe RN      "

## 2025-08-12 ENCOUNTER — NURSING HOME VISIT (OUTPATIENT)
Dept: GERIATRICS | Facility: CLINIC | Age: 70
End: 2025-08-12
Payer: COMMERCIAL

## 2025-08-12 VITALS
SYSTOLIC BLOOD PRESSURE: 110 MMHG | RESPIRATION RATE: 18 BRPM | TEMPERATURE: 97.9 F | HEART RATE: 69 BPM | WEIGHT: 190.4 LBS | DIASTOLIC BLOOD PRESSURE: 89 MMHG | BODY MASS INDEX: 29.88 KG/M2 | HEIGHT: 67 IN | OXYGEN SATURATION: 97 %

## 2025-08-12 DIAGNOSIS — N18.31 STAGE 3A CHRONIC KIDNEY DISEASE (H): ICD-10-CM

## 2025-08-12 DIAGNOSIS — Z53.09 CONTRAINDICATION TO ANTICOAGULATION THERAPY: Primary | ICD-10-CM

## 2025-08-12 DIAGNOSIS — E04.1 THYROID NODULE: ICD-10-CM

## 2025-08-12 DIAGNOSIS — M79.621 PAIN OF RIGHT UPPER ARM: ICD-10-CM

## 2025-08-12 DIAGNOSIS — Z98.890 HISTORY OF CRANIOPLASTY: ICD-10-CM

## 2025-08-12 DIAGNOSIS — I69.398 SPASTICITY AS LATE EFFECT OF CEREBROVASCULAR ACCIDENT (CVA): ICD-10-CM

## 2025-08-12 DIAGNOSIS — Z86.73 HISTORY OF CVA (CEREBROVASCULAR ACCIDENT): ICD-10-CM

## 2025-08-12 DIAGNOSIS — R25.2 SPASTICITY AS LATE EFFECT OF CEREBROVASCULAR ACCIDENT (CVA): ICD-10-CM

## 2025-08-12 DIAGNOSIS — I69.351 FLACCID HEMIPLEGIA OF RIGHT DOMINANT SIDE AS LATE EFFECT OF CEREBRAL INFARCTION (H): ICD-10-CM

## 2025-08-12 DIAGNOSIS — K59.01 SLOW TRANSIT CONSTIPATION: ICD-10-CM

## 2025-08-12 DIAGNOSIS — F51.01 PRIMARY INSOMNIA: ICD-10-CM

## 2025-08-12 DIAGNOSIS — B37.41 YEAST CYSTITIS: ICD-10-CM

## 2025-08-12 PROCEDURE — 99310 SBSQ NF CARE HIGH MDM 45: CPT

## 2025-08-20 ENCOUNTER — PATIENT OUTREACH (OUTPATIENT)
Dept: GERIATRIC MEDICINE | Facility: CLINIC | Age: 70
End: 2025-08-20
Payer: COMMERCIAL

## 2025-08-23 DIAGNOSIS — R52 PAIN: Primary | ICD-10-CM

## 2025-08-23 RX ORDER — OXYCODONE HYDROCHLORIDE 5 MG/1
5 TABLET ORAL DAILY PRN
Qty: 5 TABLET | Refills: 0 | Status: SHIPPED | OUTPATIENT
Start: 2025-08-23 | End: 2025-08-26

## 2025-08-26 DIAGNOSIS — R52 PAIN: ICD-10-CM

## 2025-08-26 RX ORDER — OXYCODONE HYDROCHLORIDE 5 MG/1
5 TABLET ORAL DAILY PRN
Qty: 30 TABLET | Refills: 0 | Status: SHIPPED | OUTPATIENT
Start: 2025-08-26

## 2025-09-04 ENCOUNTER — NURSING HOME VISIT (OUTPATIENT)
Dept: GERIATRICS | Facility: CLINIC | Age: 70
End: 2025-09-04
Payer: COMMERCIAL

## 2025-09-04 ENCOUNTER — TELEPHONE (OUTPATIENT)
Dept: GERIATRICS | Facility: CLINIC | Age: 70
End: 2025-09-04

## 2025-09-04 VITALS
HEIGHT: 67 IN | WEIGHT: 189.6 LBS | RESPIRATION RATE: 18 BRPM | OXYGEN SATURATION: 98 % | DIASTOLIC BLOOD PRESSURE: 71 MMHG | SYSTOLIC BLOOD PRESSURE: 157 MMHG | BODY MASS INDEX: 29.76 KG/M2 | TEMPERATURE: 97.2 F | HEART RATE: 86 BPM

## 2025-09-04 DIAGNOSIS — Z79.4 TYPE 2 DIABETES MELLITUS WITH STAGE 3 CHRONIC KIDNEY DISEASE, WITH LONG-TERM CURRENT USE OF INSULIN, UNSPECIFIED WHETHER STAGE 3A OR 3B CKD (H): Primary | ICD-10-CM

## 2025-09-04 DIAGNOSIS — N18.30 TYPE 2 DIABETES MELLITUS WITH STAGE 3 CHRONIC KIDNEY DISEASE, WITH LONG-TERM CURRENT USE OF INSULIN, UNSPECIFIED WHETHER STAGE 3A OR 3B CKD (H): Primary | ICD-10-CM

## 2025-09-04 DIAGNOSIS — E11.22 TYPE 2 DIABETES MELLITUS WITH STAGE 3 CHRONIC KIDNEY DISEASE, WITH LONG-TERM CURRENT USE OF INSULIN, UNSPECIFIED WHETHER STAGE 3A OR 3B CKD (H): Primary | ICD-10-CM

## 2025-09-04 RX ORDER — DAPAGLIFLOZIN 10 MG/1
10 TABLET, FILM COATED ORAL DAILY
Status: SHIPPED
Start: 2025-09-04

## 2025-09-04 RX ORDER — DAPAGLIFLOZIN 10 MG/1
10 TABLET, FILM COATED ORAL DAILY
COMMUNITY
Start: 2025-06-02 | End: 2025-09-04